# Patient Record
Sex: FEMALE | Race: OTHER | NOT HISPANIC OR LATINO | Employment: PART TIME | URBAN - METROPOLITAN AREA
[De-identification: names, ages, dates, MRNs, and addresses within clinical notes are randomized per-mention and may not be internally consistent; named-entity substitution may affect disease eponyms.]

---

## 2017-01-27 ENCOUNTER — HOSPITAL ENCOUNTER (EMERGENCY)
Facility: HOSPITAL | Age: 22
Discharge: HOME | End: 2017-01-27
Attending: EMERGENCY MEDICINE | Admitting: EMERGENCY MEDICINE

## 2017-01-27 VITALS
RESPIRATION RATE: 16 BRPM | HEART RATE: 88 BPM | SYSTOLIC BLOOD PRESSURE: 116 MMHG | BODY MASS INDEX: 22.68 KG/M2 | DIASTOLIC BLOOD PRESSURE: 56 MMHG | WEIGHT: 124 LBS | OXYGEN SATURATION: 99 % | TEMPERATURE: 97.6 F

## 2017-01-27 DIAGNOSIS — B34.9 VIRAL SYNDROME: Primary | ICD-10-CM

## 2017-01-27 PROCEDURE — 96372 THER/PROPH/DIAG INJ SC/IM: CPT

## 2017-01-27 PROCEDURE — 99283 EMERGENCY DEPT VISIT LOW MDM: CPT

## 2017-01-27 RX ORDER — KETOROLAC TROMETHAMINE 30 MG/ML
15 INJECTION, SOLUTION INTRAMUSCULAR; INTRAVENOUS ONCE
Status: COMPLETED | OUTPATIENT
Start: 2017-01-27 | End: 2017-01-27

## 2017-01-27 RX ORDER — OXYMETAZOLINE HYDROCHLORIDE 0.05 G/100ML
2 SPRAY NASAL EVERY 12 HOURS PRN
Qty: 12 ML | Refills: 0 | Status: SHIPPED | OUTPATIENT
Start: 2017-01-27 | End: 2017-09-06

## 2017-01-27 RX ADMIN — KETOROLAC TROMETHAMINE 15 MG: 30 INJECTION, SOLUTION INTRAMUSCULAR at 16:20

## 2017-09-06 ENCOUNTER — HOSPITAL ENCOUNTER (EMERGENCY)
Facility: HOSPITAL | Age: 22
End: 2017-09-06
Attending: EMERGENCY MEDICINE
Payer: COMMERCIAL

## 2017-09-06 ENCOUNTER — HOSPITAL ENCOUNTER (INPATIENT)
Facility: HOSPITAL | Age: 22
LOS: 8 days | Discharge: HOME | DRG: 885 | End: 2017-09-14
Attending: PSYCHIATRY & NEUROLOGY | Admitting: PSYCHIATRY & NEUROLOGY
Payer: COMMERCIAL

## 2017-09-06 VITALS
BODY MASS INDEX: 21.95 KG/M2 | DIASTOLIC BLOOD PRESSURE: 70 MMHG | TEMPERATURE: 98.6 F | SYSTOLIC BLOOD PRESSURE: 110 MMHG | WEIGHT: 120 LBS | RESPIRATION RATE: 16 BRPM | OXYGEN SATURATION: 100 % | HEART RATE: 62 BPM

## 2017-09-06 DIAGNOSIS — F33.3 SEVERE EPISODE OF RECURRENT MAJOR DEPRESSIVE DISORDER, WITH PSYCHOTIC FEATURES (HCC): Primary | ICD-10-CM

## 2017-09-06 DIAGNOSIS — T50.902A INTENTIONAL DRUG OVERDOSE (HCC): Primary | ICD-10-CM

## 2017-09-06 DIAGNOSIS — G47.00 INSOMNIA: ICD-10-CM

## 2017-09-06 DIAGNOSIS — F41.1 GENERALIZED ANXIETY DISORDER: ICD-10-CM

## 2017-09-06 DIAGNOSIS — F32.A DEPRESSION: ICD-10-CM

## 2017-09-06 LAB
ALBUMIN SERPL BCP-MCNC: 4.2 G/DL (ref 3.5–5)
ALP SERPL-CCNC: 76 U/L (ref 46–116)
ALT SERPL W P-5'-P-CCNC: 24 U/L (ref 12–78)
AMPHETAMINES SERPL QL SCN: NEGATIVE
ANION GAP SERPL CALCULATED.3IONS-SCNC: 9 MMOL/L (ref 4–13)
APAP SERPL-MCNC: 28 UG/ML (ref 10–30)
APAP SERPL-MCNC: 31 UG/ML (ref 10–30)
APAP SERPL-MCNC: 33 UG/ML (ref 10–30)
AST SERPL W P-5'-P-CCNC: 27 U/L (ref 5–45)
ATRIAL RATE: 93 BPM
BACTERIA UR QL AUTO: ABNORMAL /HPF
BARBITURATES UR QL: NEGATIVE
BASOPHILS # BLD AUTO: 0.03 THOUSANDS/ΜL (ref 0–0.1)
BASOPHILS NFR BLD AUTO: 0 % (ref 0–1)
BENZODIAZ UR QL: NEGATIVE
BILIRUB SERPL-MCNC: 0.4 MG/DL (ref 0.2–1)
BILIRUB UR QL STRIP: NEGATIVE
BUN SERPL-MCNC: 7 MG/DL (ref 5–25)
CALCIUM SERPL-MCNC: 8.9 MG/DL (ref 8.3–10.1)
CHLORIDE SERPL-SCNC: 105 MMOL/L (ref 100–108)
CLARITY UR: CLEAR
CO2 SERPL-SCNC: 27 MMOL/L (ref 21–32)
COCAINE UR QL: NEGATIVE
COLOR UR: YELLOW
COLOR, POC: YELLOW
CREAT SERPL-MCNC: 0.9 MG/DL (ref 0.6–1.3)
EOSINOPHIL # BLD AUTO: 0.13 THOUSAND/ΜL (ref 0–0.61)
EOSINOPHIL NFR BLD AUTO: 2 % (ref 0–6)
ERYTHROCYTE [DISTWIDTH] IN BLOOD BY AUTOMATED COUNT: 13.7 % (ref 11.6–15.1)
ETHANOL SERPL-MCNC: 112 MG/DL (ref 0–3)
GFR SERPL CREATININE-BSD FRML MDRD: 91 ML/MIN/1.73SQ M
GLUCOSE SERPL-MCNC: 104 MG/DL (ref 65–140)
GLUCOSE UR STRIP-MCNC: NEGATIVE MG/DL
HCG UR QL: NEGATIVE
HCT VFR BLD AUTO: 43 % (ref 34.8–46.1)
HGB BLD-MCNC: 14.5 G/DL (ref 11.5–15.4)
HGB UR QL STRIP.AUTO: NEGATIVE
HYALINE CASTS #/AREA URNS LPF: ABNORMAL /LPF
KETONES UR STRIP-MCNC: NEGATIVE MG/DL
LEUKOCYTE ESTERASE UR QL STRIP: NEGATIVE
LYMPHOCYTES # BLD AUTO: 2.85 THOUSANDS/ΜL (ref 0.6–4.47)
LYMPHOCYTES NFR BLD AUTO: 34 % (ref 14–44)
MCH RBC QN AUTO: 31.5 PG (ref 26.8–34.3)
MCHC RBC AUTO-ENTMCNC: 33.7 G/DL (ref 31.4–37.4)
MCV RBC AUTO: 94 FL (ref 82–98)
METHADONE UR QL: NEGATIVE
MONOCYTES # BLD AUTO: 1.03 THOUSAND/ΜL (ref 0.17–1.22)
MONOCYTES NFR BLD AUTO: 12 % (ref 4–12)
NEUTROPHILS # BLD AUTO: 4.44 THOUSANDS/ΜL (ref 1.85–7.62)
NEUTS SEG NFR BLD AUTO: 52 % (ref 43–75)
NITRITE UR QL STRIP: NEGATIVE
NON-SQ EPI CELLS URNS QL MICRO: ABNORMAL /HPF
NRBC BLD AUTO-RTO: 0 /100 WBCS
OPIATES UR QL SCN: NEGATIVE
P AXIS: 70 DEGREES
PCP UR QL: NEGATIVE
PH UR STRIP.AUTO: 6.5 [PH] (ref 4.5–8)
PLATELET # BLD AUTO: 283 THOUSANDS/UL (ref 149–390)
PMV BLD AUTO: 10.3 FL (ref 8.9–12.7)
POTASSIUM SERPL-SCNC: 3.7 MMOL/L (ref 3.5–5.3)
PR INTERVAL: 128 MS
PROT SERPL-MCNC: 8.1 G/DL (ref 6.4–8.2)
PROT UR STRIP-MCNC: ABNORMAL MG/DL
QRS AXIS: 86 DEGREES
QRSD INTERVAL: 76 MS
QT INTERVAL: 318 MS
QTC INTERVAL: 395 MS
RBC # BLD AUTO: 4.6 MILLION/UL (ref 3.81–5.12)
RBC #/AREA URNS AUTO: ABNORMAL /HPF
SALICYLATES SERPL-MCNC: 4 MG/DL (ref 3–20)
SODIUM SERPL-SCNC: 141 MMOL/L (ref 136–145)
SP GR UR STRIP.AUTO: 1.02 (ref 1–1.03)
T WAVE AXIS: 61 DEGREES
THC UR QL: POSITIVE
UROBILINOGEN UR QL STRIP.AUTO: 0.2 E.U./DL
VENTRICULAR RATE: 93 BPM
WBC # BLD AUTO: 8.5 THOUSAND/UL (ref 4.31–10.16)
WBC #/AREA URNS AUTO: ABNORMAL /HPF

## 2017-09-06 PROCEDURE — 96374 THER/PROPH/DIAG INJ IV PUSH: CPT

## 2017-09-06 PROCEDURE — 81001 URINALYSIS AUTO W/SCOPE: CPT

## 2017-09-06 PROCEDURE — 81002 URINALYSIS NONAUTO W/O SCOPE: CPT | Performed by: EMERGENCY MEDICINE

## 2017-09-06 PROCEDURE — 80053 COMPREHEN METABOLIC PANEL: CPT | Performed by: EMERGENCY MEDICINE

## 2017-09-06 PROCEDURE — 80307 DRUG TEST PRSMV CHEM ANLYZR: CPT | Performed by: EMERGENCY MEDICINE

## 2017-09-06 PROCEDURE — 36415 COLL VENOUS BLD VENIPUNCTURE: CPT | Performed by: EMERGENCY MEDICINE

## 2017-09-06 PROCEDURE — 96361 HYDRATE IV INFUSION ADD-ON: CPT

## 2017-09-06 PROCEDURE — 81025 URINE PREGNANCY TEST: CPT | Performed by: EMERGENCY MEDICINE

## 2017-09-06 PROCEDURE — 80320 DRUG SCREEN QUANTALCOHOLS: CPT | Performed by: EMERGENCY MEDICINE

## 2017-09-06 PROCEDURE — 93005 ELECTROCARDIOGRAM TRACING: CPT

## 2017-09-06 PROCEDURE — 80329 ANALGESICS NON-OPIOID 1 OR 2: CPT | Performed by: EMERGENCY MEDICINE

## 2017-09-06 PROCEDURE — 85025 COMPLETE CBC W/AUTO DIFF WBC: CPT | Performed by: EMERGENCY MEDICINE

## 2017-09-06 PROCEDURE — 99285 EMERGENCY DEPT VISIT HI MDM: CPT

## 2017-09-06 RX ORDER — BENZTROPINE MESYLATE 1 MG/ML
1 INJECTION INTRAMUSCULAR; INTRAVENOUS EVERY 6 HOURS PRN
Status: CANCELLED | OUTPATIENT
Start: 2017-09-06

## 2017-09-06 RX ORDER — LORAZEPAM 0.5 MG/1
1 TABLET ORAL ONCE
Status: COMPLETED | OUTPATIENT
Start: 2017-09-06 | End: 2017-09-06

## 2017-09-06 RX ORDER — OLANZAPINE 10 MG/1
10 TABLET ORAL
Status: CANCELLED | OUTPATIENT
Start: 2017-09-06

## 2017-09-06 RX ORDER — MAGNESIUM HYDROXIDE/ALUMINUM HYDROXICE/SIMETHICONE 120; 1200; 1200 MG/30ML; MG/30ML; MG/30ML
30 SUSPENSION ORAL EVERY 4 HOURS PRN
Status: DISCONTINUED | OUTPATIENT
Start: 2017-09-06 | End: 2017-09-14 | Stop reason: HOSPADM

## 2017-09-06 RX ORDER — BENZTROPINE MESYLATE 1 MG/1
1 TABLET ORAL EVERY 6 HOURS PRN
Status: CANCELLED | OUTPATIENT
Start: 2017-09-06

## 2017-09-06 RX ORDER — HALOPERIDOL 5 MG
5 TABLET ORAL EVERY 6 HOURS PRN
Status: DISCONTINUED | OUTPATIENT
Start: 2017-09-06 | End: 2017-09-14 | Stop reason: HOSPADM

## 2017-09-06 RX ORDER — TRAZODONE HYDROCHLORIDE 50 MG/1
50 TABLET ORAL
Status: DISCONTINUED | OUTPATIENT
Start: 2017-09-06 | End: 2017-09-14 | Stop reason: HOSPADM

## 2017-09-06 RX ORDER — ACETAMINOPHEN 325 MG/1
650 TABLET ORAL EVERY 6 HOURS PRN
Status: DISCONTINUED | OUTPATIENT
Start: 2017-09-06 | End: 2017-09-14 | Stop reason: HOSPADM

## 2017-09-06 RX ORDER — BENZTROPINE MESYLATE 1 MG/ML
1 INJECTION INTRAMUSCULAR; INTRAVENOUS EVERY 6 HOURS PRN
Status: DISCONTINUED | OUTPATIENT
Start: 2017-09-06 | End: 2017-09-14 | Stop reason: HOSPADM

## 2017-09-06 RX ORDER — RISPERIDONE 1 MG/1
1 TABLET, ORALLY DISINTEGRATING ORAL
Status: DISCONTINUED | OUTPATIENT
Start: 2017-09-06 | End: 2017-09-14 | Stop reason: HOSPADM

## 2017-09-06 RX ORDER — LORAZEPAM 2 MG/ML
2 INJECTION INTRAMUSCULAR EVERY 6 HOURS PRN
Status: DISCONTINUED | OUTPATIENT
Start: 2017-09-06 | End: 2017-09-14 | Stop reason: HOSPADM

## 2017-09-06 RX ORDER — LORAZEPAM 1 MG/1
1 TABLET ORAL EVERY 6 HOURS PRN
Status: DISCONTINUED | OUTPATIENT
Start: 2017-09-06 | End: 2017-09-11

## 2017-09-06 RX ORDER — OLANZAPINE 10 MG/1
10 INJECTION, POWDER, LYOPHILIZED, FOR SOLUTION INTRAMUSCULAR
Status: DISCONTINUED | OUTPATIENT
Start: 2017-09-06 | End: 2017-09-14 | Stop reason: HOSPADM

## 2017-09-06 RX ORDER — HALOPERIDOL 5 MG
5 TABLET ORAL EVERY 6 HOURS PRN
Status: CANCELLED | OUTPATIENT
Start: 2017-09-06

## 2017-09-06 RX ORDER — OLANZAPINE 10 MG/1
10 INJECTION, POWDER, LYOPHILIZED, FOR SOLUTION INTRAMUSCULAR
Status: CANCELLED | OUTPATIENT
Start: 2017-09-06

## 2017-09-06 RX ORDER — OLANZAPINE 10 MG/1
10 TABLET ORAL
Status: DISCONTINUED | OUTPATIENT
Start: 2017-09-06 | End: 2017-09-14 | Stop reason: HOSPADM

## 2017-09-06 RX ORDER — ONDANSETRON 2 MG/ML
4 INJECTION INTRAMUSCULAR; INTRAVENOUS ONCE
Status: COMPLETED | OUTPATIENT
Start: 2017-09-06 | End: 2017-09-06

## 2017-09-06 RX ORDER — TRAZODONE HYDROCHLORIDE 50 MG/1
50 TABLET ORAL
Status: CANCELLED | OUTPATIENT
Start: 2017-09-06

## 2017-09-06 RX ORDER — BENZTROPINE MESYLATE 1 MG/1
1 TABLET ORAL EVERY 6 HOURS PRN
Status: DISCONTINUED | OUTPATIENT
Start: 2017-09-06 | End: 2017-09-14 | Stop reason: HOSPADM

## 2017-09-06 RX ORDER — LORAZEPAM 2 MG/ML
2 INJECTION INTRAMUSCULAR EVERY 6 HOURS PRN
Status: CANCELLED | OUTPATIENT
Start: 2017-09-06

## 2017-09-06 RX ORDER — MAGNESIUM HYDROXIDE/ALUMINUM HYDROXICE/SIMETHICONE 120; 1200; 1200 MG/30ML; MG/30ML; MG/30ML
30 SUSPENSION ORAL EVERY 4 HOURS PRN
Status: CANCELLED | OUTPATIENT
Start: 2017-09-06

## 2017-09-06 RX ORDER — LORAZEPAM 0.5 MG/1
1 TABLET ORAL EVERY 6 HOURS PRN
Status: CANCELLED | OUTPATIENT
Start: 2017-09-06

## 2017-09-06 RX ORDER — HALOPERIDOL 5 MG/ML
5 INJECTION INTRAMUSCULAR EVERY 6 HOURS PRN
Status: CANCELLED | OUTPATIENT
Start: 2017-09-06

## 2017-09-06 RX ORDER — HALOPERIDOL 5 MG/ML
5 INJECTION INTRAMUSCULAR EVERY 6 HOURS PRN
Status: DISCONTINUED | OUTPATIENT
Start: 2017-09-06 | End: 2017-09-14 | Stop reason: HOSPADM

## 2017-09-06 RX ORDER — RISPERIDONE 1 MG/1
1 TABLET, ORALLY DISINTEGRATING ORAL
Status: CANCELLED | OUTPATIENT
Start: 2017-09-06

## 2017-09-06 RX ORDER — ACETAMINOPHEN 325 MG/1
650 TABLET ORAL EVERY 6 HOURS PRN
Status: CANCELLED | OUTPATIENT
Start: 2017-09-06

## 2017-09-06 RX ADMIN — LORAZEPAM 1 MG: 0.5 TABLET ORAL at 12:56

## 2017-09-06 RX ADMIN — SODIUM CHLORIDE 1000 ML: 0.9 INJECTION, SOLUTION INTRAVENOUS at 06:00

## 2017-09-06 RX ADMIN — ONDANSETRON 4 MG: 2 INJECTION INTRAMUSCULAR; INTRAVENOUS at 06:02

## 2017-09-07 PROBLEM — IMO0002 ALCOHOL USE DISORDER: Status: ACTIVE | Noted: 2017-09-07

## 2017-09-07 PROBLEM — F41.1 GENERALIZED ANXIETY DISORDER: Status: ACTIVE | Noted: 2017-09-07

## 2017-09-07 PROBLEM — F33.2 SEVERE EPISODE OF RECURRENT MAJOR DEPRESSIVE DISORDER, WITHOUT PSYCHOTIC FEATURES (HCC): Status: ACTIVE | Noted: 2017-09-07

## 2017-09-07 LAB
APAP SERPL-MCNC: <2 UG/ML (ref 10–30)
BILIRUB UR QL STRIP: NEGATIVE
CHOLEST SERPL-MCNC: 110 MG/DL (ref 50–200)
CLARITY UR: CLEAR
COLOR UR: YELLOW
EST. AVERAGE GLUCOSE BLD GHB EST-MCNC: 108 MG/DL
GLUCOSE UR STRIP-MCNC: NEGATIVE MG/DL
HBA1C MFR BLD: 5.4 % (ref 4.2–6.3)
HCG SERPL QL: NEGATIVE
HDLC SERPL-MCNC: 67 MG/DL (ref 40–60)
HGB UR QL STRIP.AUTO: NEGATIVE
KETONES UR STRIP-MCNC: ABNORMAL MG/DL
LDLC SERPL CALC-MCNC: 32 MG/DL (ref 0–100)
LEUKOCYTE ESTERASE UR QL STRIP: NEGATIVE
NITRITE UR QL STRIP: NEGATIVE
PH UR STRIP.AUTO: 6 [PH] (ref 4.5–8)
PROT UR STRIP-MCNC: NEGATIVE MG/DL
RPR SER QL: NORMAL
SP GR UR STRIP.AUTO: 1.01 (ref 1–1.03)
TRIGL SERPL-MCNC: 53 MG/DL
TSH SERPL DL<=0.05 MIU/L-ACNC: 0.66 UIU/ML (ref 0.36–3.74)
UROBILINOGEN UR QL STRIP.AUTO: 0.2 E.U./DL

## 2017-09-07 PROCEDURE — 84443 ASSAY THYROID STIM HORMONE: CPT | Performed by: PHYSICIAN ASSISTANT

## 2017-09-07 PROCEDURE — 80329 ANALGESICS NON-OPIOID 1 OR 2: CPT | Performed by: PHYSICIAN ASSISTANT

## 2017-09-07 PROCEDURE — 83036 HEMOGLOBIN GLYCOSYLATED A1C: CPT | Performed by: PHYSICIAN ASSISTANT

## 2017-09-07 PROCEDURE — 84703 CHORIONIC GONADOTROPIN ASSAY: CPT | Performed by: PHYSICIAN ASSISTANT

## 2017-09-07 PROCEDURE — 81003 URINALYSIS AUTO W/O SCOPE: CPT | Performed by: PHYSICIAN ASSISTANT

## 2017-09-07 PROCEDURE — 80061 LIPID PANEL: CPT | Performed by: PHYSICIAN ASSISTANT

## 2017-09-07 PROCEDURE — 86592 SYPHILIS TEST NON-TREP QUAL: CPT | Performed by: PHYSICIAN ASSISTANT

## 2017-09-07 RX ORDER — PAROXETINE 10 MG/1
10 TABLET, FILM COATED ORAL DAILY
Status: DISCONTINUED | OUTPATIENT
Start: 2017-09-07 | End: 2017-09-08

## 2017-09-07 RX ORDER — FOLIC ACID 1 MG/1
1 TABLET ORAL DAILY
Status: DISCONTINUED | OUTPATIENT
Start: 2017-09-07 | End: 2017-09-14 | Stop reason: HOSPADM

## 2017-09-07 RX ORDER — THIAMINE MONONITRATE (VIT B1) 100 MG
100 TABLET ORAL DAILY
Status: DISCONTINUED | OUTPATIENT
Start: 2017-09-07 | End: 2017-09-14 | Stop reason: HOSPADM

## 2017-09-07 RX ADMIN — TRAZODONE HYDROCHLORIDE 50 MG: 50 TABLET ORAL at 21:36

## 2017-09-07 RX ADMIN — PAROXETINE HYDROCHLORIDE 10 MG: 10 TABLET, FILM COATED ORAL at 10:27

## 2017-09-07 RX ADMIN — LORAZEPAM 1 MG: 1 TABLET ORAL at 16:57

## 2017-09-07 RX ADMIN — Medication 100 MG: at 13:14

## 2017-09-07 RX ADMIN — FOLIC ACID 1 MG: 1 TABLET ORAL at 13:14

## 2017-09-07 RX ADMIN — TRAZODONE HYDROCHLORIDE 50 MG: 50 TABLET ORAL at 00:33

## 2017-09-08 RX ORDER — PAROXETINE HYDROCHLORIDE 20 MG/1
20 TABLET, FILM COATED ORAL DAILY
Status: DISCONTINUED | OUTPATIENT
Start: 2017-09-09 | End: 2017-09-09

## 2017-09-08 RX ORDER — PAROXETINE 10 MG/1
10 TABLET, FILM COATED ORAL ONCE
Status: COMPLETED | OUTPATIENT
Start: 2017-09-08 | End: 2017-09-08

## 2017-09-08 RX ORDER — NICOTINE 21 MG/24HR
1 PATCH, TRANSDERMAL 24 HOURS TRANSDERMAL DAILY
Status: DISCONTINUED | OUTPATIENT
Start: 2017-09-08 | End: 2017-09-14 | Stop reason: HOSPADM

## 2017-09-08 RX ORDER — HYDROXYZINE HYDROCHLORIDE 25 MG/1
25 TABLET, FILM COATED ORAL EVERY 6 HOURS PRN
Status: DISCONTINUED | OUTPATIENT
Start: 2017-09-08 | End: 2017-09-10

## 2017-09-08 RX ADMIN — NICOTINE POLACRILEX 2 MG: 2 GUM, CHEWING BUCCAL at 16:44

## 2017-09-08 RX ADMIN — LORAZEPAM 1 MG: 1 TABLET ORAL at 16:44

## 2017-09-08 RX ADMIN — PAROXETINE HYDROCHLORIDE 10 MG: 10 TABLET, FILM COATED ORAL at 09:04

## 2017-09-08 RX ADMIN — PAROXETINE HYDROCHLORIDE 10 MG: 10 TABLET, FILM COATED ORAL at 10:39

## 2017-09-08 RX ADMIN — TRAZODONE HYDROCHLORIDE 50 MG: 50 TABLET ORAL at 21:15

## 2017-09-08 RX ADMIN — FOLIC ACID 1 MG: 1 TABLET ORAL at 09:04

## 2017-09-08 RX ADMIN — Medication 100 MG: at 09:04

## 2017-09-08 RX ADMIN — HYDROXYZINE HYDROCHLORIDE 25 MG: 25 TABLET, FILM COATED ORAL at 13:10

## 2017-09-08 RX ADMIN — HYDROXYZINE HYDROCHLORIDE 25 MG: 25 TABLET, FILM COATED ORAL at 21:15

## 2017-09-08 RX ADMIN — NICOTINE 1 PATCH: 14 PATCH TRANSDERMAL at 12:26

## 2017-09-09 RX ORDER — GINSENG 100 MG
1 CAPSULE ORAL 2 TIMES DAILY
Status: DISCONTINUED | OUTPATIENT
Start: 2017-09-09 | End: 2017-09-14 | Stop reason: HOSPADM

## 2017-09-09 RX ORDER — GABAPENTIN 100 MG/1
100 CAPSULE ORAL 3 TIMES DAILY
Status: DISCONTINUED | OUTPATIENT
Start: 2017-09-09 | End: 2017-09-11

## 2017-09-09 RX ORDER — PAROXETINE 10 MG/1
10 TABLET, FILM COATED ORAL DAILY
Status: DISCONTINUED | OUTPATIENT
Start: 2017-09-10 | End: 2017-09-09

## 2017-09-09 RX ORDER — PAROXETINE HYDROCHLORIDE 20 MG/1
20 TABLET, FILM COATED ORAL DAILY
Status: DISCONTINUED | OUTPATIENT
Start: 2017-09-10 | End: 2017-09-11

## 2017-09-09 RX ADMIN — TRAZODONE HYDROCHLORIDE 50 MG: 50 TABLET ORAL at 21:38

## 2017-09-09 RX ADMIN — HYDROXYZINE HYDROCHLORIDE 25 MG: 25 TABLET, FILM COATED ORAL at 21:40

## 2017-09-09 RX ADMIN — HYDROXYZINE HYDROCHLORIDE 25 MG: 25 TABLET, FILM COATED ORAL at 14:10

## 2017-09-09 RX ADMIN — BACITRACIN ZINC 1 SMALL APPLICATION: 500 OINTMENT TOPICAL at 21:37

## 2017-09-09 RX ADMIN — GABAPENTIN 100 MG: 100 CAPSULE ORAL at 21:37

## 2017-09-09 RX ADMIN — LORAZEPAM 1 MG: 1 TABLET ORAL at 18:46

## 2017-09-09 RX ADMIN — FOLIC ACID 1 MG: 1 TABLET ORAL at 08:38

## 2017-09-09 RX ADMIN — PAROXETINE HYDROCHLORIDE 20 MG: 20 TABLET, FILM COATED ORAL at 08:39

## 2017-09-09 RX ADMIN — LORAZEPAM 1 MG: 1 TABLET ORAL at 08:40

## 2017-09-09 RX ADMIN — Medication 100 MG: at 08:38

## 2017-09-09 RX ADMIN — GABAPENTIN 100 MG: 100 CAPSULE ORAL at 14:10

## 2017-09-10 RX ORDER — HYDROXYZINE 50 MG/1
50 TABLET, FILM COATED ORAL EVERY 6 HOURS PRN
Status: DISCONTINUED | OUTPATIENT
Start: 2017-09-10 | End: 2017-09-14 | Stop reason: HOSPADM

## 2017-09-10 RX ADMIN — TRAZODONE HYDROCHLORIDE 50 MG: 50 TABLET ORAL at 21:52

## 2017-09-10 RX ADMIN — Medication 100 MG: at 08:48

## 2017-09-10 RX ADMIN — PAROXETINE HYDROCHLORIDE 20 MG: 20 TABLET, FILM COATED ORAL at 08:48

## 2017-09-10 RX ADMIN — LORAZEPAM 1 MG: 1 TABLET ORAL at 08:51

## 2017-09-10 RX ADMIN — LORAZEPAM 1 MG: 1 TABLET ORAL at 17:02

## 2017-09-10 RX ADMIN — GABAPENTIN 100 MG: 100 CAPSULE ORAL at 15:49

## 2017-09-10 RX ADMIN — GABAPENTIN 100 MG: 100 CAPSULE ORAL at 08:48

## 2017-09-10 RX ADMIN — NICOTINE POLACRILEX 2 MG: 2 GUM, CHEWING BUCCAL at 12:03

## 2017-09-10 RX ADMIN — GABAPENTIN 100 MG: 100 CAPSULE ORAL at 21:50

## 2017-09-10 RX ADMIN — HYDROXYZINE HYDROCHLORIDE 50 MG: 50 TABLET, FILM COATED ORAL at 12:02

## 2017-09-10 RX ADMIN — HYDROXYZINE HYDROCHLORIDE 50 MG: 50 TABLET, FILM COATED ORAL at 21:51

## 2017-09-10 RX ADMIN — BACITRACIN ZINC 1 SMALL APPLICATION: 500 OINTMENT TOPICAL at 17:54

## 2017-09-10 RX ADMIN — FOLIC ACID 1 MG: 1 TABLET ORAL at 08:48

## 2017-09-11 PROBLEM — F33.3 SEVERE EPISODE OF RECURRENT MAJOR DEPRESSIVE DISORDER, WITH PSYCHOTIC FEATURES (HCC): Status: ACTIVE | Noted: 2017-09-07

## 2017-09-11 RX ORDER — PAROXETINE 10 MG/1
10 TABLET, FILM COATED ORAL ONCE
Status: COMPLETED | OUTPATIENT
Start: 2017-09-11 | End: 2017-09-11

## 2017-09-11 RX ORDER — RISPERIDONE 0.5 MG/1
0.5 TABLET, ORALLY DISINTEGRATING ORAL 2 TIMES DAILY
Status: DISCONTINUED | OUTPATIENT
Start: 2017-09-11 | End: 2017-09-12

## 2017-09-11 RX ORDER — GABAPENTIN 300 MG/1
300 CAPSULE ORAL 3 TIMES DAILY
Status: DISCONTINUED | OUTPATIENT
Start: 2017-09-11 | End: 2017-09-14 | Stop reason: HOSPADM

## 2017-09-11 RX ADMIN — GABAPENTIN 300 MG: 300 CAPSULE ORAL at 16:52

## 2017-09-11 RX ADMIN — TRAZODONE HYDROCHLORIDE 50 MG: 50 TABLET ORAL at 21:24

## 2017-09-11 RX ADMIN — HYDROXYZINE HYDROCHLORIDE 50 MG: 50 TABLET, FILM COATED ORAL at 09:03

## 2017-09-11 RX ADMIN — GABAPENTIN 100 MG: 100 CAPSULE ORAL at 09:02

## 2017-09-11 RX ADMIN — Medication 100 MG: at 09:02

## 2017-09-11 RX ADMIN — PAROXETINE HYDROCHLORIDE 10 MG: 10 TABLET, FILM COATED ORAL at 12:18

## 2017-09-11 RX ADMIN — BACITRACIN ZINC 1 SMALL APPLICATION: 500 OINTMENT TOPICAL at 18:03

## 2017-09-11 RX ADMIN — RISPERIDONE 0.5 MG: 0.5 TABLET, ORALLY DISINTEGRATING ORAL at 17:36

## 2017-09-11 RX ADMIN — RISPERIDONE 0.5 MG: 0.5 TABLET, ORALLY DISINTEGRATING ORAL at 12:18

## 2017-09-11 RX ADMIN — HYDROXYZINE HYDROCHLORIDE 50 MG: 50 TABLET, FILM COATED ORAL at 16:54

## 2017-09-11 RX ADMIN — NICOTINE POLACRILEX 2 MG: 2 GUM, CHEWING BUCCAL at 09:05

## 2017-09-11 RX ADMIN — FOLIC ACID 1 MG: 1 TABLET ORAL at 09:02

## 2017-09-11 RX ADMIN — GABAPENTIN 300 MG: 300 CAPSULE ORAL at 21:23

## 2017-09-11 RX ADMIN — PAROXETINE HYDROCHLORIDE 20 MG: 20 TABLET, FILM COATED ORAL at 09:02

## 2017-09-11 RX ADMIN — BACITRACIN ZINC 1 SMALL APPLICATION: 500 OINTMENT TOPICAL at 09:06

## 2017-09-12 ENCOUNTER — GENERIC CONVERSION - ENCOUNTER (OUTPATIENT)
Dept: OTHER | Facility: OTHER | Age: 22
End: 2017-09-12

## 2017-09-12 RX ORDER — RISPERIDONE 0.5 MG/1
0.5 TABLET, ORALLY DISINTEGRATING ORAL ONCE
Status: COMPLETED | OUTPATIENT
Start: 2017-09-12 | End: 2017-09-12

## 2017-09-12 RX ORDER — RISPERIDONE 1 MG/1
1 TABLET, ORALLY DISINTEGRATING ORAL 2 TIMES DAILY
Status: DISCONTINUED | OUTPATIENT
Start: 2017-09-12 | End: 2017-09-14 | Stop reason: HOSPADM

## 2017-09-12 RX ADMIN — BACITRACIN ZINC 1 SMALL APPLICATION: 500 OINTMENT TOPICAL at 17:17

## 2017-09-12 RX ADMIN — RISPERIDONE 0.5 MG: 0.5 TABLET, ORALLY DISINTEGRATING ORAL at 11:07

## 2017-09-12 RX ADMIN — RISPERIDONE 1 MG: 1 TABLET, ORALLY DISINTEGRATING ORAL at 17:17

## 2017-09-12 RX ADMIN — RISPERIDONE 0.5 MG: 0.5 TABLET, ORALLY DISINTEGRATING ORAL at 09:02

## 2017-09-12 RX ADMIN — NICOTINE POLACRILEX 2 MG: 2 GUM, CHEWING BUCCAL at 17:16

## 2017-09-12 RX ADMIN — GABAPENTIN 300 MG: 300 CAPSULE ORAL at 16:07

## 2017-09-12 RX ADMIN — Medication 100 MG: at 09:01

## 2017-09-12 RX ADMIN — TRAZODONE HYDROCHLORIDE 50 MG: 50 TABLET ORAL at 21:31

## 2017-09-12 RX ADMIN — HYDROXYZINE HYDROCHLORIDE 50 MG: 50 TABLET, FILM COATED ORAL at 11:07

## 2017-09-12 RX ADMIN — HYDROXYZINE HYDROCHLORIDE 50 MG: 50 TABLET, FILM COATED ORAL at 17:18

## 2017-09-12 RX ADMIN — PAROXETINE HYDROCHLORIDE 30 MG: 20 TABLET, FILM COATED ORAL at 09:01

## 2017-09-12 RX ADMIN — GABAPENTIN 300 MG: 300 CAPSULE ORAL at 09:01

## 2017-09-12 RX ADMIN — GABAPENTIN 300 MG: 300 CAPSULE ORAL at 21:30

## 2017-09-12 RX ADMIN — FOLIC ACID 1 MG: 1 TABLET ORAL at 09:01

## 2017-09-13 RX ADMIN — BACITRACIN ZINC 1 SMALL APPLICATION: 500 OINTMENT TOPICAL at 17:24

## 2017-09-13 RX ADMIN — TRAZODONE HYDROCHLORIDE 50 MG: 50 TABLET ORAL at 21:17

## 2017-09-13 RX ADMIN — BACITRACIN ZINC 1 SMALL APPLICATION: 500 OINTMENT TOPICAL at 08:13

## 2017-09-13 RX ADMIN — GABAPENTIN 300 MG: 300 CAPSULE ORAL at 16:14

## 2017-09-13 RX ADMIN — Medication 100 MG: at 08:14

## 2017-09-13 RX ADMIN — GABAPENTIN 300 MG: 300 CAPSULE ORAL at 21:16

## 2017-09-13 RX ADMIN — HYDROXYZINE HYDROCHLORIDE 50 MG: 50 TABLET, FILM COATED ORAL at 12:22

## 2017-09-13 RX ADMIN — FOLIC ACID 1 MG: 1 TABLET ORAL at 08:14

## 2017-09-13 RX ADMIN — GABAPENTIN 300 MG: 300 CAPSULE ORAL at 08:14

## 2017-09-13 RX ADMIN — PAROXETINE HYDROCHLORIDE 30 MG: 20 TABLET, FILM COATED ORAL at 08:14

## 2017-09-13 RX ADMIN — NICOTINE POLACRILEX 2 MG: 2 GUM, CHEWING BUCCAL at 08:14

## 2017-09-13 RX ADMIN — RISPERIDONE 1 MG: 1 TABLET, ORALLY DISINTEGRATING ORAL at 17:25

## 2017-09-13 RX ADMIN — NICOTINE POLACRILEX 2 MG: 2 GUM, CHEWING BUCCAL at 16:44

## 2017-09-13 RX ADMIN — RISPERIDONE 1 MG: 1 TABLET, ORALLY DISINTEGRATING ORAL at 08:13

## 2017-09-14 VITALS
SYSTOLIC BLOOD PRESSURE: 100 MMHG | WEIGHT: 114.42 LBS | TEMPERATURE: 98.4 F | OXYGEN SATURATION: 98 % | HEART RATE: 93 BPM | RESPIRATION RATE: 18 BRPM | HEIGHT: 64 IN | DIASTOLIC BLOOD PRESSURE: 73 MMHG | BODY MASS INDEX: 19.53 KG/M2

## 2017-09-14 RX ORDER — TRAZODONE HYDROCHLORIDE 50 MG/1
50 TABLET ORAL
Qty: 30 TABLET | Refills: 1 | Status: SHIPPED | OUTPATIENT
Start: 2017-09-14 | End: 2020-01-27 | Stop reason: HOSPADM

## 2017-09-14 RX ORDER — RISPERIDONE 1 MG/1
1 TABLET, FILM COATED ORAL 2 TIMES DAILY
Qty: 60 TABLET | Refills: 0 | Status: SHIPPED | OUTPATIENT
Start: 2017-09-14 | End: 2017-10-08

## 2017-09-14 RX ORDER — HYDROXYZINE 50 MG/1
50 TABLET, FILM COATED ORAL EVERY 6 HOURS PRN
Qty: 30 TABLET | Refills: 0 | Status: SHIPPED | OUTPATIENT
Start: 2017-09-14 | End: 2017-11-26

## 2017-09-14 RX ORDER — GABAPENTIN 300 MG/1
300 CAPSULE ORAL 3 TIMES DAILY
Qty: 90 CAPSULE | Refills: 0 | Status: SHIPPED | OUTPATIENT
Start: 2017-09-14 | End: 2020-01-27 | Stop reason: HOSPADM

## 2017-09-14 RX ORDER — PAROXETINE 30 MG/1
30 TABLET, FILM COATED ORAL DAILY
Qty: 30 TABLET | Refills: 0 | Status: SHIPPED | OUTPATIENT
Start: 2017-09-14 | End: 2017-11-26

## 2017-09-14 RX ADMIN — NICOTINE POLACRILEX 2 MG: 2 GUM, CHEWING BUCCAL at 08:51

## 2017-09-14 RX ADMIN — RISPERIDONE 1 MG: 1 TABLET, ORALLY DISINTEGRATING ORAL at 08:09

## 2017-09-14 RX ADMIN — FOLIC ACID 1 MG: 1 TABLET ORAL at 08:09

## 2017-09-14 RX ADMIN — BACITRACIN ZINC 1 SMALL APPLICATION: 500 OINTMENT TOPICAL at 08:11

## 2017-09-14 RX ADMIN — PAROXETINE HYDROCHLORIDE 30 MG: 20 TABLET, FILM COATED ORAL at 08:09

## 2017-09-14 RX ADMIN — GABAPENTIN 300 MG: 300 CAPSULE ORAL at 08:09

## 2017-09-14 RX ADMIN — Medication 100 MG: at 08:09

## 2017-09-15 ENCOUNTER — ALLSCRIPTS OFFICE VISIT (OUTPATIENT)
Dept: PSYCHOLOGY | Facility: CLINIC | Age: 22
End: 2017-09-15
Payer: COMMERCIAL

## 2017-09-15 PROCEDURE — G0176 OPPS/PHP;ACTIVITY THERAPY: HCPCS | Performed by: PSYCHIATRY & NEUROLOGY

## 2017-09-15 PROCEDURE — 90791 PSYCH DIAGNOSTIC EVALUATION: CPT | Performed by: PSYCHIATRY & NEUROLOGY

## 2017-09-15 PROCEDURE — S0201 PARTIAL HOSPITALIZATION SERV: HCPCS | Performed by: PSYCHIATRY & NEUROLOGY

## 2017-09-15 PROCEDURE — G0410 GRP PSYCH PARTIAL HOSP 45-50: HCPCS | Performed by: PSYCHIATRY & NEUROLOGY

## 2017-09-15 PROCEDURE — G0177 OPPS/PHP; TRAIN & EDUC SERV: HCPCS | Performed by: PSYCHIATRY & NEUROLOGY

## 2017-09-18 ENCOUNTER — GENERIC CONVERSION - ENCOUNTER (OUTPATIENT)
Dept: OTHER | Facility: OTHER | Age: 22
End: 2017-09-18

## 2017-09-18 ENCOUNTER — APPOINTMENT (OUTPATIENT)
Dept: PSYCHOLOGY | Facility: CLINIC | Age: 22
End: 2017-09-18
Payer: COMMERCIAL

## 2017-09-18 PROCEDURE — G0177 OPPS/PHP; TRAIN & EDUC SERV: HCPCS | Performed by: PSYCHIATRY & NEUROLOGY

## 2017-09-18 PROCEDURE — G0176 OPPS/PHP;ACTIVITY THERAPY: HCPCS | Performed by: PSYCHIATRY & NEUROLOGY

## 2017-09-18 PROCEDURE — S0201 PARTIAL HOSPITALIZATION SERV: HCPCS | Performed by: PSYCHIATRY & NEUROLOGY

## 2017-09-18 PROCEDURE — G0410 GRP PSYCH PARTIAL HOSP 45-50: HCPCS | Performed by: PSYCHIATRY & NEUROLOGY

## 2017-09-19 ENCOUNTER — GENERIC CONVERSION - ENCOUNTER (OUTPATIENT)
Dept: OTHER | Facility: OTHER | Age: 22
End: 2017-09-19

## 2017-09-20 ENCOUNTER — APPOINTMENT (OUTPATIENT)
Dept: PSYCHOLOGY | Facility: CLINIC | Age: 22
End: 2017-09-20
Payer: COMMERCIAL

## 2017-09-20 ENCOUNTER — GENERIC CONVERSION - ENCOUNTER (OUTPATIENT)
Dept: OTHER | Facility: OTHER | Age: 22
End: 2017-09-20

## 2017-09-20 PROCEDURE — S0201 PARTIAL HOSPITALIZATION SERV: HCPCS | Performed by: PSYCHIATRY & NEUROLOGY

## 2017-09-20 PROCEDURE — G0177 OPPS/PHP; TRAIN & EDUC SERV: HCPCS | Performed by: PSYCHIATRY & NEUROLOGY

## 2017-09-20 PROCEDURE — G0410 GRP PSYCH PARTIAL HOSP 45-50: HCPCS | Performed by: PSYCHIATRY & NEUROLOGY

## 2017-09-20 PROCEDURE — G0176 OPPS/PHP;ACTIVITY THERAPY: HCPCS | Performed by: PSYCHIATRY & NEUROLOGY

## 2017-09-21 ENCOUNTER — GENERIC CONVERSION - ENCOUNTER (OUTPATIENT)
Dept: OTHER | Facility: OTHER | Age: 22
End: 2017-09-21

## 2017-09-22 ENCOUNTER — GENERIC CONVERSION - ENCOUNTER (OUTPATIENT)
Dept: OTHER | Facility: OTHER | Age: 22
End: 2017-09-22

## 2017-09-25 ENCOUNTER — GENERIC CONVERSION - ENCOUNTER (OUTPATIENT)
Dept: OTHER | Facility: OTHER | Age: 22
End: 2017-09-25

## 2017-10-08 ENCOUNTER — HOSPITAL ENCOUNTER (EMERGENCY)
Facility: HOSPITAL | Age: 22
End: 2017-10-09
Attending: EMERGENCY MEDICINE | Admitting: EMERGENCY MEDICINE
Payer: COMMERCIAL

## 2017-10-08 DIAGNOSIS — F32.A DEPRESSION: ICD-10-CM

## 2017-10-08 DIAGNOSIS — T50.902A SUICIDE ATTEMPT BY DRUG INGESTION, INITIAL ENCOUNTER (HCC): Primary | ICD-10-CM

## 2017-10-08 LAB
ALBUMIN SERPL BCP-MCNC: 3.8 G/DL (ref 3.5–5)
ALP SERPL-CCNC: 67 U/L (ref 46–116)
ALT SERPL W P-5'-P-CCNC: 47 U/L (ref 12–78)
ANION GAP SERPL CALCULATED.3IONS-SCNC: 6 MMOL/L (ref 4–13)
APAP SERPL-MCNC: <2 UG/ML (ref 10–30)
AST SERPL W P-5'-P-CCNC: 23 U/L (ref 5–45)
BASOPHILS # BLD AUTO: 0.04 THOUSANDS/ΜL (ref 0–0.1)
BASOPHILS NFR BLD AUTO: 1 % (ref 0–1)
BILIRUB SERPL-MCNC: 0.3 MG/DL (ref 0.2–1)
BUN SERPL-MCNC: 11 MG/DL (ref 5–25)
CALCIUM SERPL-MCNC: 8.5 MG/DL (ref 8.3–10.1)
CHLORIDE SERPL-SCNC: 104 MMOL/L (ref 100–108)
CO2 SERPL-SCNC: 28 MMOL/L (ref 21–32)
CREAT SERPL-MCNC: 0.86 MG/DL (ref 0.6–1.3)
EOSINOPHIL # BLD AUTO: 0.28 THOUSAND/ΜL (ref 0–0.61)
EOSINOPHIL NFR BLD AUTO: 4 % (ref 0–6)
ERYTHROCYTE [DISTWIDTH] IN BLOOD BY AUTOMATED COUNT: 13.8 % (ref 11.6–15.1)
ETHANOL EXG-MCNC: 0 MG/DL
ETHANOL SERPL-MCNC: <3 MG/DL (ref 0–3)
GFR SERPL CREATININE-BSD FRML MDRD: 96 ML/MIN/1.73SQ M
GLUCOSE SERPL-MCNC: 133 MG/DL (ref 65–140)
HCT VFR BLD AUTO: 37.4 % (ref 34.8–46.1)
HGB BLD-MCNC: 12.9 G/DL (ref 11.5–15.4)
LYMPHOCYTES # BLD AUTO: 2.62 THOUSANDS/ΜL (ref 0.6–4.47)
LYMPHOCYTES NFR BLD AUTO: 33 % (ref 14–44)
MCH RBC QN AUTO: 31.7 PG (ref 26.8–34.3)
MCHC RBC AUTO-ENTMCNC: 34.5 G/DL (ref 31.4–37.4)
MCV RBC AUTO: 92 FL (ref 82–98)
MONOCYTES # BLD AUTO: 1.17 THOUSAND/ΜL (ref 0.17–1.22)
MONOCYTES NFR BLD AUTO: 15 % (ref 4–12)
NEUTROPHILS # BLD AUTO: 3.93 THOUSANDS/ΜL (ref 1.85–7.62)
NEUTS SEG NFR BLD AUTO: 47 % (ref 43–75)
NRBC BLD AUTO-RTO: 0 /100 WBCS
PLATELET # BLD AUTO: 209 THOUSANDS/UL (ref 149–390)
PMV BLD AUTO: 9.9 FL (ref 8.9–12.7)
POTASSIUM SERPL-SCNC: 3.8 MMOL/L (ref 3.5–5.3)
PROT SERPL-MCNC: 6.7 G/DL (ref 6.4–8.2)
RBC # BLD AUTO: 4.07 MILLION/UL (ref 3.81–5.12)
SALICYLATES SERPL-MCNC: 3 MG/DL (ref 3–20)
SODIUM SERPL-SCNC: 138 MMOL/L (ref 136–145)
WBC # BLD AUTO: 8.06 THOUSAND/UL (ref 4.31–10.16)

## 2017-10-08 PROCEDURE — 36415 COLL VENOUS BLD VENIPUNCTURE: CPT | Performed by: EMERGENCY MEDICINE

## 2017-10-08 PROCEDURE — 85025 COMPLETE CBC W/AUTO DIFF WBC: CPT | Performed by: EMERGENCY MEDICINE

## 2017-10-08 PROCEDURE — 81002 URINALYSIS NONAUTO W/O SCOPE: CPT | Performed by: EMERGENCY MEDICINE

## 2017-10-08 PROCEDURE — 80329 ANALGESICS NON-OPIOID 1 OR 2: CPT | Performed by: EMERGENCY MEDICINE

## 2017-10-08 PROCEDURE — 80320 DRUG SCREEN QUANTALCOHOLS: CPT | Performed by: EMERGENCY MEDICINE

## 2017-10-08 PROCEDURE — 93005 ELECTROCARDIOGRAM TRACING: CPT | Performed by: EMERGENCY MEDICINE

## 2017-10-08 PROCEDURE — 82075 ASSAY OF BREATH ETHANOL: CPT

## 2017-10-08 PROCEDURE — 80053 COMPREHEN METABOLIC PANEL: CPT | Performed by: EMERGENCY MEDICINE

## 2017-10-08 NOTE — LETTER
Section I - General Information    Name of Patient: Merline Crooked                 : 1995    Medicare #:____________________  Transport Date: 10/09/17 (PCS is valid for round trips on this date and for all repetitive trips in the 60-day range as noted below )  Origin: [address]                                                         Destination:________________________________________________  Is the pt's stay covered under Medicare Part A (PPS/DRG)     (_) YES  (_) NO  Closest appropriate facility?  (_) YES  (_) NO  If no, why is transport to more distant facility required?________________________  If hosp-hosp transfer, describe services needed at 2nd facility not available at 1st facility? _________________________________  If hospice pt, is this transport related to pt's terminal illness? (_) YES (_) NO Describe____________________________________    Section II - Medical Necessity Questionnaire  Ambulance transportation is medically necessary only if other means of transport are contraindicated or would be potentially harmful to the patient  To meet this requirement, the patient must either be "bed confined" or suffer from a condition such that transport by means other than ambulance is contraindicated by the patient's condition   The following questions must be answered by the medical professional signing below for this form to be valid:    1)  Describe the MEDICAL CONDITION (physical and/or mental) of this patient AT [address] [place] [address] that requires the patient to be transported in an ambulance and why transport by other means is contraindicated by the patient's condition:__________________________________________________________________________________________________    2) Is the patient "bed confined" as defined below?     (_) YES  (_) NO  To be "be confined" the patient must satisfy all three of the following conditions: (1) unable to get up from bed without Assistance; AND (2) unable to ambulate; AND (3) unable to sit in a chair or wheelchair  3) Can this patient safely be transported by car or wheelchair Romario Gilliam (i e , seated during transport without a medical attendant or monitoring)?   (_) YES  (_) NO    4) In addition to completing questions 1-3 above, please check any of the following conditions that apply*:  *Note: supporting documentation for any boxes checked must be maintained in the patient's medical records  (_)Contractures   (_)Non-Healed Fractures  (_)Patient is confused (_)Patient is comatose (_)Moderate/severe pain on movement (_)Danger to self/others  (_)IV meds/fluids required (_)Patient is combative(_)Need or possible need for restraints (_)DVT requires elevation of lower extremity  (_)Medical attendant required (_)Requires oxygen-unable to self administer (_)Special handling/isolation/infection control precautions required (_)Unable to tolerate seated position for time needed to transport (_)Hemodynamic monitoring required en route (_)Unable to sit in a chair or wheelchair due to decubitus ulcers or other wounds (_)Cardiac monitoring required en route (_)Morbid obesity requires additional personnel/equipment to safely handle patient (_)Orthopedic device (backboard, halo, pins, traction, brace, wedge, etc,) requiring special handling during transport (_)Other(specify)_______________________________________________    Section III - Signature of Physician or Healthcare Professional  I certify that the above information is true and correct based on my evaluation of this patient, and represent that the patient requires transport by ambulance and that other forms of transport are contraindicated   I understand that this information will be used by the Centers for Medicare and Medicaid Services (CMS) to support the determination of medical necessity for ambulance services, and I represent that I have personal knowledge of the patient's condition at time of transport  (_) If this box is checked, I also certify that the patient is physically or mentally incapable of signing the ambulance service's claim and that the institution with which I am affiliated has furnished care, services, or assistance to the patient  My signature below is made on behalf of the patient pursuant to 42 CFR §424 36(b)(4)  In accordance with 42 CFR §424 37, the specific reason(s) that the patient is physically or mentally incapable of signing the claim form is as follows: _________________________________________________________________________________________________________      Signature of Physician* or Healthcare Professional______________________________________________________________  Signature Date 10/09/17 (For scheduled repetitive transports, this form is not valid for transports performed more than 60 days after this date)    Printed Name & Credentials of Physician or Healthcare Professional (MD, DO, RN, etc )________________________________  *Form must be signed by patient's attending physician for scheduled, repetitive transports   For non-repetitive, unscheduled ambulance transports, if unable to obtain the signature of the attending physician, any of the following may sign (choose appropriate option below)  (_) Physician Assistant (_)  Clinical Nurse Specialist (_)  Registered Nurse  (_)  Nurse Practitioner  (_) Discharge Planner

## 2017-10-08 NOTE — LETTER
[address]  [address] [place] [address]  Dept: Adam WINKLER Abner Burnett                                         1995                              MRN [id number]    I have been informed of my rights regarding examination, treatment, and transfer   by Dr Millie Agarwal MD    Benefits: Specialized equipment and/or services available at the receiving facility (Include comment)________________________    Risks: Potential for delay in receiving treatment      Consent for Transfer:  I acknowledge that my medical condition has been evaluated and explained to me by the emergency department physician or other qualified medical person and/or my attending physician, who has recommended that I be transferred to the service of  Accepting Physician: Dr Piter Hallman at [address] [place] [address] Name, [address] : [facility]  The above potential benefits of such transfer, the potential risks associated with such transfer, and the probable risks of not being transferred have been explained to me, and I fully understand them  The doctor has explained that, in my case, the benefits of transfer outweigh the risks  I agree to be transferred  I authorize the performance of emergency medical procedures and treatments upon me in both transit and upon arrival at the receiving facility  Additionally, I authorize the release of any and all medical records to the receiving facility and request they be transported with me, if possible  I understand that the safest mode of transportation during a medical emergency is an ambulance and that the Hospital advocates the use of this mode of transport  Risks of traveling to the receiving facility by car, including absence of medical control, life sustaining equipment, such as oxygen, and medical personnel has been explained to me and I fully understand them      ([address] [place])  [ ]  I consent to the stated transfer and to be transported by ambulance/helicopter  [  ]  I consent to the stated transfer, but refuse transportation by ambulance and accept full responsibility for my transportation by car  I understand the risks of non-ambulance transfers and I exonerate the Hospital and its staff from any deterioration in my condition that results from this refusal     X___________________________________________    DATE  10/09/17  TIME________  Signature of patient or legally responsible individual signing on patient behalf           RELATIONSHIP TO PATIENT_________________________          Provider Certification    NAME Rose Mary Shin                                         1995                              MRN [id number]    A medical screening exam was performed on the above named patient  Based on the examination:    Condition Necessitating Transfer The primary encounter diagnosis was Su[facility] attempt by drug ingestion, [facility]  A diagnosis of Depression was also pertinent to this visit      Patient Condition: The patient has been stabilized such that within reasonable medical probability, no material deterioration of the patient condition or the condition of the unborn child(mo) is likely to result from the transfer    Reason for Transfer: Level of Care needed not available at this facility    Transfer Requirements: [place]   · Space available and qualified personnel available for treatment as acknowledged by MARISOL Carranza  · Agreed to accept transfer and to provide appropriate medical treatment as acknowledged by       Dr Jack Garcia  · Appropriate medical records of the examination and treatment of the patient are provided at the time of transfer   [address] _______  · Transfer will be performed by qualified personnel from [facility]   and appropriate transfer equipment as required, including the use of necessary and appropriate life support measures  Provider Certification: I have examined the patient and explained the following risks and benefits of being transferred/refusing transfer to the patient/family:  General risk, such as traffic hazards, adverse weather conditions, rough terrain or turbulence, possible failure of equipment (including vehicle or aircraft), or consequences of actions of persons outside the control of the transport personnel      Based on these reasonable risks and benefits to the patient and/or the unborn child(mo), and based upon the information available at the time of the patients examination, I certify that the medical benefits reasonably to be expected from the provision of appropriate medical treatments at another medical facility outweigh the increasing risks, if any, to the individuals medical condition, and in the case of labor to the unborn child, from effecting the transfer      X____________________________________________ DATE 10/09/17        TIME_______      ORIGINAL - SEND TO MEDICAL RECORDS   COPY - SEND WITH PATIENT DURING TRANSFER

## 2017-10-09 VITALS
HEART RATE: 79 BPM | OXYGEN SATURATION: 100 % | WEIGHT: 125 LBS | BODY MASS INDEX: 21.46 KG/M2 | DIASTOLIC BLOOD PRESSURE: 57 MMHG | SYSTOLIC BLOOD PRESSURE: 96 MMHG | RESPIRATION RATE: 18 BRPM | TEMPERATURE: 98.3 F

## 2017-10-09 LAB
AMPHETAMINES SERPL QL SCN: NEGATIVE
BARBITURATES UR QL: NEGATIVE
BENZODIAZ UR QL: NEGATIVE
BILIRUB UR QL STRIP: NEGATIVE
CLARITY UR: CLEAR
CLARITY, POC: CLEAR
COCAINE UR QL: NEGATIVE
COLOR UR: NORMAL
COLOR, POC: NORMAL
EXT PREG TEST URINE: NEGATIVE
GLUCOSE UR STRIP-MCNC: NEGATIVE MG/DL
HGB UR QL STRIP.AUTO: NEGATIVE
KETONES UR STRIP-MCNC: NEGATIVE MG/DL
LEUKOCYTE ESTERASE UR QL STRIP: NEGATIVE
METHADONE UR QL: NEGATIVE
NITRITE UR QL STRIP: NEGATIVE
OPIATES UR QL SCN: NEGATIVE
PCP UR QL: NEGATIVE
PH UR STRIP.AUTO: 6.5 [PH] (ref 4.5–8)
PROT UR STRIP-MCNC: NEGATIVE MG/DL
SP GR UR STRIP.AUTO: 1.01 (ref 1–1.03)
THC UR QL: POSITIVE
UROBILINOGEN UR QL STRIP.AUTO: 0.2 E.U./DL

## 2017-10-09 PROCEDURE — 99285 EMERGENCY DEPT VISIT HI MDM: CPT

## 2017-10-09 PROCEDURE — 81025 URINE PREGNANCY TEST: CPT | Performed by: EMERGENCY MEDICINE

## 2017-10-09 PROCEDURE — 81003 URINALYSIS AUTO W/O SCOPE: CPT

## 2017-10-09 PROCEDURE — 80307 DRUG TEST PRSMV CHEM ANLYZR: CPT

## 2017-10-09 RX ORDER — PAROXETINE 30 MG/1
30 TABLET, FILM COATED ORAL DAILY
Status: DISCONTINUED | OUTPATIENT
Start: 2017-10-09 | End: 2017-10-10 | Stop reason: HOSPADM

## 2017-10-09 RX ORDER — GABAPENTIN 300 MG/1
300 CAPSULE ORAL ONCE
Status: COMPLETED | OUTPATIENT
Start: 2017-10-09 | End: 2017-10-09

## 2017-10-09 RX ADMIN — PAROXETINE HYDROCHLORIDE 30 MG: 30 TABLET, FILM COATED ORAL at 14:24

## 2017-10-09 RX ADMIN — GABAPENTIN 300 MG: 300 CAPSULE ORAL at 21:48

## 2017-10-09 RX ADMIN — GABAPENTIN 300 MG: 300 CAPSULE ORAL at 14:24

## 2017-10-09 NOTE — ED NOTES
Please call with updates regarding pts care/placement   Shelton Level (Pts mother) - [address], RN  10/08/17 2389

## 2017-10-09 NOTE — ED NOTES
NICHOLAS will transfer patient at midnight to [facility]  Krishna Gavin at [facility] informed of same

## 2017-10-09 NOTE — ED NOTES
Pt being changed into blue scrubs at this time  Room emptied   Belongings in Zone 5 med room     Russell Rodriguez RN  10/08/17 [facility] Cassie Carter RN  10/08/17 9545

## 2017-10-09 NOTE — ED NOTES
Pt belongings in a orange bag  Contents of the bag a note book, blanket, deodorant, stuff animal, various clothing   [facility]  10/09/17 1023

## 2017-10-09 NOTE — ED NOTES
Mother brought patient more belongings, belongings checked and inventoried and placed on zone 5/6 cabinets        Zaina Hendrickson  10/09/17 1010

## 2017-10-09 NOTE — ED NOTES
Hygiene products, new scrubs and towels provided to patient since she wanted to shower  Patient walked over to zone 2 bathroom to shower       Jody Lerner  10/09/17 1024

## 2017-10-09 NOTE — ED NOTES
Received call from [place] at [facility] officially given the go ahead to set up transfer  Call placed to [place], spoke with Graham Ragland, who stated that she would contact the [facility] if they are available for transfer  [facility] is unavailable to transport pt today

## 2017-10-09 NOTE — ED NOTES
Pt accepted to [facility], by Dr Megan Flores, per Mariana Brandon  Awaiting call back with details on transfer time

## 2017-10-09 NOTE — ED NOTES
Per Kathy Tsai at [facility], they will be accepting the pt, although she will not be able to go until later on today  Kathy Tsai will call back when the bed is available

## 2017-10-09 NOTE — ED NOTES
Calls placed to the following facilities:     [place]: Spoke with Socorro Guan, who stated that they have 1 bed available and will review  Clinical faxed  [place]: Spoke with Veena Piper who stated that they have no F beds today  [facility]: Per Tianna Arredondo, they will know bed availability at [place] [address] [facility]: Spoke with Tyson Yuan who requested clinical be faxed  [facility]: Call goes directly to rena/izabel Burk: Per Cal[place], they do not have any F beds at this time  [facility]: Spoke with Fatou Zambrano who stated that they have a bed and would review  Clinical faxed

## 2017-10-09 NOTE — ED NOTES
Pt remains on 1:1 observation status     Tyson Maldonadoenthal, [facility]  10/09/17 4509

## 2017-10-09 NOTE — ED NOTES
Back to the room from the shower  Patient requesting to watch tv, tv/call bell cord provided since patient has been compliant and and 1:1 is in place       Jorge Mueller  10/09/17 [address]  10/09/17 7730

## 2017-10-09 NOTE — ED PROVIDER NOTES
History  Chief Complaint   Patient presents with    Overdose - Intentional     pt took 60tabs of 50mg hydroxyzine approx 30min ago after smoking marijuanna  SI attempt  mother would like to 36     Patient is a 25year old female with a past medical history of psychiatric disorder, previous suicide attempt who presents after taking 60 tablets of 50 mg hydroxizine (atarax) approximately 1 hour PTA  Patient reports depression and suicidal attempt by taking the pills  Denies EtOH/ drug use  Admits that the voices in her head told her to take the pills and kill herself  Denies visual hallucinations  Also sratched her right forearm today to "feel something"  Denies CP, palpitations, SOB, N/V, F/C  Assessment and Plan: Suicide attempt via overdose with hydroxyzine  Will check EKG to rule out QRS/ QT prolongation, basic labs to evaluate electrolytes, coma panel, monitor for anticholinergic symptoms, though displaying none at this time  Will then need psychiatric evaluation/ admission  Prior to Admission Medications   Prescriptions Last Dose Informant Patient Reported? Taking? PARoxetine (PAXIL) 30 mg tablet   No No   Sig: Take 1 tablet by mouth daily At 9AM   gabapentin (NEURONTIN) 300 mg capsule 10/8/2017 at morning  No Yes   Sig: Take 1 capsule by mouth 3 (three) times a day At 9AM, 4PM, 9PM   Patient taking differently: Take 300 mg by mouth 2 (two) times a day At 9AM, 4PM, 9PM    hydrOXYzine HCL (ATARAX) 50 mg tablet 10/8/2017 at morningtime  No Yes   Sig: Take 1 tablet by mouth every 6 (six) hours as needed for anxiety   traZODone (DESYREL) 50 mg tablet 10/8/2017 at morning  No Yes   Sig: Take 1 tablet by mouth daily at bedtime as needed for sleep      Facility-Administered Medications: None       Past Medical History:   Diagnosis Date    Anxiety     Depression        History reviewed  No pertinent surgical history      Family History   Problem Relation Age of Onset    Bipolar disorder Mother    Nickolasludy Chawladwell Drug abuse Mother     Alcohol abuse Mother      I have reviewed and agree with the history as documented  Social History   Substance Use Topics    Smoking status: Current Every Day Smoker     Packs/day: 0 20    Smokeless tobacco: Never Used    Alcohol use Yes      Comment: occasional        Review of Systems   Constitutional: Negative for chills, diaphoresis and fever  HENT: Negative for congestion  Eyes: Negative for visual disturbance  Respiratory: Negative for shortness of breath  Cardiovascular: Negative for chest pain and palpitations  Gastrointestinal: Negative for abdominal pain, constipation, diarrhea, nausea and vomiting  Genitourinary: Negative for dysuria and hematuria  Musculoskeletal: Negative for back pain and neck pain  Skin: Positive for wound  Neurological: Negative for dizziness, light-headedness and headaches  Psychiatric/Behavioral: Positive for suicidal ideas  All other systems reviewed and are negative  Physical Exam  ED Triage Vitals   Temperature Pulse Respirations Blood Pressure SpO2   10/08/17 2309 10/08/17 2309 10/08/17 2309 10/08/17 2309 10/08/17 2309   98 °F (36 7 °C) 77 18 105/58 99 %      Temp Source Heart Rate Source Patient Position - Orthostatic VS BP Location FiO2 (%)   10/09/17 0940 10/08/17 2309 10/08/17 2309 10/08/17 2309 --   Tympanic Monitor Lying Right arm       Pain Score       10/09/17 0029       No Pain           Physical Exam   Constitutional: She is oriented to person, place, and time  She appears well-developed and well-nourished  No distress  HENT:   Head: Normocephalic and atraumatic  Right Ear: External ear normal    Left Ear: External ear normal    Nose: Nose normal    Mouth/Throat: Oropharynx is clear and moist  No oropharyngeal exudate  Eyes: Conjunctivae and EOM are normal  Pupils are equal, round, and reactive to light  Right eye exhibits no nystagmus  Left eye exhibits no nystagmus  Neck: Normal range of motion   Neck supple  Cardiovascular: Normal rate, regular rhythm, normal heart sounds and intact distal pulses  Exam reveals no gallop and no friction rub  No murmur heard  Pulmonary/Chest: Effort normal and breath sounds normal  No respiratory distress  She has no wheezes  She has no rales  She exhibits no tenderness  Abdominal: Soft  Bowel sounds are normal  She exhibits no distension  There is no tenderness  Musculoskeletal: Normal range of motion  She exhibits no edema  Neurological: She is alert and oriented to person, place, and time  She has normal strength and normal reflexes  No sensory deficit  Coordination normal  GCS eye subscore is 4  GCS verbal subscore is 5  GCS motor subscore is 6  Moves all 4 extremities  No clonus   Skin: Skin is warm and dry  Capillary refill takes less than 2 seconds  No rash noted  She is not diaphoretic  No erythema  No pallor  Psychiatric: She has a normal mood and affect  Her behavior is normal    Nursing note and vitals reviewed  ED Medications  Medications   gabapentin (NEURONTIN) capsule 300 mg (300 mg Oral Given 10/9/17 1424)   gabapentin (NEURONTIN) capsule 300 mg (300 mg Oral Given 10/9/17 2148)       Diagnostic Studies  Labs Reviewed   RAPID DRUG SCREEN, URINE - Abnormal        Result Value Ref Range Status    THC Urine Positive (*) Negative Final    Amph/Meth UR Negative  Negative Final    Barbiturate Ur Negative  Negative Final    Benzodiazepine Urine Negative  Negative Final    Cocaine Urine Negative  Negative Final    Methadone Urine Negative  Negative Final    Opiate Urine Negative  Negative Final    PCP Ur Negative  Negative Final    Narrative:     Presumptive report  If requested, specimen will be sent to reference lab for confirmation  FOR MEDICAL PURPOSES ONLY  IF CONFIRMATION NEEDED PLEASE CONTACT THE LAB WITHIN 5 DAYS      Drug Screen Cutoff Levels:  AMPHETAMINE/METHAMPHETAMINES  1000 ng/mL  BARBITURATES     200 ng/mL  BENZODIAZEPINES     200 ng/mL  COCAINE      300 ng/mL  METHADONE      300 ng/mL  OPIATES      300 ng/mL  PHENCYCLIDINE     25 ng/mL  THC       50 ng/mL   CBC AND DIFFERENTIAL - Abnormal     Monocytes Relative 15 (*) 4 - 12 % Final    WBC 8 06  4 31 - 10 16 Thousand/uL Final    RBC 4 07  3 81 - 5 12 Million/uL Final    Hemoglobin 12 9  11 5 - 15 4 g/dL Final    Hematocrit 37 4  34 8 - 46 1 % Final    MCV 92  82 - 98 fL Final    MCH 31 7  26 8 - 34 3 pg Final    MCHC 34 5  31 4 - 37 4 g/dL Final    RDW 13 8  11 6 - 15 1 % Final    MPV 9 9  8 9 - 12 7 fL Final    Platelets 016  312 - 390 Thousands/uL Final    nRBC 0  /100 WBCs Final    Neutrophils Relative 47  43 - 75 % Final    Lymphocytes Relative 33  14 - 44 % Final    Eosinophils Relative 4  0 - 6 % Final    Basophils Relative 1  0 - 1 % Final    Neutrophils Absolute 3 93  1 85 - 7 62 Thousands/µL Final    Lymphocytes Absolute 2 62  0 60 - 4 47 Thousands/µL Final    Monocytes Absolute 1 17  0 17 - 1 22 Thousand/µL Final    Eosinophils Absolute 0 28  0 00 - 0 61 Thousand/µL Final    Basophils Absolute 0 04  0 00 - 0 10 Thousands/µL Final   ACETAMINOPHEN LEVEL - Abnormal     Acetaminophen Level <2 (*) 10 - 30 ug/mL Final   MEDICAL ALCOHOL - Normal    Ethanol Lvl <3  0 - 3 mg/dL Final   SALICYLATE LEVEL - Normal    Salicylate Lvl 3  3 - 20 mg/dL Final   POCT ALCOHOL BREATH TEST - Normal    EXTBreath Alcohol 0 000   Final   POCT URINALYSIS DIPSTICK - Normal    Color, UA light orange   Final    Clarity, UA clear   Final   POCT PREGNANCY, URINE - Normal    EXT PREG TEST UR (Ref: Negative) negative   Final   ED URINE MACROSCOPIC - Normal    Color, UA Orange   Final    Clarity, UA Clear   Final    pH, UA 6 5  4 5 - 8 0 Final    Leukocytes, UA Negative  Negative Final    Nitrite, UA Negative  Negative Final    Protein, UA Negative  Negative mg/dl Final    Glucose, UA Negative  Negative mg/dl Final    Ketones, UA Negative  Negative mg/dl Final    Urobilinogen, UA 0 2  0 2, 1 0 E U /dl E U /dl Final Bilirubin, UA Negative  Negative Final    Blood, UA Negative  Negative Final    Specific [place], UA 1 010  1 003 - 1 030 Final    Narrative:     CLINITEK RESULT   COMPREHENSIVE METABOLIC PANEL    Sodium 866  136 - 145 mmol/L Final    Potassium 3 8  3 5 - 5 3 mmol/L Final    Chloride 104  100 - 108 mmol/L Final    CO2 28  21 - 32 mmol/L Final    Anion Gap 6  4 - 13 mmol/L Final    BUN 11  5 - 25 mg/dL Final    Creatinine 0 86  0 60 - 1 30 mg/dL Final    Comment: Standardized to IDMS reference method    Glucose 133  65 - 140 mg/dL Final    Comment:   If the patient is fasting, the ADA then defines impaired fasting glucose as > 100 mg/dL and diabetes as > or equal to 123 mg/dL  Specimen collection should occur prior to Sulfasalazine administration due to the potential for falsely depressed results  Specimen collection should occur prior to Sulfapyridine administration due to the potential for falsely elevated results  Calcium 8 5  8 3 - 10 1 mg/dL Final    AST 23  5 - 45 U/L Final    Comment:   Specimen collection should occur prior to Sulfasalazine administration due to the potential for falsely depressed results  ALT 47  12 - 78 U/L Final    Comment:   Specimen collection should occur prior to Sulfasalazine and/or Sulfapyridine administration due to the potential for falsely depressed results  Alkaline Phosphatase 67  46 - 116 U/L Final    Total Protein 6 7  6 4 - 8 2 g/dL Final    Albumin 3 8  3 5 - 5 0 g/dL Final    Total Bilirubin 0 30  0 20 - 1 00 mg/dL Final    eGFR 96  ml/min/1 73sq m Final    Narrative:     National Kidney Disease Education Program recommendations are as follows:  GFR calculation is accurate only with a steady state creatinine  Chronic Kidney disease less than 60 ml/min/1 73 sq  meters  Kidney failure less than 15 ml/min/1 73 sq  meters  COMA PANEL    Narrative: The following orders were created for panel order Coma panel    Procedure Abnormality         Status                     ---------                               -----------         ------                     Ethanol[[id number]]                       Normal              Final result               Salicylate SIHLJ[[id number]]              Normal              Final result               Acetaminophen level[[id number]]           Abnormal            Final result                 Please view results for these tests on the individual orders  No orders to display       Procedures  ECG 12 Lead Documentation  Date/Time: 10/8/2017 11:47 PM  Performed by: Rashaad Ann by: Omayra Galeano     Indications / Diagnosis:  Overdose   ECG reviewed by me, the ED Provider: yes    Patient location:  ED  Previous ECG:     Previous ECG:  Unavailable  Interpretation:     Interpretation: non-specific    Rate:     ECG rate:  75    ECG rate assessment: normal    Rhythm:     Rhythm: sinus rhythm    Ectopy:     Ectopy: none    QRS:     QRS axis:  Normal    QRS intervals:  Normal  Conduction:     Conduction: normal    ST segments:     ST segments:  Normal  T waves:     T waves: flattening      Flattening:  AVL, V3 and V2          Phone Consults  ED Phone Contact    ED Course  ED Course as of Oct 11 1315   Mon Oct 09, 2017   0106 4 am medical observation over- psych eval    [phone] signed                                  MDM  CritCare Time    Disposition  Final diagnoses:   Suicide attempt by drug ingestion, initial encounter [facility])   Depression     ED Disposition     ED Disposition Condition Comment    Transfer to Another Niyah Bicker should be transferred out to [facility]  [address] Most Recent Value   Patient Condition  The patient has been stabilized such that within reasonable medical probability, no material deterioration of the patient condition or the condition of the unborn child(mo) is likely to result from the transfer   Reason for Transfer  Level of Care needed not available at this facility   Benefits of Transfer  Specialized equipment and/or services available at the receiving facility (Include comment)________________________   Risks of Transfer  Potential for delay in receiving treatment   Accepting Physician  Dr Gianni Monet Name, [id number]    (Name & Tel number)  MARISOL Gonzales   Transported by (Company and Unit #)  [employer]    Sending MD  [place]   Provider Certification  General risk, such as traffic hazards, adverse weather conditions, rough terrain or turbulence, possible failure of equipment (including vehicle or aircraft), or consequences of actions of persons outside the control of the transport personnel      RN Documentation    Flowsheet Row [address] [facility] Name, [id number]    (Name & Tel number)  MARISOL Gonzales   Transport Mode  Ambulance   Transported by [employer] and Unit #)  [facility]    Level of Care  Basic life support   Transfer Date  10/10/17   Transfer Time  1430      Follow-up Information    None       Discharge Medication List as of 10/9/2017 11:08 PM      CONTINUE these medications which have NOT CHANGED    Details   gabapentin (NEURONTIN) 300 mg capsule Take 1 capsule by mouth 3 (three) times a day At 9AM, 4PM, 9PM, Starting Thu 9/14/2017, Print      hydrOXYzine HCL (ATARAX) 50 mg tablet Take 1 tablet by mouth every 6 (six) hours as needed for anxiety, Starting Thu 9/14/2017, Print      traZODone (DESYREL) 50 mg tablet Take 1 tablet by mouth daily at bedtime as needed for sleep, Starting Thu 9/14/2017, Print      PARoxetine (PAXIL) 30 mg tablet Take 1 tablet by mouth daily At 9AM, Starting Thu 9/14/2017, Print           No discharge procedures on file  ED Provider  Attending physically available and evaluated Becca Zia TILLMAN managed the patient along with the ED Attending      Electronically Signed by       Ivette Jean DO  Resident  10/11/17 1808

## 2017-10-09 NOTE — ED NOTES
ECG completed at 2307  Viewed and signed by Dr Nomi Lyons  Copy on chart       [facility]  10/08/17 0447

## 2017-10-09 NOTE — ED ATTENDING ATTESTATION
Rahul Lira MD, saw and evaluated the patient  I have discussed the patient with the resident/non-physician practitioner and agree with the resident's/non-physician practitioner's findings, Plan of Care, and MDM as documented in the resident's/non-physician practitioner's note, except where noted  All available labs and Radiology studies were reviewed  At this point I agree with the current assessment done in the Emergency Department    I have conducted an independent evaluation of this patient a history and physical is as follows:    Took hydroxazine  About an hour ago    No symptoms    States wanted to hurt self     No fever no vomiting   No HA no abd pain no other c/o   Exam:  Patient is in no acute distress pupils equal round reactive to light mucous membranes are moist lungs clear heart regular abdomen soft nontender extremities nontender neurologic exam nonfocal  Impression : overdose  antihistamines by history  Critical Care Time  CritCare Time

## 2017-10-31 LAB
ATRIAL RATE: 75 BPM
P AXIS: 60 DEGREES
PR INTERVAL: 160 MS
QRS AXIS: 59 DEGREES
QRSD INTERVAL: 80 MS
QT INTERVAL: 384 MS
QTC INTERVAL: 428 MS
T WAVE AXIS: 43 DEGREES
VENTRICULAR RATE: 75 BPM

## 2017-11-26 ENCOUNTER — HOSPITAL ENCOUNTER (EMERGENCY)
Facility: HOSPITAL | Age: 22
Discharge: HOME | End: 2017-11-26
Admitting: EMERGENCY MEDICINE
Payer: COMMERCIAL

## 2017-11-26 VITALS
HEART RATE: 94 BPM | WEIGHT: 135 LBS | DIASTOLIC BLOOD PRESSURE: 84 MMHG | BODY MASS INDEX: 23.92 KG/M2 | HEIGHT: 63 IN | SYSTOLIC BLOOD PRESSURE: 133 MMHG | OXYGEN SATURATION: 100 % | RESPIRATION RATE: 18 BRPM

## 2017-11-26 DIAGNOSIS — Z20.2 POSSIBLE EXPOSURE TO STD: Primary | ICD-10-CM

## 2017-11-26 DIAGNOSIS — N92.6 MISSED MENSES: ICD-10-CM

## 2017-11-26 LAB — EXT PREG TEST URINE: NEGATIVE

## 2017-11-26 PROCEDURE — 99283 EMERGENCY DEPT VISIT LOW MDM: CPT

## 2017-11-26 PROCEDURE — 96372 THER/PROPH/DIAG INJ SC/IM: CPT

## 2017-11-26 PROCEDURE — 81025 URINE PREGNANCY TEST: CPT | Performed by: PHYSICIAN ASSISTANT

## 2017-11-26 PROCEDURE — 87591 N.GONORRHOEAE DNA AMP PROB: CPT | Performed by: PHYSICIAN ASSISTANT

## 2017-11-26 PROCEDURE — 87491 CHLMYD TRACH DNA AMP PROBE: CPT | Performed by: PHYSICIAN ASSISTANT

## 2017-11-26 RX ORDER — BUSPIRONE HYDROCHLORIDE 30 MG/1
30 TABLET ORAL 2 TIMES DAILY
COMMUNITY
End: 2020-01-27 | Stop reason: HOSPADM

## 2017-11-26 RX ORDER — ARIPIPRAZOLE 10 MG/1
10 TABLET ORAL DAILY
COMMUNITY
End: 2020-01-27 | Stop reason: HOSPADM

## 2017-11-26 RX ORDER — AZITHROMYCIN 250 MG/1
1000 TABLET, FILM COATED ORAL ONCE
Status: COMPLETED | OUTPATIENT
Start: 2017-11-26 | End: 2017-11-26

## 2017-11-26 RX ORDER — MIRTAZAPINE 30 MG/1
15 TABLET, FILM COATED ORAL
COMMUNITY
End: 2020-01-27 | Stop reason: HOSPADM

## 2017-11-26 RX ADMIN — LIDOCAINE HYDROCHLORIDE 250 MG: 10 INJECTION, SOLUTION EPIDURAL; INFILTRATION; INTRACAUDAL; PERINEURAL at 10:57

## 2017-11-26 RX ADMIN — AZITHROMYCIN 1000 MG: 250 TABLET, FILM COATED ORAL at 10:56

## 2017-11-26 NOTE — DISCHARGE INSTRUCTIONS
emergency  Follow up with your healthcare provider as directed: If you did not receive any treatment after the exposure, you will need to follow up with your healthcare provider within 1 week  If you were given antiretrovirals, you will need a follow-up visit in about 2 weeks  Further HIV testing will be needed 6 weeks, 3 months, and 6 months after the exposure  You may have HIV testing up to 12 months after the exposure  Precautions: In case you are infected, you will need to take steps to keep others from getting sick  These steps can help you avoid being exposed again:  · Avoid sex, or have safe sex  Safe sex means having sex only with one person who is not infected and who is only having sex with you  It also means using condoms each time you have sex  · Avoid injection drug use  If you cannot do this, always use needles that are sterile (germ-free)  · Follow all safety rules at your workplace if you are at risk of exposure  Be sure to use safety equipment as needed  · Get the hepatitis B vaccine if you have not already  · Do not breastfeed unless you know you are not infected  In case of future exposure: If you think you have been exposed, get first aid right away  If you are at work, follow work policy to report the exposure  The type of first aid you need depends on what part of your body was exposed:  · Open skin:  Wash the area right away with soap and water  Use a gel hand  if you do not have soap or running water  Do not use anything harsh, such as bleach  Do not squeeze or rub the skin  · Eyes:  Rinse your eyes with water or saline (a salt solution) right away  Be sure to clean well by moving your eyelids with your fingers as you rinse  Keep contact lenses in while rinsing your eyes, then remove and clean them as usual  Avoid soap or other   · Mouth:  Spit out the blood or body fluid right away  Rinse your mouth with water or saline several times   Avoid putting soap or other  in your mouth  For support and more information: It can be hard to cope if you think you have been exposed to a disease  You may feel scared and concerned about your health  This is normal  It can be helpful to find out all you can about the risk of exposure  Counseling or joining a support group also can help  Talk to your healthcare provider, or contact the following:   · [facility] for HIV/AIDS, Viral Hepatitis, STD, and TB Prevention, [facility]  Web Address: www [facility]/  · The [employer]' Post-Exposure Prophylaxis Hotline  Web Address: www [facility]/about_[facility]/pepline/  Contact your healthcare provider if:   · You have nausea or diarrhea  · You are more tired than usual      · You have new headaches, or you feel dizzy  · You have trouble sleeping  · Your eyes or skin turn yellow  · You are not eating because of appetite loss  · You are or may be pregnant  Return to the emergency department if:   · You have a fever  · You have a rash  · You have new muscle pain or pain in your back or abdomen  · You urinate more often than usual, have blood in your urine, or have pain while urinating  · You are more thirsty than usual      · You have trouble swallowing or breathing  © 2017 [address]  Information is for End User's use only and may not be sold, redistributed or otherwise used for commercial purposes  All illustrations and images included in CareNotes® are the copyrighted property of A D A M , Inc  or Jony Dorsey  The above information is an  only  It is not intended as medical advice for individual conditions or treatments  Talk to your doctor, nurse or pharmacist before following any medical regimen to see if it is safe and effective for you

## 2017-11-26 NOTE — ED PROVIDER NOTES
History  Chief Complaint   Patient presents with    Exposure to STD     Patient found out that current partner has been with "someone who is disgusting"  Would like to be checked for STD's     Patient reports that 2 days ago she found out that her ex-boyfriend with whom she was having unprotected sex was also having sex with someone who has "had a lot of STDs " Patient requesting to be treated and tested  Also c/o being 11 days late for her menses, LMP 10/13  Denies abdominal pain, vaginal bleeding, pain or discharge  No fevers, no dysuria  Denies hx of STI  Prior to Admission Medications   Prescriptions Last Dose Informant Patient Reported? Taking? ARIPiprazole (ABILIFY) 10 mg tablet   Yes Yes   Sig: Take 10 mg by mouth daily   busPIRone (BUSPAR) 30 MG tablet   Yes Yes   Sig: Take 30 mg by mouth 2 (two) times a day   gabapentin (NEURONTIN) 300 mg capsule   No Yes   Sig: Take 1 capsule by mouth 3 (three) times a day At 9AM, 4PM, 9PM   Patient taking differently: Take 1,600 mg by mouth daily At 9AM, 4PM, 9PM    mirtazapine (REMERON) 30 mg tablet   Yes Yes   Sig: Take 15 mg by mouth daily at bedtime   traZODone (DESYREL) 50 mg tablet   No Yes   Sig: Take 1 tablet by mouth daily at bedtime as needed for sleep      Facility-Administered Medications: None       Past Medical History:   Diagnosis Date    Anxiety     Depression        History reviewed  No pertinent surgical history  Family History   Problem Relation Age of Onset    Bipolar disorder Mother     Drug abuse Mother     Alcohol abuse Mother      I have reviewed and agree with the history as documented  Social History   Substance Use Topics    Smoking status: Current Every Day Smoker     Packs/day: 0 20     Types: Cigarettes    Smokeless tobacco: Never Used    Alcohol use Yes      Comment: occasional        Review of Systems   Constitutional: Negative for activity change  Eyes: Negative for visual disturbance     Respiratory: Negative for shortness of breath  Cardiovascular: Negative for chest pain  Genitourinary: Positive for menstrual problem  Negative for dysuria, pelvic pain, vaginal discharge and vaginal pain  Musculoskeletal: Negative for back pain  Skin: Negative for color change  Neurological: Negative for dizziness and headaches  Psychiatric/Behavioral: Negative for behavioral problems  Physical Exam  ED Triage Vitals [11/26/17 1015]   Temp Pulse Respirations Blood Pressure SpO2   -- 94 18 133/84 100 %      Temp src Heart Rate Source Patient Position - Orthostatic VS BP Location FiO2 (%)   -- -- -- Left arm --      Pain Score       1           Orthostatic Vital Signs  Vitals:    11/26/17 1015   BP: 133/84   Pulse: 94       Physical Exam   Constitutional: She is oriented to person, place, and time  She appears well-developed and well-nourished  No distress  HENT:   Head: Normocephalic and atraumatic  Eyes: Conjunctivae and EOM are normal    Cardiovascular: Normal rate and regular rhythm  No murmur heard  Pulmonary/Chest: Effort normal  No respiratory distress  Abdominal: Soft  She exhibits no distension and no mass  There is no tenderness  There is no guarding  Musculoskeletal: Normal range of motion  Neurological: She is alert and oriented to person, place, and time  Skin: Skin is warm and dry  No rash noted  Psychiatric: She has a normal mood and affect  Her behavior is normal        ED Medications  Medications   azithromycin (ZITHROMAX) tablet 1,000 mg (1,000 mg Oral Given 11/26/17 1056)   cefTRIAXone (ROCEPHIN) 250 mg in lidocaine (PF) (XYLOCAINE-MPF) 1 % IM only syringe (250 mg Intramuscular Given 11/26/17 1057)       Diagnostic Studies  Results Reviewed     Procedure Component Value Units Date/Time    Chlamydia/GC amplified DNA by PCR [[id number]] Collected:  11/26/17 1101    Lab Status:   In process Specimen:  Urine from Urine, Other Updated:  11/26/17 1109    POCT pregnancy, urine [[id number]]  (Normal) Resulted:  11/26/17 1106    Lab Status:  Final result Updated:  11/26/17 1106     EXT PREG TEST UR (Ref: Negative) NEGATIVE                 No orders to display              Procedures  Procedures       Phone Contacts  ED Phone Contact    ED Course  ED Course                                MDM  CritCare Time    Disposition  Final diagnoses:   Possible exposure to STD   Missed menses     Time reflects when diagnosis was documented in both MDM as applicable and the Disposition within this note     Time User Action Codes Description Comment    11/26/2017 11:10 AM Darlin Palma Add [Z20 2] Possible exposure to STD     11/26/2017 11:10 AM Darlin Palma Add [N92 6] Missed menses       ED Disposition     ED Disposition Condition Comment    Discharge  [address] discharge to home/self care  Condition at discharge: Stable        Follow-up Information     Follow up With Specialties Details Why Contact Info Additional Information    Emilia Martinez MD Internal Medicine Schedule an appointment as soon as possible for a visit further STD testing if desired [address] [facility] Emergency Department Emergency Medicine  As needed, If symptoms worsen [facility]  [phone]  ED, [address] [place] [address], [phone]        Patient's Medications   Discharge Prescriptions    No medications on file     No discharge procedures on file      ED Provider  Electronically Signed by           Catie Hinojosa PA-C  11/26/17 1111

## 2017-11-27 LAB
CHLAMYDIA DNA CVX QL NAA+PROBE: NORMAL
N GONORRHOEA DNA GENITAL QL NAA+PROBE: NORMAL

## 2018-01-09 NOTE — PSYCH
History of Present Illness  [facility] Clinical Progress Note [facility]:   Specialized Services Documentation - Therapist must complete separate progress note for each specific clinical activity in which the client participated during the day  Case Management Note:   0800 Lucia Aquino is NC/NS today and discharged today AMA  Letter to be sent  Medications not changed/added/denied  RADHA Wheat Follow-up Physician's Orders:   9/25/2017  8:38 AM Discharge AMA   MD Melony Armenta RN      Active Problems    1  ANA (generalized anxiety disorder) (300 02) (F41 1)   2  Insomnia due to mental disorder (300 9,327 02) (F51 05)   3  Recurrent major depressive disorder, in partial remission (296 35) (F33 41)    Past Medical History    1  History of Fracture, ankle (824 8) (S82 639A)   2  Denied: History of concussion   3  Denied: History of seizure   4  History of Missed menses (626 4) (N92 6)   5  History of Mononucleosis (075) (B27 90)    Allergies    1  Codeine Derivatives   2  Reglan TABS    Current Meds   1  B-12 1000 MCG Oral Capsule; Therapy: 81Lvu0524 to Recorded   2  Folic Acid 1 MG Oral Tablet; Therapy: 96Fcw9151 to Recorded   3  Gabapentin 300 MG Oral Capsule; tid; Therapy: 28Wfb9766 to Recorded   4  HydrOXYzine HCl - 50 MG Oral Tablet; 1 tab q6 hr PRN anxiety; Therapy: 28Jjd1254 to Recorded   5  PARoxetine HCl - 30 MG Oral Tablet; TAKE 1 TABLET DAILY AS DIRECTED; Therapy: 45Anm7356 to (Evaluate:44Poe5036) Recorded   6  RisperDAL 1 MG Oral Tablet; Therapy: (Recorded:01Usg9764) to Recorded   7  TraZODone HCl - 50 MG Oral Tablet; 1/2 - 1 tab nightly as needed for insomnia; Therapy: 87Igt2621 to Recorded    Family Psych History  Mother    1  Family history of borderline personality disorder (V17 0) (Z81 8)   2  Family history of substance abuse (V17 0) (Z81 4)  Father    3  Family history of EtOH dependence   4   Family history of psychosis (V17 0) (Z81 8)  Family History    5  Family history of Anemia   6  Family history of Anxiety   7  Family history of Arthritis   8  Family history of Depression   9  Family history of Diabetes   10  Family history of Heart disease   11  Family history of Hypertension, benign   12  Family history of Neurologic disorder   15  Family history of Osteoporosis   14   Family history of Pulmonary disease    Social History    · Caffeine use (V49 89) (F15 90)   · Exercise frequency (times/week)   · History of marijuana use (305 23) (Z87 898)   · Sexually active   · Single   · Student   · Tobacco use (305 1) (Z72 0)    Signatures   Electronically signed by : BRE Garcia ; Sep 25 2017  8:38AM EST                       (Author)    Electronically signed by : Kyree Mccarty RN; Sep 25 2017 10:58AM EST                       (Author)    Electronically signed by : Kyree Mccarty RN; Sep 25 2017 10:59AM EST                       (Author)

## 2018-01-10 NOTE — PSYCH
[facility] Treatment Plan    [facility] Treatment Plan   AREAS OF NEEDS: Depression and anxiety as manifested by recent suicide attempt and hospitalization, recent history of binge drinking, purging on a daily basis and scraping a knife against herself, use of marijuana and Xanax (not order by her PCP), stress from work including anger and stress from recent unhealthy relationship with male friend that has ended  Date Initiated: 9/15/17     LONG TERM GOAL: 1 0 I will identify three signs that my depression and anxiety have improved and that I feel more hopeful to cope, in healthy ways, with daily life  2 0 I will continue to support my sobriety throughout treatment  Date Initiated: 9/15/17   Target Date: 10/13/17      Date Initiated: 9/15/17    1 1 I will name three healthy things I need to do, on a daily basis, to take care of myself  Target Date: 17    Date Initiated: 9/15/17    1 2 I will identify two healthy coping strategies I can use when I am feeling anxious or depressed and practice at least one of these strategies daily  Target Date: 17    Date Initiated: 9/15/17    1 3 I will take medication as directed and I will raise any questions or concerns I have about my medications as they arise  Target Date: 17    Date Initiated: 9/15/17    1 4 I will identify three supports and describe how each of my supports will assist in my recovery  Target Date: 17    Date Initiated: 9/15/17    2 1 I will identify three benefits and three consequences of using alcohol or marijuana/other illegal drugs as a coping mechanism  Target Date: 17                        PSYCHIATRY: Medication management and education   Date Initiated: 2017    The person(s) responsible for carrying out the plan is Robert Hay DO    NURSIN 1,1 2,1 3,1 4 This RN will provide daily wellness group five days weekly to educate Yeimy on S/S of her diagnoses and medications used in treatment     Date Initiated: 9/15/17        The person(s) responsible for carrying out the plan is Glorine Gaucher, RN    PSYCHOLOGY: 1 1, 1 2, 1 4 Provide psychotherapy group 5 times per week to allow opportunity for Yeimy to explore stressors and ways of coping  Date Initiated: 9/15/2017    The person(s) responsible for carrying out the plan is MARISOL Browning LSW  ALLIED THERAPY: 1 1, 1 2 Provide Yeimy AT group 4-5 times weekly to aid in understanding and use of wellness tools through engagement and discussion of meaningful tasks  GENESIS Vidal     Date Initiated: 9/15/17    The person(s) responsible for carrying out the plan is Ernesta Hamman, MT-BC  SUBSTANCE ABUSE: 2 1 Offer co-occurring group twice weekly to encourage Yeimy to explore consequences of using alcohol or marijuana as a coping strategy  Date Initiated: 9/15/17      The person(s) responsible for carrying out the plan is Glorine Gaucher, RN  CASE MANAGEMENT: 1 0 This CM will meet with Yeimy to assess progress in Program, D/C planning, UR and building supports  Date Initiated: 9/15/17    The person(s) responsible for carrying out the plan is Glorine Gaucher, RN  TREATMENT REVIEW/COMMENTS: Carlos Eduardo Gregg did not complete treatment goals as she left [facility] after third day  DISCHARGE CRITERIA: I will identify three signs of improvement and I will complete my relapse prevention plan  DISCHARGE PLAN: Outpatient providers and support groups for depression and for sobriety  Estimated Length of Stay: 10 days   Strengths: Motivated to work in Program to learn and practice strategies   Limitations: Early sobriety   Diagnosis and Treatment Plan explained to patient, patient relates understanding diagnosis and is agreeable to Treatment Plan           CLIENT COMMENTS / Please share your thoughts, feelings, need and/or experiences regarding your treatment plan: _____________________________________________________________________________________________________________________________________________________________________________________________________________________________________________________________________________________________________________________ Date/Time: ______________           Patient Signature: _________________________________ Date/Time: ______________            Signatures   Electronically signed by : Ruth Fulton MSWLSW; Sep 15 2017  9:31AM EST                       (Author)    Electronically signed by : GENESIS Mendoza; Sep 15 2017 11:06AM EST                       (Author)    Electronically signed by : MORTEZA Duncan; Sep 15 2017  3:14PM EST                       (Author)    Electronically signed by : Cassi Jackson RN; Sep 18 2017  1:33PM EST                       (Author)    Electronically signed by : Jonathon Rodriguez DO; Sep 18 2017  4:16PM EST                       (Author)    Electronically signed by : Cassi Jackson RN; Oct  3 2017 10:21AM EST                       (Author)

## 2018-01-12 NOTE — PSYCH
History of Present Illness  [facility] Clinical Progress Note [facility]:   Specialized Services Documentation - Therapist must complete separate progress note for each specific clinical activity in which the client participated during the day  Case Management Note:   0800 [employer] called to say she will not attend Program today as she is feeling ill (upper respiratory)  Medications not changed/added/denied  Krystal Urrutia RN      Active Problems    1  ANA (generalized anxiety disorder) (300 02) (F41 1)   2  Insomnia due to mental disorder (300 9,327 02) (F51 05)   3  Recurrent major depressive disorder, in partial remission (296 35) (F33 41)    Past Medical History    1  History of Fracture, ankle (824 8) (S82 138X)   2  Denied: History of concussion   3  Denied: History of seizure   4  History of Missed menses (626 4) (N92 6)   5  History of Mononucleosis (075) (B27 90)    Allergies    1  Codeine Derivatives   2  Reglan TABS    Current Meds   1  B-12 1000 MCG Oral Capsule; Therapy: 78Tbu2456 to Recorded   2  Folic Acid 1 MG Oral Tablet; Therapy: 25Hav3929 to Recorded   3  Gabapentin 300 MG Oral Capsule; tid; Therapy: 26Mff8609 to Recorded   4  HydrOXYzine HCl - 50 MG Oral Tablet; 1 tab q6 hr PRN anxiety; Therapy: 65Lsx5323 to Recorded   5  PARoxetine HCl - 30 MG Oral Tablet; TAKE 1 TABLET DAILY AS DIRECTED; Therapy: 14Prc3494 to (Evaluate:86Mki0665) Recorded   6  RisperDAL 1 MG Oral Tablet; Therapy: (Recorded:65Uym1515) to Recorded   7  TraZODone HCl - 50 MG Oral Tablet; 1/2 - 1 tab nightly as needed for insomnia; Therapy: 49Axz7133 to Recorded    Family Psych History  Mother    1  Family history of borderline personality disorder (V17 0) (Z81 8)   2  Family history of substance abuse (V17 0) (Z81 4)  Father    3  Family history of EtOH dependence   4  Family history of psychosis (V17 0) (Z81 8)  Family History    5  Family history of Anemia   6  Family history of Anxiety   7   Family history of Arthritis   8  Family history of Depression   9  Family history of Diabetes   10  Family history of Heart disease   11  Family history of Hypertension, benign   12  Family history of Neurologic disorder   15  Family history of Osteoporosis   14  Family history of Pulmonary disease    Social History    · Caffeine use (V49 89) (F15 90)   · Exercise frequency (times/week)   · History of marijuana use (305 23) (Q92 071)   · Sexually active   · Single   · Student   · Tobacco use (305 1) (Z72 0)    Future Appointments    Date/Time Provider Specialty Site   09/20/2017 12:15 PM BRE Guerra  Psychiatry [facility]   09/21/2017 10:00 AM BRE Guerra  Psychiatry [facility] PARTIAL HOSPITALIZATION   09/22/2017 10:00 AM BRE Guerra   Psychiatry [facility] PARTIAL HOSPITALIZATION     Signatures   Electronically signed by : Greg Camp RN; Sep 19 2017  8:36AM EST                       (Author)

## 2018-01-12 NOTE — PSYCH
History of Present Illness  [facility] Clinical Progress Note [facility]:   Specialized Services Documentation - Therapist must complete separate progress note for each specific clinical activity in which the client participated during the day  Case Management Note:   56 Yeimy no called and no showed again today for Program  This CM reached Yeimy at this time on her cell phone  She stated that she would not be returning to Program as it was not helpful for her and she was looking at the clock all day waiting for Program to be over  This CM asked if Derrick Mclean was willing to attend Program on Monday to complete a formal discharge and she declined  She understands that Program Psychiatrist cannot prescribe medications if she is not attending Program and she stated her PCP would prescribe medications for her  Derrick Mclean declined case management assistance to set up appointments with local [facility] to see OP psychiatrist and therapy there  Per her request, she was given contact information to set up appointments at [facility] in [address] [facility]  Derrick Mclean will be discharged per her request      Medications not changed/added/denied  Jessica Glass RN      Active Problems    1  ANA (generalized anxiety disorder) (300 02) (F41 1)   2  Insomnia due to mental disorder (300 9,327 02) (F51 05)   3  Recurrent major depressive disorder, in partial remission (296 35) (F33 41)    Past Medical History    1  History of Fracture, ankle (824 8) (S82 591J)   2  Denied: History of concussion   3  Denied: History of seizure   4  History of Missed menses (626 4) (N92 6)   5  History of Mononucleosis (075) (B27 90)    Allergies    1  Codeine Derivatives   2  Reglan TABS    Current Meds   1  B-12 1000 MCG Oral Capsule; Therapy: 19Lll6325 to Recorded   2  Folic Acid 1 MG Oral Tablet; Therapy: 97Wmb9762 to Recorded   3  Gabapentin 300 MG Oral Capsule; tid; Therapy: 21Bhu4518 to Recorded   4   HydrOXYzine HCl - 50 MG Oral Tablet; 1 tab q6 hr PRN anxiety; Therapy: 26Ymm3722 to Recorded   5  PARoxetine HCl - 30 MG Oral Tablet; TAKE 1 TABLET DAILY AS DIRECTED; Therapy: 15Sep2017 to (Evaluate:14Nov2017) Recorded   6  RisperDAL 1 MG Oral Tablet; Therapy: (Recorded:15Sep2017) to Recorded   7  TraZODone HCl - 50 MG Oral Tablet; 1/2 - 1 tab nightly as needed for insomnia; Therapy: 15Sep2017 to Recorded    Family Psych History  Mother    1  Family history of borderline personality disorder (V17 0) (Z81 8)   2  Family history of substance abuse (V17 0) (Z81 4)  Father    3  Family history of EtOH dependence   4  Family history of psychosis (V17 0) (Z81 8)  Family History    5  Family history of Anemia   6  Family history of Anxiety   7  Family history of Arthritis   8  Family history of Depression   9  Family history of Diabetes   10  Family history of Heart disease   11  Family history of Hypertension, benign   12  Family history of Neurologic disorder   15  Family history of Osteoporosis   14   Family history of Pulmonary disease    Social History    · Caffeine use (V49 89) (F15 90)   · Exercise frequency (times/week)   · History of marijuana use (305 23) (M68 319)   · Sexually active   · Single   · Student   · Tobacco use (305 1) (Z72 0)    Signatures   Electronically signed by : Edna Carrasquillo RN; Sep 22 2017  1:13PM EST                       (Author)

## 2018-01-12 NOTE — DISCHARGE SUMMARY
Discharge Summary  [facility] Discharge Summary [facility]:   Admission Date: 9/15/17  Patient was referred by Dr Ana Cristina Aldana  Discharge Date: 9/25/17  the patient chose to be discharged AMA  Diagnosis: Axis I: F41 1, F33 41  Treating Physician: Dr Chel Ennis MD    Treatment Complications: Ms Edin Ragland was discharged AMA after she began no call/no show after third treatment day  She minimized alcohol abuse in co-occurring group and with this CM  Presenting Problem: Ms Edin Ragland was referred to [facility] after inpatient hospitalization at [facility] for suicide attempt by overdose  She was admitted 9/6/17 and discharged 9/14/17  Per Dr Nancy Contreras' Psychiatric Evaluation  Patient does not have any suicidal or homicidal ideation and tolerating medications well  Patient seems motivated and engaged  Has good support system  Does have a history of drinking and marijuana although both of them have not been done since discharge from the hospital  Has not needed her hydroxyzine and only takes trazodone as needed  Stressors include losing her uncle recently, anger history, drinking history which is now not a problem presently, self-harm tendencies such as vomiting and scraping a knife against herself but again these have not been since hospitalization, losing her grandmother 3 years ago, and interpersonal relationships  Course of treatment includes group counseling, pharmacotherapy, individual case management, allied therapy, psychoeducation, psychiatric evaluation, family counseling and dual diagnosis counseling  Treatment Progress: Ms Edin Ragland attended only three treatment days at [facility] then she began to no call and no show  This CM/RN reached out to her attempting to contact her but she did not answer her cell phone   This CM was able to contact her mother (emergency contact MAGDALENA signed ) She stated that Yulissa Alexander was working with her at a [facility] and that mother felt she was on "too many medications " Mother stated that she herself had been put on many medications and they were not helpful for her  This CM/RN then spoke with Ms Eber Godfrey who stated that she was safe and denied SI, HI as well as psychotic and manic symptoms  She stated that she preferred to work with the dogs at [employer] and had left her job as a  in an attempt to remain sober from alcohol  Ms Eber Godfrey reported compliance with medication and medication was not changed while she was at [facility] as she stated to Dr Savannah Cardona that she felt she was "on the right meds for me " She stated that she had several close friends she could speak with and that her mother and stepfather were both very supportive of her  Aftercare recommendations include  Ms Eber Godfrey stated that she will follow up with her PCP Dr Channing Turner  She was given several referrals for OP therapy including [facility]  and twelve step AA attendance as well as depression/DBSA support groups encouraged  She stated she would arrange and attend same if she needed  Discharge Medications include: See medication list       Active Problems    1  ANA (generalized anxiety disorder) (300 02) (F41 1)   2  Insomnia due to mental disorder (300 9,327 02) (F51 05)   3  Recurrent major depressive disorder, in partial remission (296 35) (F33 41)    Past Medical History    1  History of Fracture, ankle (824 8) (S82 899A)   2  Denied: History of concussion   3  Denied: History of seizure   4  History of Missed menses (626 4) (N92 6)   5  History of Mononucleosis (075) (B27 90)    Social History    · Caffeine use (V49 89) (F15 90)   · Exercise frequency (times/week)   · History of marijuana use (305 23) (U43 168)   · Sexually active   · Single   · Student   · Tobacco use (305 1) (Z72 0)    Current Meds   1  B-12 1000 MCG Oral Capsule; Therapy: 37Pkq2627 to Recorded   2  Folic Acid 1 MG Oral Tablet; Therapy: 45Odz4668 to Recorded   3   Gabapentin 300 MG Oral Capsule; tid; Therapy: 15Sep2017 to Recorded   4  HydrOXYzine HCl - 50 MG Oral Tablet; 1 tab q6 hr PRN anxiety; Therapy: 15Sep2017 to Recorded   5  PARoxetine HCl - 30 MG Oral Tablet; TAKE 1 TABLET DAILY AS DIRECTED; Therapy: 15Sep2017 to (Evaluate:14Nov2017) Recorded   6  RisperDAL 1 MG Oral Tablet; Therapy: (Recorded:15Sep2017) to Recorded   7  TraZODone HCl - 50 MG Oral Tablet; 1/2 - 1 tab nightly as needed for insomnia; Therapy: 15Sep2017 to Recorded    Allergies    1  Codeine Derivatives   2   Reglan TABS    Signatures   Electronically signed by : Neel Gillespie RN; Oct  3 2017 10:19AM EST                       (Author)    Electronically signed by : Neel Gillespie RN; Oct  3 2017 10:34AM EST                       (Author)    Electronically signed by : BRE Johnson ; Oct  3 2017 12:46PM EST                       (Author)

## 2018-01-13 NOTE — PSYCH
History of Present Illness  [facility] Clinical Progress Note [facility]:   Specialized Services Documentation - Therapist must complete separate progress note for each specific clinical activity in which the client participated during the day  (095) Group Psychotherapy: (9:30-10:30) Carlos Eduardo Gregg was excused from psychotherapy group due to evaluation with psychiatrist  LONI Casillas     (188) Group Psychotherapy: [id number] Carlos Eduardo Gregg participated in weekly wellness group to discuss what particular area of wellness that has been worked on up to today in Program and choice of area to continue working on for recovery as targeted in treatment plan, by choice or in WRAP  Carlos Eduardo Gregg shared that the time she spent recently inpatient was helpful for her  She realized that she received a lot of "good help" in being inpatient and having time to think about her life and her goals  She stated that she wants to maintain sobriety and work toward eventually "doing some type of work that helps others" as this is rewarding for her  She offered that she realizes she needs to concentrate on her own wellness first before she can help others  She will work on emotions  Carlos Eduardo Gregg made good beginning progress toward goal  Continue to offer weekly wellness as an strategy to offer opportunity to focus on goals and identify areas of goal achievement and need  Treatment Plan Problem(s): 1 1,1 2,1 4  Glorine Gaucher, RN       (251) Education Therapy Goals set - Stay positive    Treatment Plan Problem(s): 1 1, 1 2  Education Therapy Time - 0900 - 0930 (First treatment day)   Readiness to Learning:  She is receptive to learning  There are  no barriers to learning  Learning Assessment Time - 1330 - 1400   Education completed on  illness, medication and wellness tools  The teaching method was  verbal  Shared area of learning: Yes  Sameer Contreras MTI   Ernesta Hamman, MT-BC     (632) Allied Therapy [id number] Carlos Eduardo Gregg actively shared in [facility] group focused on DBT module of Emotion Regulation  She engaged in jacques analysis and song-writing task  Group discussed difference between feelings, emotions, and moods, as well as the prompting events and expressions/actions of them  Group explored the emotions of sadness and happiness  She was given hand-outs on topic  Margueritedom Mendoza took a leadership role of writing for her group  Marguerite Mendoza shared that rejection makes her sad and volunteering makes her happy  Some small initial progress towards goal observed and shared  Continue AT to further encourage the understanding of wellness tool to promote recovery  GENESIS Lewis  Treatment Plan Problem(s): 1 1, 1 2  MORTEZA Giron     ( ) Other Pre-certified with insurance, [employer] while IP by John Nelson with [employer] and Auth #[id number] with 10 days authorized from 9/15-9/28/17  Marguerite Halinagael is aware of same  Lavell Patel RN     Case Management Note:   [id number] Marguerite Mendoza met with this CM to complete initial evaluation and sign ROIs  She was given a crisis card that was explained to her  She denied SI and HI  She stated that she has been taking medication as ordered and denied any side effects  Yeimy denied drinking or smoking marijuana as well as taking any illegal drugs since discharge from [address]  TREATMENT SESSION NUMBER: 1   Current suicide risk is low  Medications not changed/added/denied  Lavell Patel RN      Past Medical History    1  History of Fracture, ankle (824 8) (S82 620T)   2  History of Mononucleosis (075) (B27 90)    Current Meds   1  B-12 1000 MCG Oral Capsule; Therapy: 15Sep2017 to Recorded   2  Folic Acid 1 MG Oral Tablet; Therapy: 15Sep2017 to Recorded   3  Gabapentin 300 MG Oral Capsule; tid; Therapy: 15Sep2017 to Recorded   4  HydrOXYzine HCl - 50 MG Oral Tablet; 1 tab q6 hr PRN anxiety; Therapy: 15Sep2017 to Recorded   5  PARoxetine HCl - 30 MG Oral Tablet; TAKE 1 TABLET DAILY AS DIRECTED;    Therapy: 15Sep2017 to (Evaluate:[id number]) Recorded   6  RisperDAL 1 MG Oral Tablet; Therapy: (Recorded:87Nbq1136) to Recorded   7  TraZODone HCl - 50 MG Oral Tablet; 1/2 - 1 tab nightly as needed for insomnia; Therapy: 28Nas0236 to Recorded    Family Psych History  Family History    1  Family history of Anemia   2  Family history of Anxiety   3  Family history of Arthritis   4  Family history of Depression   5  Family history of Diabetes   6  Family history of Heart disease   7  Family history of Hypertension, benign   8  Family history of Neurologic disorder   9  Family history of Osteoporosis   10  Family history of Pulmonary disease    Social History    · Exercise frequency (times/week)   · Sexually active   · Single   · Student    Assessment    1  History of Oral Surgery Tooth Extraction [place] Tooth   2  Family history of borderline personality disorder (V17 0) (Z81 8) : Mother   3  Family history of psychosis (V17 0) (Z81 8) : Father   4  Family history of EtOH dependence : Father   5  Family history of substance abuse (V17 0) (Z81 4) : Mother   6  Tobacco use (305 1) (Z72 0)   7  Caffeine use (V49 89) (F15 90)   8  History of marijuana use (305 23) (Z87 898)   9  ANA (generalized anxiety disorder) (300 02) (F41 1)   10  Recurrent major depressive disorder, in partial remission (296 35) (F33 41)   11  Insomnia due to mental disorder (300 9,327 02) (F51 05)    Future Appointments    Date/Time Provider Specialty Site   09/20/2017 12:15 PM BRE Blankenship  Psychiatry [facility]   09/21/2017 10:00 AM BRE Blankenship  Psychiatry [facility]   09/22/2017 10:00 AM BRE Blankenship   Psychiatry [facility]   09/18/2017 10:00 AM Myrtle Quintana DO Psychiatry [facility]   09/19/2017 10:00 AM Myrtle Quintana DO Psychiatry [facility]     Signatures   Electronically signed by : ISAIAS Begum; Sep 15 2017 10:41AM EST                       (Author)    Electronically signed by : GENESIS Beckman; Sep 15 2017  2:54PM EST                       (Author)    Electronically signed by : Sulma Blunt RN; Sep 15 2017  3:01PM EST                       (Author)    Electronically signed by : MORTEZA Nowak; Sep 15 2017  3:15PM EST                       (Author)    Electronically signed by : Sulma Blunt RN; Sep 18 2017  2:49PM EST                       (Author)

## 2018-01-13 NOTE — PSYCH
History of Present Illness  [facility] Clinical Progress Note  [facility]:   Specialized Services Documentation - Therapist must complete separate progress note for each specific clinical activity in which the client participated during the day  (915) Group Psychotherapy: (9:30-10:30) Yeimy participated in psychotherapy group focused on physical and mental effects of anxiety, and ways to cope with those effects  Gina Caraballo identified trying to control her anxiety as a stressor  She actively engaged in group discussion, talking about how her anxiety has caused some concentration and focus difficulties  She stated she sometimes has trouble remembering things that she wants to get done, so she writes out a to-do list  She does not always need to refer back to the list as just writing it out helps her  Group discussed the difficulties that mental illness can cause and multiple ways to adapt to those difficulties in order to be able to function  Moderate progress toward goals today  Continue psychotherapy group to encourage Gina Caraballo to explore life stressors and healthy ways of coping  Treatment Plan Problem(s): 1 1, 1 2  Tonia Madrid MSW, LSW     (087) Group Psychotherapy: (12:30-1:30 pm) Co-occurring disorder group  Yeimy participated in group  Topics were sharing past histories of their addiction and where it led to and what AA meetings offer  She was new to the group  She shared her history of mental health and drugs and alcohol  Last reported use was 16 days ago  Appears to have some insight but appears to be in denial and overconfident  Denial and overconfidence appear to be a symptoms of lack of knowledge of addiction  No progress on goal  Continue with group  Treatment Plan Problem(s): 2 1  ADILIA ManciaW       (919) Education Therapy Goals set - Work on coping skills    Treatment Plan Problem(s): 1 1, 1 2  Education Therapy Time - 0900 - 0930 Previous goal was not met  Readiness to Learning:   She is receptive to learning  There are  no barriers to learning  Learning Assessment Time - 1330 - 1400   Education completed on  illness and wellness tools  The teaching method was  verbal and demonstration  Shared area of learning: Yes  Annetta Cowden, MTI Thedore Reeks, MT-BC     (433) Allied Therapy [id number] Yeimy moderately shared in [facility] group focused on relaxation techniques and DBT skill of self-soothing for distress tolerance  She engaged in therapist led relaxation exercises and exploration of self-soothing kit  Group explored diaphragmatic breathing, progressive muscle relaxation, breath counting, and visualization  Group discussed using five senses to self-soothe, and items that can do that being put into a self-soothe kit  She was given hand-outs on topic  Yeimy appeared restless at times while looking at the clock repeatedly  Minimal progress towards goal observed  Continue AT to further encourage the use of wellness tool to promote recovery  Annetta Cowden, MTI  Treatment Plan Problem(s): 1 1, 1 2  MORTEZA William       Case Management Note: Individual Case Management visit not provided today  TREATMENT SESSION NUMBER: 3   Current suicide risk is low  Medications not changed/added/denied  Kassandra Matamoros, RADHA      Active Problems    1  ANA (generalized anxiety disorder) (300 02) (F41 1)   2  Insomnia due to mental disorder (300 9,327 02) (F51 05)   3  Recurrent major depressive disorder, in partial remission (296 35) (F33 41)    Past Medical History    1  History of Fracture, ankle (824 8) (S82 829A)   2  Denied: History of concussion   3  Denied: History of seizure   4  History of Missed menses (626 4) (N92 6)   5  History of Mononucleosis (075) (B27 90)    Allergies    1  Codeine Derivatives   2  Reglan TABS    Current Meds   1  B-12 1000 MCG Oral Capsule; Therapy: [id number] to Recorded   2  Folic Acid 1 MG Oral Tablet; Therapy: 74Opi1861 to Recorded   3   Gabapentin 300 MG Oral Capsule; tid; Therapy: 93Ehv1962 to Recorded   4  HydrOXYzine HCl - 50 MG Oral Tablet; 1 tab q6 hr PRN anxiety; Therapy: 67Toa4898 to Recorded   5  PARoxetine HCl - 30 MG Oral Tablet; TAKE 1 TABLET DAILY AS DIRECTED; Therapy: 79Oby1725 to (Evaluate:79Cdz7767) Recorded   6  RisperDAL 1 MG Oral Tablet; Therapy: (Recorded:19Jja4267) to Recorded   7  TraZODone HCl - 50 MG Oral Tablet; 1/2 - 1 tab nightly as needed for insomnia; Therapy: 65Ukm6316 to Recorded    Family Psych History  Mother    1  Family history of borderline personality disorder (V17 0) (Z81 8)   2  Family history of substance abuse (V17 0) (Z81 4)  Father    3  Family history of EtOH dependence   4  Family history of psychosis (V17 0) (Z81 8)  Family History    5  Family history of Anemia   6  Family history of Anxiety   7  Family history of Arthritis   8  Family history of Depression   9  Family history of Diabetes   10  Family history of Heart disease   11  Family history of Hypertension, benign   12  Family history of Neurologic disorder   15  Family history of Osteoporosis   14  Family history of Pulmonary disease    Social History    · Caffeine use (V49 89) (F15 90)   · Exercise frequency (times/week)   · History of marijuana use (305 23) (F74 149)   · Sexually active   · Single   · Student   · Tobacco use (305 1) (Z72 0)    Future Appointments    Date/Time Provider Specialty Site   09/21/2017 10:00 AM BRE Crawford  Psychiatry [facility]   09/22/2017 10:00 AM BRE Crawford   Psychiatry [facility]     Signatures   Electronically signed by : ISAIAS Mckay; Sep 20 2017 12:53PM EST                       (Author)    Electronically signed by : Tres Zeng LCSW; Sep 20 2017  2:08PM EST                       (Author)    Electronically signed by : Annetta Cowden, MTI; Sep 20 2017  2:44PM EST                       (Author)    Electronically signed by : Laureano Austin MT-BC; Sep 20 2017  2:53PM EST                       (Author)    Electronically signed by : Valorie Urbano RN; Oct  3 2017 10:00AM EST                       (Author)

## 2018-01-14 VITALS
HEART RATE: 70 BPM | TEMPERATURE: 97.6 F | RESPIRATION RATE: 18 BRPM | WEIGHT: 121 LBS | SYSTOLIC BLOOD PRESSURE: 99 MMHG | BODY MASS INDEX: 21.44 KG/M2 | HEIGHT: 63 IN | DIASTOLIC BLOOD PRESSURE: 66 MMHG
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
## 2018-01-16 NOTE — PSYCH
History of Present Illness  [facility] Clinical Progress Note  ke:   Specialized Services Documentation - Therapist must complete separate progress note for each specific clinical activity in which the client participated during the day  Case Management Note:   0930 Lui Sebastian is no call and no show today  This CM could not reach her on her contact cell phone so emergency contact was called  This CM spoke with Yeimy's mother (MAGDALENA signed)  She stated that Lui Sebastian will not be returning to Program as she was hoping to "just get an OP therapist and OP psychiatrist" at [address] [place] [address]  This CM had discussed with Yeimy yesterday arranging OP providers outside of [facility] since no appointments are available without a long wait  Lui Sebastian was in agreement with this plan  Discussing appointment at [facility] was in agreement  This CM requested to mother that Lui Sebastian call this CM directly to discuss her concerns and to inform whether she does want her case closed AMA  1600 No call received from Lui Sebastian  Medications not changed/added/denied  Garett Krishna RN      Active Problems    1  ANA (generalized anxiety disorder) (300 02) (F41 1)   2  Insomnia due to mental disorder (300 9,327 02) (F51 05)   3  Recurrent major depressive disorder, in partial remission (296 35) (F33 41)    Past Medical History    1  History of Fracture, ankle (824 8) (S82 069A)   2  Denied: History of concussion   3  Denied: History of seizure   4  History of Missed menses (626 4) (N92 6)   5  History of Mononucleosis (075) (B27 90)    Allergies    1  Codeine Derivatives   2  Reglan TABS    Current Meds   1  B-12 1000 MCG Oral Capsule; Therapy: [id number] to Recorded   2  Folic Acid 1 MG Oral Tablet; Therapy: [id number] to Recorded   3  Gabapentin 300 MG Oral Capsule; tid; Therapy: [id number] to Recorded   4  HydrOXYzine HCl - 50 MG Oral Tablet; 1 tab q6 hr PRN anxiety;    Therapy: [id number] to Recorded 5  PARoxetine HCl - 30 MG Oral Tablet; TAKE 1 TABLET DAILY AS DIRECTED; Therapy: [id number] to (Evaluate:[id number]) Recorded   6  RisperDAL 1 MG Oral Tablet; Therapy: (Recorded:27Ypv2647) to Recorded   7  TraZODone HCl - 50 MG Oral Tablet; 1/2 - 1 tab nightly as needed for insomnia; Therapy: [id number] to Recorded    Family Psych History  Mother    1  Family history of borderline personality disorder (V17 0) (Z81 8)   2  Family history of substance abuse (V17 0) (Z81 4)  Father    3  Family history of EtOH dependence   4  Family history of psychosis (V17 0) (Z81 8)  Family History    5  Family history of Anemia   6  Family history of Anxiety   7  Family history of Arthritis   8  Family history of Depression   9  Family history of Diabetes   10  Family history of Heart disease   11  Family history of Hypertension, benign   12  Family history of Neurologic disorder   15  Family history of Osteoporosis   14  Family history of Pulmonary disease    Social History    · Caffeine use (V49 89) (F15 90)   · Exercise frequency (times/week)   · History of marijuana use (305 23) (Y34 112)   · Sexually active   · Single   · Student   · Tobacco use (305 1) (Z72 0)    Future Appointments    Date/Time Provider Specialty Site   09/22/2017 10:00 AM BRE Koch   [facility]     Signatures   Electronically signed by : Benjamin Dudley RN; Sep 21 2017  3:41PM EST                       (Author)    Electronically signed by : Benjamin Dudley RN; Sep 21 2017  3:44PM EST                       (Author)
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
## 2018-01-24 ENCOUNTER — TRANSCRIBE ORDERS (OUTPATIENT)
Dept: LAB | Facility: HOSPITAL | Age: 23
End: 2018-01-24

## 2018-01-24 ENCOUNTER — APPOINTMENT (OUTPATIENT)
Dept: LAB | Facility: HOSPITAL | Age: 23
End: 2018-01-24
Attending: OBSTETRICS & GYNECOLOGY
Payer: COMMERCIAL

## 2018-01-24 DIAGNOSIS — N91.4 SECONDARY OLIGOMENORRHEA: Primary | ICD-10-CM

## 2018-01-24 LAB
B-HCG SERPL-ACNC: <2 MIU/ML
FSH SERPL-ACNC: 5.9 MIU/ML
LH SERPL-ACNC: 32.6 MIU/ML
PROGEST SERPL-MCNC: 1.6 NG/ML
PROLACTIN SERPL-MCNC: 9.5 NG/ML
T4 FREE SERPL-MCNC: 0.98 NG/DL (ref 0.76–1.46)
TSH SERPL DL<=0.05 MIU/L-ACNC: 1.18 UIU/ML (ref 0.36–3.74)

## 2018-01-24 PROCEDURE — 84702 CHORIONIC GONADOTROPIN TEST: CPT

## 2018-01-24 PROCEDURE — 84439 ASSAY OF FREE THYROXINE: CPT

## 2018-01-24 PROCEDURE — 84146 ASSAY OF PROLACTIN: CPT

## 2018-01-24 PROCEDURE — 36415 COLL VENOUS BLD VENIPUNCTURE: CPT

## 2018-01-24 PROCEDURE — 84402 ASSAY OF FREE TESTOSTERONE: CPT

## 2018-01-24 PROCEDURE — 84144 ASSAY OF PROGESTERONE: CPT

## 2018-01-24 PROCEDURE — 84403 ASSAY OF TOTAL TESTOSTERONE: CPT

## 2018-01-24 PROCEDURE — 83001 ASSAY OF GONADOTROPIN (FSH): CPT

## 2018-01-24 PROCEDURE — 84443 ASSAY THYROID STIM HORMONE: CPT

## 2018-01-24 PROCEDURE — 83002 ASSAY OF GONADOTROPIN (LH): CPT

## 2018-01-25 LAB
TESTOST FREE SERPL-MCNC: 3.2 PG/ML (ref 0–4.2)
TESTOST SERPL-MCNC: 51 NG/DL (ref 8–48)

## 2018-03-07 NOTE — PSYCH
History of Present Illness    Presenting Problems: Stressors: PATIENT ATTEMPTED SUICIDE BY OD DUE TO FAMILY AND BOYFRIEND STRESSORS POOR SLEEP AND INCREASE ALCOHOL USE  Referral Source: [address] [place] [address]  [facility]  She is employed  at [employer]????  She is a smoker  Symptoms: suicidal ideation, recent attempt: OD ON MEDS, no self abusive behaviors, no homicidal thoughts, no history of violence toward others, depressed mood, anxiety, no psychosis, no medication noncompliance, sleep disturbances, change in appetite, no agitation and no hypomania  Provisional Diagnosis: Axis I: SEVERE MDD RECURRENT W/OYCHOTIC FEATURES  Substance Abuse: alcohol  ACCEPTED  Appointment Date: 09/15/2017        Signatures   Electronically signed by : Dhara Narayanan, ; Sep 12 2017 11:50AM EST                       (Author)

## 2018-12-02 ENCOUNTER — HOSPITAL ENCOUNTER (EMERGENCY)
Facility: HOSPITAL | Age: 23
Discharge: HOME | End: 2018-12-02
Attending: EMERGENCY MEDICINE

## 2018-12-02 VITALS
DIASTOLIC BLOOD PRESSURE: 56 MMHG | SYSTOLIC BLOOD PRESSURE: 114 MMHG | TEMPERATURE: 98.2 F | RESPIRATION RATE: 16 BRPM | BODY MASS INDEX: 20.38 KG/M2 | HEIGHT: 63 IN | HEART RATE: 97 BPM | WEIGHT: 115 LBS | OXYGEN SATURATION: 99 %

## 2018-12-02 DIAGNOSIS — R09.81 NASAL CONGESTION: ICD-10-CM

## 2018-12-02 DIAGNOSIS — J03.90 TONSILLITIS WITH EXUDATE: Primary | ICD-10-CM

## 2018-12-02 PROCEDURE — 99282 EMERGENCY DEPT VISIT SF MDM: CPT

## 2018-12-02 RX ORDER — AZITHROMYCIN 250 MG/1
TABLET, FILM COATED ORAL
Qty: 6 TABLET | Refills: 0 | Status: SHIPPED | OUTPATIENT
Start: 2018-12-02 | End: 2018-12-06

## 2018-12-02 NOTE — ED PROVIDER NOTES
History  Chief Complaint   Patient presents with    Sore Throat     pt c/o sore throat for 4 days     21year old female [facility] anxiety and depression presents today complaining of 3 days of nasal congestion, dry cough, fever and sore throat  Has tried dayquil without relief  Has not had difficulty swallowing but admits to pain on swallowing  Denies drooling or difficulty breathing  No chest pain, shortness of breath, abdominal pain, nausea, vomiting, diarrhea, rash  Denies sick contacts  Reports PCN allergy and has taken azithromycin in the past for strep  Prior to Admission Medications   Prescriptions Last Dose Informant Patient Reported? Taking? ARIPiprazole (ABILIFY) 10 mg tablet   Yes No   Sig: Take 10 mg by mouth daily   busPIRone (BUSPAR) 30 MG tablet   Yes No   Sig: Take 30 mg by mouth 2 (two) times a day   gabapentin (NEURONTIN) 300 mg capsule   No No   Sig: Take 1 capsule by mouth 3 (three) times a day At 9AM, 4PM, 9PM   Patient taking differently: Take 1,600 mg by mouth daily At 9AM, 4PM, 9PM    mirtazapine (REMERON) 30 mg tablet   Yes No   Sig: Take 15 mg by mouth daily at bedtime   traZODone (DESYREL) 50 mg tablet   No No   Sig: Take 1 tablet by mouth daily at bedtime as needed for sleep      Facility-Administered Medications: None       Past Medical History:   Diagnosis Date    Anxiety     Depression        History reviewed  No pertinent surgical history  Family History   Problem Relation Age of Onset    Bipolar disorder Mother     Drug abuse Mother     Alcohol abuse Mother      I have reviewed and agree with the history as documented  Social History   Substance Use Topics    Smoking status: Current Every Day Smoker     Packs/day: 0 20     Types: Cigarettes    Smokeless tobacco: Never Used    Alcohol use Yes      Comment: occasional        Review of Systems   Constitutional: Positive for fever  HENT: Positive for congestion and sore throat      Respiratory: Positive for cough  All other systems reviewed and are negative  Physical Exam  Physical Exam   Constitutional: She appears well-developed and well-nourished  No distress  HENT:   Head: Normocephalic and atraumatic  Mouth/Throat: Uvula is midline, oropharynx is clear and moist and mucous membranes are normal  No posterior oropharyngeal erythema  Tonsils are 1+ on the right  Tonsils are 1+ on the left  Tonsillar exudate  Eyes: Conjunctivae are normal    Cardiovascular: Normal rate and regular rhythm  Pulmonary/Chest: Effort normal and breath sounds normal  No respiratory distress  She has no wheezes  Abdominal: Soft  She exhibits no distension  There is no tenderness  There is no guarding  Neurological: She is alert  Skin: Skin is warm and dry  Capillary refill takes less than 2 seconds  She is not diaphoretic  Psychiatric: She has a normal mood and affect  Vital Signs  ED Triage Vitals [12/02/18 1501]   Temperature Pulse Respirations Blood Pressure SpO2   98 2 °F (36 8 °C) 97 16 114/56 99 %      Temp Source Heart Rate Source Patient Position - Orthostatic VS BP Location FiO2 (%)   Oral Monitor Sitting Right arm --      Pain Score       7           Vitals:    12/02/18 1501   BP: 114/56   Pulse: 97   Patient Position - Orthostatic VS: Sitting       Visual Acuity      ED Medications  Medications - No data to display    Diagnostic Studies  Results Reviewed     None                 No orders to display              Procedures  Procedures       Phone Contacts  ED Phone Contact    ED Course                               MDM  CritCare Time    Disposition  Final diagnoses:    Tonsillitis with exudate   Nasal congestion     Time reflects when diagnosis was documented in both MDM as applicable and the Disposition within this note     Time User Action Codes Description Comment    12/2/2018  3:36 PM Mechelle Pittman Add [J03 57,  B96 89] Tonsillitis due to gram-negative bacteria     12/2/2018  3:36 PM Peter Monique Branch [J03 80,  B96 89] Tonsillitis due to gram-negative bacteria     12/2/2018  3:36 PM Anthhemalatha Roldan Add [J03 90] Tonsillitis with exudate     12/2/2018  3:36 PM Kenn Roldan Add [R09 81] Nasal congestion       ED Disposition     ED Disposition Condition Comment    Discharge  [address] discharge to home/self care  Condition at discharge: Good        Follow-up Information     Follow up With Specialties Details Why Contact Info    Wanda Schrader MD Internal Medicine Schedule an appointment as soon as possible for a visit  [address] [address]  [phone]            Patient's Medications   Discharge Prescriptions    AZITHROMYCIN (ZITHROMAX) 250 MG TABLET    Take 2 tablets today then 1 tablet daily x 4 days       Start Date: 12/2/2018 End Date: 12/6/2018       Order Dose: --       Quantity: 6 tablet    Refills: 0     No discharge procedures on file      ED Provider  Electronically Signed by           Mesha Charles PA-C  12/02/18 4320

## 2018-12-02 NOTE — DISCHARGE INSTRUCTIONS
Tonsillitis   WHAT YOU NEED TO KNOW:   Tonsillitis is inflammation of your tonsils  Tonsils are the lumps of tissue on both sides of the back of your throat  Tonsils are part of your immune system  They help you fight infections  Recurrent tonsillitis is when you have tonsillitis many times in 1 year  Chronic tonsillitis is when you have a sore throat that lasts 3 months or longer  DISCHARGE INSTRUCTIONS:   Medicines: You may need any of the following:  · Acetaminophen  decreases pain and fever  It is available without a doctor's order  Ask how much to take and how often to take it  Follow directions  Acetaminophen can cause liver damage if not taken correctly  · NSAIDs , such as ibuprofen, help decrease swelling, pain, and fever  This medicine is available with or without a doctor's order  NSAIDs can cause stomach bleeding or kidney problems in certain people  If you take blood thinner medicine, always ask your healthcare provider if NSAIDs are safe for you  Always read the medicine label and follow directions  · Antibiotics  help treat a bacterial infection  · Take your medicine as directed  Contact your healthcare provider if you think your medicine is not helping or if you have side effects  Tell him or her if you are allergic to any medicine  Keep a list of the medicines, vitamins, and herbs you take  Include the amounts, and when and why you take them  Bring the list or the pill bottles to follow-up visits  Carry your medicine list with you in case of an emergency  Call 911 for the following:   · You have trouble breathing because your tonsils are swollen  Contact your healthcare provider if:   · You have a fever  · Your pain gets worse or does not get better after you take pain medicine  · Your sore throat is not better after you have finished antibiotic treatment  · You have trouble sleeping and wake up trying to catch your breath      · You have questions or concerns about your condition or care  Rest  when you feel it is needed  Slowly start to do more each day  Return to your daily activities as directed  Drink liquids as directed: You may need to drink more liquid than usual to help prevent dehydration  Ask how much liquid to drink each day and which liquids are best for you  Gargle with warm salt water: This may help decrease throat pain  Mix 1 teaspoon of salt in 8 ounces of warm water  Ask how often you should do this  Prevent the spread of germs:  Wash your hands often  Do not share food or drinks with anyone  You may be able to return to work when you feel better and your fever is gone for at least 24 hours  Follow up with your healthcare provider as directed:  Write down your questions so you remember to ask them during your visits  © 2017 [employer] Information is for End User's use only and may not be sold, redistributed or otherwise used for commercial purposes  All illustrations and images included in CareNotes® are the copyrighted property of A D [employer] M [employer] Inc  or Jony Dorsey  The above information is an  only  It is not intended as medical advice for individual conditions or treatments  Talk to your doctor, nurse or pharmacist before following any medical regimen to see if it is safe and effective for you

## 2019-02-20 ENCOUNTER — APPOINTMENT (EMERGENCY)
Dept: RADIOLOGY | Facility: HOSPITAL | Age: 24
End: 2019-02-20

## 2019-02-20 ENCOUNTER — HOSPITAL ENCOUNTER (EMERGENCY)
Facility: HOSPITAL | Age: 24
Discharge: HOME | End: 2019-02-21
Attending: EMERGENCY MEDICINE | Admitting: EMERGENCY MEDICINE

## 2019-02-20 VITALS
WEIGHT: 117 LBS | OXYGEN SATURATION: 98 % | DIASTOLIC BLOOD PRESSURE: 67 MMHG | RESPIRATION RATE: 16 BRPM | SYSTOLIC BLOOD PRESSURE: 135 MMHG | HEART RATE: 93 BPM | TEMPERATURE: 97.1 F | BODY MASS INDEX: 20.73 KG/M2

## 2019-02-20 DIAGNOSIS — S60.00XA FINGER CONTUSION: Primary | ICD-10-CM

## 2019-02-20 DIAGNOSIS — S69.91XA INJURY OF FINGER OF RIGHT HAND, INITIAL ENCOUNTER: ICD-10-CM

## 2019-02-20 PROCEDURE — 99283 EMERGENCY DEPT VISIT LOW MDM: CPT

## 2019-02-20 PROCEDURE — 73130 X-RAY EXAM OF HAND: CPT

## 2019-02-21 NOTE — DISCHARGE INSTRUCTIONS
Elevate the area  Apply ice  Take Motrin or Tylenol as directed on the bottle    Continue to use finger to increase movement follow-up with Orthopedics listed above if symptoms persist beyond 48 hours

## 2019-02-21 NOTE — ED PROVIDER NOTES
History  Chief Complaint   Patient presents with    Finger Injury     per patient, "i slammed my right index finger between 2 kegs at work tonight " swelling noted  Was at work as a   States was lifting keg and Lifting with her left hand; her right hand was underneath and the weight of the keg went over her right hand - 2nd digit  She then flipped the keg backwards and she felt a pinching of that same finger  Finger is swollen most of the pain is around the PIP joint  There are no skin wounds or open injuries  There is no bleeding  There is no skin damage  Sensation is intact  No pain at the wrist forearm or any other digits  No snuffbox tenderness  Patient can fully extend the digit; can fully flex the PIP joint and has flexion of the PIP joint although very slightly limited  Prior to Admission Medications   Prescriptions Last Dose Informant Patient Reported? Taking? ARIPiprazole (ABILIFY) 10 mg tablet   Yes No   Sig: Take 10 mg by mouth daily   busPIRone (BUSPAR) 30 MG tablet   Yes No   Sig: Take 30 mg by mouth 2 (two) times a day   gabapentin (NEURONTIN) 300 mg capsule   No No   Sig: Take 1 capsule by mouth 3 (three) times a day At 9AM, 4PM, 9PM   Patient taking differently: Take 1,600 mg by mouth daily At 9AM, 4PM, 9PM    mirtazapine (REMERON) 30 mg tablet   Yes No   Sig: Take 15 mg by mouth daily at bedtime   traZODone (DESYREL) 50 mg tablet   No No   Sig: Take 1 tablet by mouth daily at bedtime as needed for sleep      Facility-Administered Medications: None       Past Medical History:   Diagnosis Date    Anxiety     Depression     Polycystic ovarian disease        History reviewed  No pertinent surgical history  Family History   Problem Relation Age of Onset    Bipolar disorder Mother     Drug abuse Mother     Alcohol abuse Mother      I have reviewed and agree with the history as documented      Social History     Tobacco Use    Smoking status: Current Every Day Smoker     Packs/day: 0 25     Types: Cigarettes    Smokeless tobacco: Never Used   Substance Use Topics    Alcohol use: Yes     Comment: occasional    Drug use: Not Currently     Types: Marijuana     Comment: daily        Review of Systems   Respiratory: Negative  Cardiovascular: Negative  Gastrointestinal: Negative  Musculoskeletal: Positive for arthralgias and myalgias  Right hand 2nd digit MCP distally   Skin: Negative for color change, pallor, rash and wound  Physical Exam  ED Triage Vitals [02/20/19 2317]   Temperature Pulse Respirations Blood Pressure SpO2   (!) 97 1 °F (36 2 °C) 93 16 135/67 98 %      Temp Source Heart Rate Source Patient Position - Orthostatic VS BP Location FiO2 (%)   Tympanic Monitor Sitting Left arm --      Pain Score       9           Orthostatic Vital Signs  Vitals:    02/20/19 2317   BP: 135/67   Pulse: 93   Patient Position - Orthostatic VS: Sitting       Physical Exam   Constitutional: She is oriented to person, place, and time  She appears well-developed and well-nourished  No distress  Musculoskeletal: She exhibits edema and tenderness  She exhibits no deformity  Right hand 2nd digit  Swelling of the digit  No nail bed injury that is apparent  No open wounds  Fully flex the PIP  She can flex the DIP but slightly decreased  Full extension  No wrist no forearm no palm tenderness   Neurological: She is alert and oriented to person, place, and time  Skin: Skin is warm  Capillary refill takes less than 2 seconds  She is not diaphoretic  ED Medications  Medications - No data to display    Diagnostic Studies  Results Reviewed     None                 XR hand 3+ views RIGHT   ED Interpretation by Merlinda Mackie, DO (02/20 5055)   Wet read no acute findings      Final Result by Len Boo DO (02/21 5456)   Mild soft tissue swelling of the 2nd finger    No acute fracture or radiopaque      Workstation performed: [id number] Procedures  Procedures      Phone Consults  ED Phone Contact    ED Course                               MDM  Number of Diagnoses or Management Options  Finger contusion:   Injury of finger of right hand, initial encounter:   Diagnosis management comments: Normal neurovascular exam   Mild swelling  Likely represents contusion  No acute findings on chest x-ray  Ice elevation symptomatic care  Use as tolerated  Disposition  Final diagnoses:   Finger contusion   Injury of finger of right hand, initial encounter     Time reflects when diagnosis was documented in both MDM as applicable and the Disposition within this note     Time User Action Codes Description Comment    2/20/2019 11:38 PM Thomas Foleyy Add [S60 00XA] Finger contusion     2/20/2019 11:38 PM Thomas Yady Add [S69 91XA] Injury of finger of right hand, initial encounter       ED Disposition     ED Disposition Condition Date/Time Comment    Discharge Good Wed Feb 20, 2019 11:38  [address] discharge to home/self care              Follow-up Information     Follow up With Specialties Details Why Contact Info Additional [address] Emergency Department Emergency Medicine Go to  If symptoms worsen or if loose sensation in the finger or if suddenly becomes more painful [address] [facility] ED, [address], 43 [facility] Orthopedic Surgery Call in 2 days If symptoms persist Ю[address] 10 [address] [facility] 30 [facility], [address], [phone]          Discharge Medication List as of 2/20/2019 11:39 PM      CONTINUE these medications which have NOT CHANGED    Details   ARIPiprazole (ABILIFY) 10 mg tablet Take 10 mg by mouth daily, Historical Med      busPIRone (BUSPAR) 30 MG tablet Take 30 mg by mouth 2 (two) times a day, Historical Med      gabapentin (NEURONTIN) 300 mg capsule Take 1 capsule by mouth 3 (three) times a day At 9AM, 4PM, 9PM, Starting u 9/14/2017, Print      mirtazapine (REMERON) 30 mg tablet Take 15 mg by mouth daily at bedtime, Historical Med      traZODone (DESYREL) 50 mg tablet Take 1 tablet by mouth daily at bedtime as needed for sleep, Starting u 9/14/2017, Print           No discharge procedures on file  ED Provider  Attending physically available and evaluated Pilar Major I managed the patient along with the ED Attending      Electronically Signed by         Chacorta Hurley DO  02/22/19 9836

## 2019-02-21 NOTE — ED ATTENDING ATTESTATION
I, [address] [place] [address], , saw and evaluated the patient  I have discussed the patient with the resident/non-physician practitioner and agree with the resident's/non-physician practitioner's findings, Plan of Care, and MDM as documented in the resident's/non-physician practitioner's note, except where noted  All available labs and Radiology studies were reviewed  At this point I agree with the current assessment done in the Emergency Department  I have conducted an independent evaluation of this patient a history and physical is as follows:    49-year-old female presents with right index finger pain  Patient is a  and was lifting a keg and the keg fell onto her finger  Denies other injury or complaint  On exam-no acute distress, no respiratory distress, swelling and tenderness around the PIP joint of the right index finger, no open injuries, sensation is intact, can flex at the PIP joint and slightly decreased at the DIP joint    Plan-x-ray    Critical Care Time  Procedures
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
## 2019-06-24 ENCOUNTER — HOSPITAL ENCOUNTER (EMERGENCY)
Facility: HOSPITAL | Age: 24
Discharge: HOME | End: 2019-06-24
Attending: EMERGENCY MEDICINE | Admitting: EMERGENCY MEDICINE

## 2019-06-24 VITALS
SYSTOLIC BLOOD PRESSURE: 110 MMHG | HEART RATE: 66 BPM | BODY MASS INDEX: 20.73 KG/M2 | WEIGHT: 117 LBS | OXYGEN SATURATION: 100 % | DIASTOLIC BLOOD PRESSURE: 66 MMHG | TEMPERATURE: 98.2 F | RESPIRATION RATE: 18 BRPM

## 2019-06-24 DIAGNOSIS — R10.2 PELVIC PAIN: ICD-10-CM

## 2019-06-24 DIAGNOSIS — N39.0 UTI (URINARY TRACT INFECTION): Primary | ICD-10-CM

## 2019-06-24 LAB
ANION GAP BLD CALC-SCNC: 17 MMOL/L (ref 4–13)
BACTERIA UR QL AUTO: ABNORMAL /HPF
BILIRUB UR QL STRIP: ABNORMAL
BUN BLD-MCNC: 7 MG/DL (ref 5–25)
CA-I BLD-SCNC: 1.22 MMOL/L (ref 1.12–1.32)
CAOX CRY URNS QL MICRO: ABNORMAL /HPF
CHLORIDE BLD-SCNC: 101 MMOL/L (ref 100–108)
CLARITY UR: ABNORMAL
COLOR UR: ABNORMAL
CREAT BLD-MCNC: 0.8 MG/DL (ref 0.6–1.3)
EXT PREG TEST URINE: NEGATIVE
EXT. CONTROL ED NAV: NORMAL
GFR SERPL CREATININE-BSD FRML MDRD: 104 ML/MIN/1.73SQ M
GLUCOSE SERPL-MCNC: 91 MG/DL (ref 65–140)
GLUCOSE UR STRIP-MCNC: ABNORMAL MG/DL
HCT VFR BLD CALC: 40 % (ref 34.8–46.1)
HGB BLDA-MCNC: 13.6 G/DL (ref 11.5–15.4)
HGB UR QL STRIP.AUTO: ABNORMAL
KETONES UR STRIP-MCNC: ABNORMAL MG/DL
LEUKOCYTE ESTERASE UR QL STRIP: ABNORMAL
NITRITE UR QL STRIP: POSITIVE
NON-SQ EPI CELLS URNS QL MICRO: ABNORMAL /HPF
PCO2 BLD: 26 MMOL/L (ref 21–32)
PH UR STRIP.AUTO: 5 [PH]
POTASSIUM BLD-SCNC: 3.7 MMOL/L (ref 3.5–5.3)
PROT UR STRIP-MCNC: ABNORMAL MG/DL
RBC #/AREA URNS AUTO: ABNORMAL /HPF
SODIUM BLD-SCNC: 139 MMOL/L (ref 136–145)
SP GR UR STRIP.AUTO: 1.03 (ref 1–1.03)
SPECIMEN SOURCE: ABNORMAL
UROBILINOGEN UR QL STRIP.AUTO: 1 E.U./DL
WBC #/AREA URNS AUTO: ABNORMAL /HPF

## 2019-06-24 PROCEDURE — 85014 HEMATOCRIT: CPT

## 2019-06-24 PROCEDURE — 81001 URINALYSIS AUTO W/SCOPE: CPT | Performed by: EMERGENCY MEDICINE

## 2019-06-24 PROCEDURE — 80047 BASIC METABLC PNL IONIZED CA: CPT

## 2019-06-24 PROCEDURE — 96372 THER/PROPH/DIAG INJ SC/IM: CPT

## 2019-06-24 PROCEDURE — 99283 EMERGENCY DEPT VISIT LOW MDM: CPT | Performed by: EMERGENCY MEDICINE

## 2019-06-24 PROCEDURE — 81025 URINE PREGNANCY TEST: CPT | Performed by: EMERGENCY MEDICINE

## 2019-06-24 PROCEDURE — 99284 EMERGENCY DEPT VISIT MOD MDM: CPT

## 2019-06-24 PROCEDURE — 87086 URINE CULTURE/COLONY COUNT: CPT | Performed by: EMERGENCY MEDICINE

## 2019-06-24 RX ORDER — FLUCONAZOLE 150 MG/1
150 TABLET ORAL ONCE
Qty: 1 TABLET | Refills: 0 | Status: SHIPPED | OUTPATIENT
Start: 2019-06-24 | End: 2019-06-24

## 2019-06-24 RX ORDER — KETOROLAC TROMETHAMINE 30 MG/ML
15 INJECTION, SOLUTION INTRAMUSCULAR; INTRAVENOUS ONCE
Status: COMPLETED | OUTPATIENT
Start: 2019-06-24 | End: 2019-06-24

## 2019-06-24 RX ORDER — NITROFURANTOIN 25; 75 MG/1; MG/1
100 CAPSULE ORAL 2 TIMES DAILY
Qty: 10 CAPSULE | Refills: 0 | Status: SHIPPED | OUTPATIENT
Start: 2019-06-24 | End: 2019-06-29

## 2019-06-24 RX ADMIN — KETOROLAC TROMETHAMINE 15 MG: 30 INJECTION, SOLUTION INTRAMUSCULAR at 17:16

## 2019-06-26 LAB — BACTERIA UR CULT: NORMAL

## 2019-10-15 ENCOUNTER — HOSPITAL ENCOUNTER (EMERGENCY)
Facility: HOSPITAL | Age: 24
Discharge: HOME | End: 2019-10-15
Attending: EMERGENCY MEDICINE | Admitting: EMERGENCY MEDICINE

## 2019-10-15 VITALS
TEMPERATURE: 97.5 F | WEIGHT: 112 LBS | DIASTOLIC BLOOD PRESSURE: 76 MMHG | SYSTOLIC BLOOD PRESSURE: 118 MMHG | OXYGEN SATURATION: 96 % | RESPIRATION RATE: 18 BRPM | BODY MASS INDEX: 19.84 KG/M2 | HEART RATE: 99 BPM

## 2019-10-15 DIAGNOSIS — J02.9 ACUTE PHARYNGITIS: Primary | ICD-10-CM

## 2019-10-15 DIAGNOSIS — Z20.2 STD EXPOSURE: ICD-10-CM

## 2019-10-15 LAB — S PYO AG THROAT QL: NEGATIVE

## 2019-10-15 PROCEDURE — 96372 THER/PROPH/DIAG INJ SC/IM: CPT

## 2019-10-15 PROCEDURE — 87491 CHLMYD TRACH DNA AMP PROBE: CPT | Performed by: PHYSICIAN ASSISTANT

## 2019-10-15 PROCEDURE — 87430 STREP A AG IA: CPT | Performed by: PHYSICIAN ASSISTANT

## 2019-10-15 PROCEDURE — 99283 EMERGENCY DEPT VISIT LOW MDM: CPT

## 2019-10-15 PROCEDURE — 99283 EMERGENCY DEPT VISIT LOW MDM: CPT | Performed by: PHYSICIAN ASSISTANT

## 2019-10-15 PROCEDURE — 87591 N.GONORRHOEAE DNA AMP PROB: CPT | Performed by: PHYSICIAN ASSISTANT

## 2019-10-15 RX ORDER — AZITHROMYCIN 250 MG/1
1000 TABLET, FILM COATED ORAL ONCE
Status: COMPLETED | OUTPATIENT
Start: 2019-10-15 | End: 2019-10-15

## 2019-10-15 RX ADMIN — AZITHROMYCIN 1000 MG: 250 TABLET, FILM COATED ORAL at 13:54

## 2019-10-15 RX ADMIN — CEFTRIAXONE SODIUM 250 MG: 250 INJECTION, POWDER, FOR SOLUTION INTRAMUSCULAR; INTRAVENOUS at 13:55

## 2019-10-15 NOTE — ED PROVIDER NOTES
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 Taking? ARIPiprazole (ABILIFY) 10 mg tablet   Yes No   Sig: Take 10 mg by mouth daily   busPIRone (BUSPAR) 30 MG tablet   Yes No   Sig: Take 30 mg by mouth 2 (two) times a day   gabapentin (NEURONTIN) 300 mg capsule   No No   Sig: Take 1 capsule by mouth 3 (three) times a day At 9AM, 4PM, 9PM   Patient taking differently: Take 1,600 mg by mouth daily At 9AM, 4PM, 9PM    mirtazapine (REMERON) 30 mg tablet   Yes No   Sig: Take 15 mg by mouth daily at bedtime   traZODone (DESYREL) 50 mg tablet   No No   Sig: Take 1 tablet by mouth daily at bedtime as needed for sleep      Facility-Administered Medications: None       Past Medical History:   Diagnosis Date    Anxiety     Depression     Polycystic ovarian disease        History reviewed  No pertinent surgical history  Family History   Problem Relation Age of Onset    Bipolar disorder Mother     Drug abuse Mother     Alcohol abuse Mother      I have reviewed and agree with the history as documented      Social History     Tobacco Use    Smoking status: Current Every Day Smoker     Packs/day: 0 25     Types: Cigarettes    Smokeless tobacco: Never Used   Substance Use Topics    Alcohol use: Yes     Comment: occasional    Drug use: Not Currently     Types: Marijuana     Comment: daily        Review of Systems   Constitutional: Negative for chills and fever  HENT: Positive for sore throat  Negative for congestion  Gastrointestinal: Negative for nausea and vomiting  Genitourinary: Negative for decreased urine volume, hematuria, menstrual problem, urgency, vaginal bleeding, vaginal discharge and vaginal pain  Neurological: Negative for weakness and numbness  Physical Exam  Physical Exam   Constitutional: Vital signs are normal  She appears well-developed and well-nourished  No distress  HENT:   Head: Normocephalic and atraumatic  Right Ear: Tympanic membrane, external ear and ear canal normal    Left Ear: Tympanic membrane, external ear and ear canal normal    Mouth/Throat: Uvula is midline and mucous membranes are normal  Posterior oropharyngeal erythema present  Tonsils are 1+ on the right  Tonsils are 1+ on the left  No tonsillar exudate  Neck: Normal range of motion  Neck supple  Cardiovascular: Normal rate and normal heart sounds  Pulmonary/Chest: Effort normal and breath sounds normal    Skin: She is not diaphoretic  Vitals reviewed        Vital Signs  ED Triage Vitals [10/15/19 1302]   Temperature Pulse Respirations Blood Pressure SpO2   97 5 °F (36 4 °C) 99 18 118/76 96 %      Temp Source Heart Rate Source Patient Position - Orthostatic VS BP Location FiO2 (%)   Oral Monitor Sitting Left arm --      Pain Score       3           Vitals:    10/15/19 1302   BP: 118/76   Pulse: 99   Patient Position - Orthostatic VS: Sitting         Visual Acuity      ED Medications  Medications   cefTRIAXone (ROCEPHIN) 250 mg in sterile water IM only syringe (has no administration in time range)   azithromycin [facility]) tablet 1,000 mg (has no administration in time range)       Diagnostic Studies  Results Reviewed     Procedure Component Value Units Date/Time    Chlamydia/GC amplified DNA by PCR [[id number]] Collected:  10/15/19 1336    Lab Status: In process Specimen:  Urine, Other Updated:  10/15/19 1343    Rapid Strep A Screen Only, Adults [[id number]]  (Normal) Collected:  10/15/19 1317    Lab Status:  Final result Specimen:  Throat Updated:  10/15/19 1337     Rapid Strep A Screen Negative                 No orders to display              Procedures  Procedures       ED Course                               MDM  Number of Diagnoses or Management Options  Acute pharyngitis:   STD exposure:   Diagnosis management comments: 26 yo female presents for evaluation of sore throat  Well appearing, vss  Rapid Strep negative  Patient reports that she would like to be treated for gonorrhea and chlamydia despite knowing the results  Advised that she can also follow up with the STD Clinic for further evaluation if she has continued concern  Disposition  Final diagnoses:   Acute pharyngitis   STD exposure     Time reflects when diagnosis was documented in both MDM as applicable and the Disposition within this note     Time User Action Codes Description Comment    10/15/2019  1:47 PM Rupert Fay Add [J02 9] Acute pharyngitis     10/15/2019  1:47 PM Rupert Fay Add [Z20 2] STD exposure       ED Disposition     ED Disposition Condition Date/Time Comment    Discharge Stable Tue Oct 15, 2019  1:47  [address] discharge to home/self care              Follow-up Information     Follow up With Specialties Details Why Contact Info    Radha Hollis MD Internal Medicine Schedule an appointment as soon as possible for a visit in 1 week Follow up for re-check of symptoms [address] [place] [address]  [phone]            Patient's Medications   Discharge Prescriptions    No medications on file     No discharge procedures on file      ED Provider  Electronically Signed by           Nuha Goode PA-C  10/15/19 5584

## 2019-10-17 LAB
C TRACH DNA SPEC QL NAA+PROBE: NEGATIVE
N GONORRHOEA DNA SPEC QL NAA+PROBE: NEGATIVE

## 2020-01-18 ENCOUNTER — HOSPITAL ENCOUNTER (EMERGENCY)
Facility: HOSPITAL | Age: 25
End: 2020-01-20
Attending: EMERGENCY MEDICINE

## 2020-01-18 DIAGNOSIS — F15.10 METHAMPHETAMINE ABUSE (HCC): ICD-10-CM

## 2020-01-18 DIAGNOSIS — F29 PSYCHOSIS (HCC): Primary | ICD-10-CM

## 2020-01-18 PROCEDURE — 96372 THER/PROPH/DIAG INJ SC/IM: CPT

## 2020-01-18 PROCEDURE — 93005 ELECTROCARDIOGRAM TRACING: CPT

## 2020-01-18 PROCEDURE — 82075 ASSAY OF BREATH ETHANOL: CPT | Performed by: EMERGENCY MEDICINE

## 2020-01-18 PROCEDURE — 99285 EMERGENCY DEPT VISIT HI MDM: CPT

## 2020-01-18 PROCEDURE — 80053 COMPREHEN METABOLIC PANEL: CPT | Performed by: EMERGENCY MEDICINE

## 2020-01-18 PROCEDURE — 84443 ASSAY THYROID STIM HORMONE: CPT | Performed by: EMERGENCY MEDICINE

## 2020-01-18 PROCEDURE — 99283 EMERGENCY DEPT VISIT LOW MDM: CPT | Performed by: EMERGENCY MEDICINE

## 2020-01-18 PROCEDURE — 81025 URINE PREGNANCY TEST: CPT | Performed by: EMERGENCY MEDICINE

## 2020-01-18 PROCEDURE — 84703 CHORIONIC GONADOTROPIN ASSAY: CPT | Performed by: EMERGENCY MEDICINE

## 2020-01-18 PROCEDURE — 36415 COLL VENOUS BLD VENIPUNCTURE: CPT | Performed by: EMERGENCY MEDICINE

## 2020-01-18 RX ORDER — MIDAZOLAM HYDROCHLORIDE 1 MG/ML
1 INJECTION INTRAMUSCULAR; INTRAVENOUS ONCE
Status: COMPLETED | OUTPATIENT
Start: 2020-01-18 | End: 2020-01-18

## 2020-01-18 RX ORDER — OLANZAPINE 10 MG/1
6 INJECTION, POWDER, LYOPHILIZED, FOR SOLUTION INTRAMUSCULAR ONCE
Status: COMPLETED | OUTPATIENT
Start: 2020-01-18 | End: 2020-01-18

## 2020-01-18 RX ADMIN — OLANZAPINE 6 MG: 10 INJECTION, POWDER, FOR SOLUTION INTRAMUSCULAR at 21:02

## 2020-01-19 LAB
ALBUMIN SERPL BCP-MCNC: 4.6 G/DL (ref 3.5–5)
ALP SERPL-CCNC: 64 U/L (ref 46–116)
ALT SERPL W P-5'-P-CCNC: 24 U/L (ref 12–78)
AMPHETAMINES SERPL QL SCN: POSITIVE
ANION GAP SERPL CALCULATED.3IONS-SCNC: 10 MMOL/L (ref 4–13)
APAP SERPL-MCNC: <2 UG/ML (ref 10–20)
AST SERPL W P-5'-P-CCNC: 19 U/L (ref 5–45)
ATRIAL RATE: 89 BPM
BARBITURATES UR QL: NEGATIVE
BASOPHILS # BLD AUTO: 0.03 THOUSANDS/ΜL (ref 0–0.1)
BASOPHILS NFR BLD AUTO: 1 % (ref 0–1)
BENZODIAZ UR QL: POSITIVE
BILIRUB SERPL-MCNC: 0.98 MG/DL (ref 0.2–1)
BUN SERPL-MCNC: 13 MG/DL (ref 5–25)
CALCIUM SERPL-MCNC: 9.8 MG/DL (ref 8.3–10.1)
CHLORIDE SERPL-SCNC: 101 MMOL/L (ref 100–108)
CO2 SERPL-SCNC: 26 MMOL/L (ref 21–32)
COCAINE UR QL: NEGATIVE
CREAT SERPL-MCNC: 0.98 MG/DL (ref 0.6–1.3)
EOSINOPHIL # BLD AUTO: 0.12 THOUSAND/ΜL (ref 0–0.61)
EOSINOPHIL NFR BLD AUTO: 2 % (ref 0–6)
ERYTHROCYTE [DISTWIDTH] IN BLOOD BY AUTOMATED COUNT: 12 % (ref 11.6–15.1)
ETHANOL EXG-MCNC: 0 MG/DL
ETHANOL SERPL-MCNC: <3 MG/DL (ref 0–3)
EXT PREG TEST URINE: NEGATIVE
EXT. CONTROL ED NAV: NORMAL
GFR SERPL CREATININE-BSD FRML MDRD: 81 ML/MIN/1.73SQ M
GLUCOSE SERPL-MCNC: 92 MG/DL (ref 65–140)
HCG SERPL QL: NEGATIVE
HCT VFR BLD AUTO: 44.1 % (ref 34.8–46.1)
HGB BLD-MCNC: 15.2 G/DL (ref 11.5–15.4)
IMM GRANULOCYTES # BLD AUTO: 0.01 THOUSAND/UL (ref 0–0.2)
IMM GRANULOCYTES NFR BLD AUTO: 0 % (ref 0–2)
LYMPHOCYTES # BLD AUTO: 1.99 THOUSANDS/ΜL (ref 0.6–4.47)
LYMPHOCYTES NFR BLD AUTO: 31 % (ref 14–44)
MCH RBC QN AUTO: 33.6 PG (ref 26.8–34.3)
MCHC RBC AUTO-ENTMCNC: 34.5 G/DL (ref 31.4–37.4)
MCV RBC AUTO: 98 FL (ref 82–98)
METHADONE UR QL: NEGATIVE
MONOCYTES # BLD AUTO: 0.89 THOUSAND/ΜL (ref 0.17–1.22)
MONOCYTES NFR BLD AUTO: 14 % (ref 4–12)
NEUTROPHILS # BLD AUTO: 3.38 THOUSANDS/ΜL (ref 1.85–7.62)
NEUTS SEG NFR BLD AUTO: 52 % (ref 43–75)
NRBC BLD AUTO-RTO: 0 /100 WBCS
OPIATES UR QL SCN: NEGATIVE
P AXIS: 68 DEGREES
PCP UR QL: NEGATIVE
PLATELET # BLD AUTO: 213 THOUSANDS/UL (ref 149–390)
PMV BLD AUTO: 9.6 FL (ref 8.9–12.7)
POTASSIUM SERPL-SCNC: 4.4 MMOL/L (ref 3.5–5.3)
PR INTERVAL: 120 MS
PROT SERPL-MCNC: 7.9 G/DL (ref 6.4–8.2)
QRS AXIS: 70 DEGREES
QRSD INTERVAL: 74 MS
QT INTERVAL: 348 MS
QTC INTERVAL: 423 MS
RBC # BLD AUTO: 4.52 MILLION/UL (ref 3.81–5.12)
SALICYLATES SERPL-MCNC: <3 MG/DL (ref 3–20)
SODIUM SERPL-SCNC: 137 MMOL/L (ref 136–145)
T WAVE AXIS: 61 DEGREES
THC UR QL: POSITIVE
TSH SERPL DL<=0.05 MIU/L-ACNC: 0.81 UIU/ML (ref 0.36–3.74)
VENTRICULAR RATE: 89 BPM
WBC # BLD AUTO: 6.42 THOUSAND/UL (ref 4.31–10.16)

## 2020-01-19 PROCEDURE — 36415 COLL VENOUS BLD VENIPUNCTURE: CPT | Performed by: EMERGENCY MEDICINE

## 2020-01-19 PROCEDURE — 85025 COMPLETE CBC W/AUTO DIFF WBC: CPT | Performed by: EMERGENCY MEDICINE

## 2020-01-19 PROCEDURE — 80307 DRUG TEST PRSMV CHEM ANLYZR: CPT | Performed by: EMERGENCY MEDICINE

## 2020-01-19 PROCEDURE — 93010 ELECTROCARDIOGRAM REPORT: CPT | Performed by: INTERNAL MEDICINE

## 2020-01-19 PROCEDURE — 80320 DRUG SCREEN QUANTALCOHOLS: CPT | Performed by: EMERGENCY MEDICINE

## 2020-01-19 PROCEDURE — 96372 THER/PROPH/DIAG INJ SC/IM: CPT

## 2020-01-19 PROCEDURE — 80329 ANALGESICS NON-OPIOID 1 OR 2: CPT | Performed by: EMERGENCY MEDICINE

## 2020-01-19 PROCEDURE — 99283 EMERGENCY DEPT VISIT LOW MDM: CPT | Performed by: EMERGENCY MEDICINE

## 2020-01-19 RX ORDER — OLANZAPINE 10 MG/1
4 INJECTION, POWDER, LYOPHILIZED, FOR SOLUTION INTRAMUSCULAR ONCE
Status: COMPLETED | OUTPATIENT
Start: 2020-01-19 | End: 2020-01-19

## 2020-01-19 RX ORDER — ZIPRASIDONE MESYLATE 20 MG/ML
10 INJECTION, POWDER, LYOPHILIZED, FOR SOLUTION INTRAMUSCULAR ONCE
Status: COMPLETED | OUTPATIENT
Start: 2020-01-19 | End: 2020-01-19

## 2020-01-19 RX ORDER — ALPRAZOLAM 0.25 MG/1
0.5 TABLET ORAL ONCE
Status: COMPLETED | OUTPATIENT
Start: 2020-01-19 | End: 2020-01-19

## 2020-01-19 RX ORDER — DIPHENHYDRAMINE HCL 25 MG
50 TABLET ORAL ONCE
Status: COMPLETED | OUTPATIENT
Start: 2020-01-19 | End: 2020-01-19

## 2020-01-19 RX ORDER — LORAZEPAM 1 MG/1
1 TABLET ORAL ONCE
Status: COMPLETED | OUTPATIENT
Start: 2020-01-19 | End: 2020-01-19

## 2020-01-19 RX ORDER — TRAZODONE HYDROCHLORIDE 50 MG/1
50 TABLET ORAL
Status: DISCONTINUED | OUTPATIENT
Start: 2020-01-19 | End: 2020-01-20 | Stop reason: HOSPADM

## 2020-01-19 RX ADMIN — ZIPRASIDONE MESYLATE 10 MG: 20 INJECTION, POWDER, LYOPHILIZED, FOR SOLUTION INTRAMUSCULAR at 07:06

## 2020-01-19 RX ADMIN — WATER 1.2 ML: 1 INJECTION INTRAMUSCULAR; INTRAVENOUS; SUBCUTANEOUS at 07:33

## 2020-01-19 RX ADMIN — WATER 2 ML: 1 INJECTION INTRAMUSCULAR; INTRAVENOUS; SUBCUTANEOUS at 00:43

## 2020-01-19 RX ADMIN — OLANZAPINE 4 MG: 10 INJECTION, POWDER, FOR SOLUTION INTRAMUSCULAR at 00:43

## 2020-01-19 RX ADMIN — LORAZEPAM 1 MG: 1 TABLET ORAL at 17:46

## 2020-01-19 RX ADMIN — TRAZODONE HYDROCHLORIDE 50 MG: 50 TABLET ORAL at 22:40

## 2020-01-19 RX ADMIN — ALPRAZOLAM 0.5 MG: 0.25 TABLET ORAL at 07:11

## 2020-01-19 RX ADMIN — DIPHENHYDRAMINE HCL 50 MG: 25 TABLET ORAL at 22:03
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
## 2020-01-19 NOTE — ED NOTES
Unable to change patient into paper clothing at this time due to highly agitated state     Pt states  Trina Kenyon You are now talking to Renetta Stewart and shes not nice    I created an alterego due to my past trauma     Marie Ghosh RN  01/18/20 2110

## 2020-01-19 NOTE — ED NOTES
Escorted patient to room 9 for shower with security as backup        Prudence Pedraza  01/19/20 0864

## 2020-01-19 NOTE — ED NOTES
Ordered patient dinner tray  Patient is calm and cooperative at this time  1;1 sitter is present  Will continue to monitor       Caroline Marie  01/19/20 0898
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
## 2020-01-19 NOTE — ED NOTES
Attempted to obtain POCT BAT, patient refused to provide breath sample        Kaley Smiley  01/18/20 6191

## 2020-01-19 NOTE — ED NOTES
Insurance Authorization for admission:   [facility] states patient is inactive as of 1/19/2020  Patient has no insurance        I

## 2020-01-19 NOTE — ED NOTES
Patient changed out of her own clothing and into paper scrubs at this time by three female staff members  Patient slightly resistant to changing but mostly cooperative at this time        Miller Duron RN  01/19/20 6651

## 2020-01-19 NOTE — ED NOTES
Patient stating "Can I see my family? I hear them out there " Pt informed her family members are at home and not present in hospital at this time  Patient also asking for something to eat at this time  Will provide snacks       Jacy Velasquez RN  01/19/20 6687
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
## 2020-01-19 NOTE — ED NOTES
Spoke with Justin, patient was denied by their clinical team due to not being appropriate for their program      Patient has no insurance and will need an in network U bed when one becomes available

## 2020-01-19 NOTE — ED NOTES
Patients 2 cousins in to visit  Cousin also brought in for patient her requested medical paperwork for Dr Shanique Marshall  Patient calm and cooperative at this time  1;1 sitter is present  Will continue to monitor       Vanessa Efe  01/19/20 1151

## 2020-01-19 NOTE — ED NOTES
Delivered lunch tray to patient  Patient eating lunch with lights off and tv on in room  No other wants or complaints at this time  1;1 sitter is present  Will continue to monitor       Hoang Forte  01/19/20 9287

## 2020-01-19 NOTE — ED NOTES
1;1 sitter comes to window and stated that patient would like something to sleep  It is 1o'clock in the afternoon and patient was napping a few minutes prior  Asked patient if she needed something for anxiety or sleep due to it being in the afternoon? Patient then stated that when she woke up from her nap she could not breath? ?   Nurse notified of this         Merlinda Rome  01/19/20 1080

## 2020-01-19 NOTE — ED NOTES
No in network U beds, per RACHID Varela  Intake and Referral      Spoke with [facility] [phone], they have a bed and will review  201 and clinical faxed to [facility] [phone]

## 2020-01-19 NOTE — ED NOTES
Patient sleeping and is in no distress at this time  1;1 sitter is present  Will continue to monitor       Mandy Ek  01/19/20 5855

## 2020-01-19 NOTE — ED NOTES
Patient sleeping with lights off and tv on in room  1;1 sitter is present  Will continue to monitor       Mandy Ek  01/19/20 3677
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
## 2020-01-20 ENCOUNTER — HOSPITAL ENCOUNTER (INPATIENT)
Facility: HOSPITAL | Age: 25
LOS: 7 days | Discharge: HOME | DRG: 885 | End: 2020-01-27
Attending: PSYCHIATRY & NEUROLOGY | Admitting: PSYCHIATRY & NEUROLOGY

## 2020-01-20 VITALS
TEMPERATURE: 98.3 F | RESPIRATION RATE: 16 BRPM | OXYGEN SATURATION: 98 % | HEART RATE: 93 BPM | WEIGHT: 112.43 LBS | HEIGHT: 63 IN | DIASTOLIC BLOOD PRESSURE: 68 MMHG | BODY MASS INDEX: 19.92 KG/M2 | SYSTOLIC BLOOD PRESSURE: 121 MMHG

## 2020-01-20 DIAGNOSIS — R29.818 EXTRAPYRAMIDAL SYMPTOM: Primary | ICD-10-CM

## 2020-01-20 DIAGNOSIS — F43.10 PTSD (POST-TRAUMATIC STRESS DISORDER): ICD-10-CM

## 2020-01-20 DIAGNOSIS — F29 PSYCHOSIS (HCC): ICD-10-CM

## 2020-01-20 DIAGNOSIS — F33.3 SEVERE EPISODE OF RECURRENT MAJOR DEPRESSIVE DISORDER, WITH PSYCHOTIC FEATURES (HCC): ICD-10-CM

## 2020-01-20 DIAGNOSIS — F41.1 GENERALIZED ANXIETY DISORDER: ICD-10-CM

## 2020-01-20 PROCEDURE — 96372 THER/PROPH/DIAG INJ SC/IM: CPT

## 2020-01-20 PROCEDURE — 99283 EMERGENCY DEPT VISIT LOW MDM: CPT | Performed by: PSYCHIATRY & NEUROLOGY

## 2020-01-20 RX ORDER — LORAZEPAM 2 MG/ML
1 INJECTION INTRAMUSCULAR ONCE
Status: COMPLETED | OUTPATIENT
Start: 2020-01-20 | End: 2020-01-20

## 2020-01-20 RX ORDER — LORAZEPAM 2 MG/ML
2 INJECTION INTRAMUSCULAR EVERY 6 HOURS PRN
Status: CANCELLED | OUTPATIENT
Start: 2020-01-20

## 2020-01-20 RX ORDER — RISPERIDONE 1 MG/1
1 TABLET, ORALLY DISINTEGRATING ORAL
Status: DISCONTINUED | OUTPATIENT
Start: 2020-01-20 | End: 2020-01-27 | Stop reason: HOSPADM

## 2020-01-20 RX ORDER — HYDROXYZINE HYDROCHLORIDE 25 MG/1
50 TABLET, FILM COATED ORAL EVERY 6 HOURS PRN
Status: CANCELLED | OUTPATIENT
Start: 2020-01-20

## 2020-01-20 RX ORDER — RISPERIDONE 1 MG/1
1 TABLET, ORALLY DISINTEGRATING ORAL
Status: CANCELLED | OUTPATIENT
Start: 2020-01-20

## 2020-01-20 RX ORDER — ACETAMINOPHEN 325 MG/1
350 TABLET ORAL EVERY 6 HOURS PRN
Status: DISCONTINUED | OUTPATIENT
Start: 2020-01-20 | End: 2020-01-27 | Stop reason: HOSPADM

## 2020-01-20 RX ORDER — ACETAMINOPHEN 325 MG/1
975 TABLET ORAL EVERY 6 HOURS PRN
Status: DISCONTINUED | OUTPATIENT
Start: 2020-01-20 | End: 2020-01-27 | Stop reason: HOSPADM

## 2020-01-20 RX ORDER — HYDROXYZINE HYDROCHLORIDE 25 MG/1
25 TABLET, FILM COATED ORAL EVERY 6 HOURS PRN
Status: CANCELLED | OUTPATIENT
Start: 2020-01-20

## 2020-01-20 RX ORDER — LORAZEPAM 1 MG/1
1 TABLET ORAL EVERY 6 HOURS PRN
Status: CANCELLED | OUTPATIENT
Start: 2020-01-20

## 2020-01-20 RX ORDER — ACETAMINOPHEN 325 MG/1
975 TABLET ORAL EVERY 6 HOURS PRN
Status: CANCELLED | OUTPATIENT
Start: 2020-01-20

## 2020-01-20 RX ORDER — ACETAMINOPHEN 325 MG/1
350 TABLET ORAL EVERY 6 HOURS PRN
Status: CANCELLED | OUTPATIENT
Start: 2020-01-20

## 2020-01-20 RX ORDER — OLANZAPINE 10 MG/1
10 INJECTION, POWDER, LYOPHILIZED, FOR SOLUTION INTRAMUSCULAR EVERY 8 HOURS PRN
Status: CANCELLED | OUTPATIENT
Start: 2020-01-20

## 2020-01-20 RX ORDER — BENZTROPINE MESYLATE 1 MG/ML
1 INJECTION INTRAMUSCULAR; INTRAVENOUS EVERY 6 HOURS PRN
Status: CANCELLED | OUTPATIENT
Start: 2020-01-20

## 2020-01-20 RX ORDER — HALOPERIDOL 5 MG/ML
5 INJECTION INTRAMUSCULAR EVERY 6 HOURS PRN
Status: DISCONTINUED | OUTPATIENT
Start: 2020-01-20 | End: 2020-01-27 | Stop reason: HOSPADM

## 2020-01-20 RX ORDER — LORAZEPAM 1 MG/1
1 TABLET ORAL EVERY 6 HOURS PRN
Status: DISCONTINUED | OUTPATIENT
Start: 2020-01-20 | End: 2020-01-27 | Stop reason: HOSPADM

## 2020-01-20 RX ORDER — TRAZODONE HYDROCHLORIDE 50 MG/1
50 TABLET ORAL
Status: CANCELLED | OUTPATIENT
Start: 2020-01-20

## 2020-01-20 RX ORDER — LORAZEPAM 1 MG/1
1 TABLET ORAL ONCE
Status: COMPLETED | OUTPATIENT
Start: 2020-01-20 | End: 2020-01-20

## 2020-01-20 RX ORDER — OLANZAPINE 10 MG/1
10 TABLET ORAL EVERY 8 HOURS PRN
Status: DISCONTINUED | OUTPATIENT
Start: 2020-01-20 | End: 2020-01-27 | Stop reason: HOSPADM

## 2020-01-20 RX ORDER — HALOPERIDOL 5 MG
5 TABLET ORAL EVERY 6 HOURS PRN
Status: CANCELLED | OUTPATIENT
Start: 2020-01-20

## 2020-01-20 RX ORDER — HALOPERIDOL 5 MG
5 TABLET ORAL EVERY 6 HOURS PRN
Status: DISCONTINUED | OUTPATIENT
Start: 2020-01-20 | End: 2020-01-27 | Stop reason: HOSPADM

## 2020-01-20 RX ORDER — OLANZAPINE 10 MG/1
10 INJECTION, POWDER, LYOPHILIZED, FOR SOLUTION INTRAMUSCULAR EVERY 8 HOURS PRN
Status: DISCONTINUED | OUTPATIENT
Start: 2020-01-20 | End: 2020-01-27 | Stop reason: HOSPADM

## 2020-01-20 RX ORDER — MAGNESIUM HYDROXIDE/ALUMINUM HYDROXICE/SIMETHICONE 120; 1200; 1200 MG/30ML; MG/30ML; MG/30ML
30 SUSPENSION ORAL EVERY 4 HOURS PRN
Status: DISCONTINUED | OUTPATIENT
Start: 2020-01-20 | End: 2020-01-27 | Stop reason: HOSPADM

## 2020-01-20 RX ORDER — LORAZEPAM 2 MG/ML
2 INJECTION INTRAMUSCULAR EVERY 6 HOURS PRN
Status: DISCONTINUED | OUTPATIENT
Start: 2020-01-20 | End: 2020-01-27 | Stop reason: HOSPADM

## 2020-01-20 RX ORDER — ZIPRASIDONE MESYLATE 20 MG/ML
20 INJECTION, POWDER, LYOPHILIZED, FOR SOLUTION INTRAMUSCULAR ONCE
Status: COMPLETED | OUTPATIENT
Start: 2020-01-20 | End: 2020-01-20

## 2020-01-20 RX ORDER — MAGNESIUM HYDROXIDE/ALUMINUM HYDROXICE/SIMETHICONE 120; 1200; 1200 MG/30ML; MG/30ML; MG/30ML
30 SUSPENSION ORAL EVERY 4 HOURS PRN
Status: CANCELLED | OUTPATIENT
Start: 2020-01-20

## 2020-01-20 RX ORDER — HYDROXYZINE 50 MG/1
50 TABLET, FILM COATED ORAL EVERY 6 HOURS PRN
Status: DISCONTINUED | OUTPATIENT
Start: 2020-01-20 | End: 2020-01-21

## 2020-01-20 RX ORDER — ACETAMINOPHEN 325 MG/1
650 TABLET ORAL EVERY 4 HOURS PRN
Status: CANCELLED | OUTPATIENT
Start: 2020-01-20

## 2020-01-20 RX ORDER — TRAZODONE HYDROCHLORIDE 50 MG/1
50 TABLET ORAL
Status: DISCONTINUED | OUTPATIENT
Start: 2020-01-20 | End: 2020-01-21

## 2020-01-20 RX ORDER — BENZTROPINE MESYLATE 0.5 MG/1
1 TABLET ORAL EVERY 6 HOURS PRN
Status: CANCELLED | OUTPATIENT
Start: 2020-01-20

## 2020-01-20 RX ORDER — BENZTROPINE MESYLATE 1 MG/1
1 TABLET ORAL EVERY 6 HOURS PRN
Status: DISCONTINUED | OUTPATIENT
Start: 2020-01-20 | End: 2020-01-27 | Stop reason: HOSPADM

## 2020-01-20 RX ORDER — HALOPERIDOL 5 MG/ML
5 INJECTION INTRAMUSCULAR EVERY 6 HOURS PRN
Status: CANCELLED | OUTPATIENT
Start: 2020-01-20

## 2020-01-20 RX ORDER — HYDROXYZINE HYDROCHLORIDE 25 MG/1
25 TABLET, FILM COATED ORAL EVERY 6 HOURS PRN
Status: DISCONTINUED | OUTPATIENT
Start: 2020-01-20 | End: 2020-01-27 | Stop reason: HOSPADM

## 2020-01-20 RX ORDER — OLANZAPINE 10 MG/1
10 TABLET ORAL EVERY 8 HOURS PRN
Status: CANCELLED | OUTPATIENT
Start: 2020-01-20

## 2020-01-20 RX ORDER — BENZTROPINE MESYLATE 1 MG/ML
1 INJECTION INTRAMUSCULAR; INTRAVENOUS EVERY 6 HOURS PRN
Status: DISCONTINUED | OUTPATIENT
Start: 2020-01-20 | End: 2020-01-27 | Stop reason: HOSPADM

## 2020-01-20 RX ORDER — ACETAMINOPHEN 325 MG/1
650 TABLET ORAL EVERY 4 HOURS PRN
Status: DISCONTINUED | OUTPATIENT
Start: 2020-01-20 | End: 2020-01-27 | Stop reason: HOSPADM

## 2020-01-20 RX ADMIN — ZIPRASIDONE MESYLATE 20 MG: 20 INJECTION, POWDER, LYOPHILIZED, FOR SOLUTION INTRAMUSCULAR at 04:17

## 2020-01-20 RX ADMIN — LORAZEPAM 1 MG: 2 INJECTION INTRAMUSCULAR; INTRAVENOUS at 04:17

## 2020-01-20 RX ADMIN — TRAZODONE HYDROCHLORIDE 50 MG: 50 TABLET ORAL at 21:30

## 2020-01-20 RX ADMIN — HYDROXYZINE HYDROCHLORIDE 50 MG: 50 TABLET, FILM COATED ORAL at 21:30

## 2020-01-20 RX ADMIN — LORAZEPAM 1 MG: 1 TABLET ORAL at 14:13

## 2020-01-20 NOTE — ED NOTES
Patient requesting phone to call boyfriend  Portable phone was provided  Will continue to monitor         Emmett Cheema  01/19/20 2058

## 2020-01-20 NOTE — TRANSPORTATION MEDICAL NECESSITY
Section I - General Information    Name of Patient: Esthela Hurt                 : 1995    Medicare #:   Transport Date: 20 (PCS is valid for round trips on this date and for all repetitive trips in the 60-day range as noted below )  Origin: [address] [place] [address] Drive: [address] [place] [address] [place]be L[place], [address]  Is the pt's stay covered under Medicare Part A (PPS/DRG)   []     Closest appropriate facility? If no, why is transport to more distant facility required? Yes  If hospice pt, is this transport related to pt's terminal illness? NA       Section II - Medical Necessity Questionnaire  Ambulance transportation is medically necessary only if other means of transport are contraindicated or would be potentially harmful to the patient  To meet this requirement, the patient must either be "bed confined" or suffer from a condition such that transport by means other than ambulance is contraindicated by the patient's condition  The following questions must be answered by the medical professional signing below for this form to be valid:    1)  Describe the MEDICAL CONDITION (physical and/or mental) of this patient AT [address] [place] [address] that requires the patient to be transported in an ambulance and why transport by other means is contraindicated by the patient's condition:Patient signed a 201, patient is a danger to self/others  Unable to be safely transported unattended via wheelchair van and medical attendant is required to maintain patient safety during transport  2) Is the patient "bed confined" as defined below? No  To be "be confined" the patient must satisfy all three of the following conditions: (1) unable to get up from bed without Assistance; AND (2) unable to ambulate; AND (3) unable to sit in a chair or wheelchair      3) Can this patient safely be transported by car or wheelchair van (i e , seated during transport without a medical attendant or monitoring)? No    4) In addition to completing questions 1-3 above, please check any of the following conditions that apply*:   *Note: supporting documentation for any boxes checked must be maintained in the patient's medical records  If hosp-hosp transfer, describe services needed at 2nd facility not available at 1st facility? Danger to self/others  Medical attendant required   Unable to tolerate seated position for time needed to transport   Other(specify) 201      Section III - Signature of Physician or Healthcare Professional  I certify that the above information is true and correct based on my evaluation of this patient, and represent that the patient requires transport by ambulance and that other forms of transport are contraindicated  I understand that this information will be used by the Centers for Medicare and Medicaid Services (CMS) to support the determination of medical necessity for ambulance services, and I represent that I have personal knowledge of the patient's condition at time of transport  []  If this box is checked, I also certify that the patient is physically or mentally incapable of signing the ambulance service's claim and that the institution with which I am affiliated has furnished care, services, or assistance to the patient  My signature below is made on behalf of the patient pursuant to 42 CFR §424 36(b)(4)  In accordance with 42 CFR §424 37, the specific reason(s) that the patient is physically or mentally incapable of signing the claim form is as follows: N/A        Signature of Physician* or Healthcare Professional______________________________________________________________  Signature Date 01/20/20 (For scheduled repetitive transports, this form is not valid for transports performed more than 60 days after this date)    Printed Name & Credentials of Physician or Healthcare Professional (DO SONIYA, RN, etc )________________________________  *Form must be signed by patient's attending physician for scheduled, repetitive transports   For non-repetitive, unscheduled ambulance transports, if unable to obtain the signature of the attending physician, any of the following may sign (choose appropriate option below)  [] Physician Assistant []  Clinical Nurse Specialist []  Registered Nurse  []  Nurse Practitioner  [x] Discharge Planner

## 2020-01-20 NOTE — ED NOTES
Patient calm and cooperative at this time  Patients visitors have left the [address] [place] [address] their belongings given back to them from the visitor lockers         Savannah Gabriel  01/19/20 1938
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
## 2020-01-20 NOTE — ED NOTES
Patient came up and asked about getting her insurance in order, I advised I would check with crisis and patient became angry and started cursing  I spoke with crisis and she has no insurance and when she is placed they will help her with paper work       Melly Al [facility]  01/20/20 7292

## 2020-01-20 NOTE — EMTALA/ACUTE CARE TRANSFER
[address] [place] [address]  Dept: [phone]      EMTALA TRANSFER CONSENT    NAME Juwan Abrams                                         1995                              MRN [id number]    I have been informed of my rights regarding examination, treatment, and transfer   by Dr Bharathi Renteria MD    Benefits: Other benefits (Include comment)_______________________(201)    Risks: Potential for delay in receiving treatment, Possible worsening of condition or death during transfer, Other: (Include comment)__________________________(201)      Transfer Request   I acknowledge that my medical condition has been evaluated and explained to me by the emergency department physician or other qualified medical person and/or my attending physician who has recommended and offered to me further medical examination and treatment  I understand the Hospital's obligation with respect to the treatment and stabilization of my emergency medical condition  I nevertheless request to be transferred  I release the Hospital, the doctor, and any other persons caring for me from all responsibility or liability for any injury or ill effects that may result from my transfer and agree to accept all responsibility for the consequences of my choice to transfer, rather than receive stabilizing treatment at the Hospital  I understand that because the transfer is my request, my insurance may not provide reimbursement for the services  The Hospital will assist and direct me and my family in how to make arrangements for transfer, but the hospital is not liable for any fees charged by the transport service  In spite of this understanding, I refuse to consent to further medical examination and treatment which has been offered to me, and request transfer to  Neha Pearce Name, [address] : [id number]   I authorize the performance of emergency medical procedures and treatments upon me in both transit and upon arrival at the receiving facility  Additionally, I authorize the release of any and all medical records to the receiving facility and request they be transported with me, if possible  I authorize the performance of emergency medical procedures and treatments upon me in both transit and upon arrival at the receiving facility  Additionally, I authorize the release of any and all medical records to the receiving facility and request they be transported with me, if possible  I understand that the safest mode of transportation during a medical emergency is an ambulance and that the Hospital advocates the use of this mode of transport  Risks of traveling to the receiving facility by car, including absence of medical control, life sustaining equipment, such as oxygen, and medical personnel has been explained to me and I fully understand them  (BRISSA CORRECT BOX BELOW)  [  ]  I consent to the stated transfer and to be transported by ambulance/helicopter  [  ]  I consent to the stated transfer, but refuse transportation by ambulance and accept full responsibility for my transportation by car  I understand the risks of non-ambulance transfers and I exonerate the Hospital and its staff from any deterioration in my condition that results from this refusal     X___________________________________________    DATE  20  TIME________  Signature of patient or legally responsible individual signing on patient behalf           RELATIONSHIP TO PATIENT_________________________          Provider Certification    NAME Duyen Singh                                        [facility] 1995                              MRN [id number]    A medical screening exam was performed on the above named patient  Based on the examination:    Condition Necessitating Transfer The primary encounter diagnosis was Psychosis ([address] )   A diagnosis of Methamphetamine abuse ([address] ) was also pertinent to this visit  Patient Condition: Other (Include comment)_________________________________________(201)    Reason for Transfer: Level of Care needed not available at this facility, Other (Include comment)____________________(201)    Transfer Requirements: Facility [facility]   · Space available and qualified personnel available for treatment as acknowledged by Brian Moses in intake  · Agreed to accept transfer and to provide appropriate medical treatment as acknowledged by       Dr Elizabeth Jose  · Appropriate medical records of the examination and treatment of the patient are provided at the time of transfer   [facility], [address] _______  · Transfer will be performed by qualified personnel from [facility] at 5:30 pm  and appropriate transfer equipment as required, including the use of necessary and appropriate life support measures  Provider Certification: I have examined the patient and explained the following risks and benefits of being transferred/refusing transfer to the patient/family:         Based on these reasonable risks and benefits to the patient and/or the unborn child(mo), and based upon the information available at the time of the patients examination, I certify that the medical benefits reasonably to be expected from the provision of appropriate medical treatments at another medical facility outweigh the increasing risks, if any, to the individuals medical condition, and in the case of labor to the unborn child, from effecting the transfer      X____________________________________________ DATE 01/20/20        TIME_______      ORIGINAL - SEND TO MEDICAL RECORDS   COPY - SEND WITH PATIENT DURING TRANSFER

## 2020-01-20 NOTE — ED NOTES
CM called Tila at [facility]; there are not currently any beds but Patrick Pederson does have patient's 201 and will call CM back once discharge meetings take place this morning  CM will wait to hear if in network bed is available and will continue to follow patient  ED provider and Charge RN aware

## 2020-01-20 NOTE — ED NOTES
Patient resting comfortably in room with lights off and tv on  Patient given portable phone to call boyfriend  Will continue to monitor       Laci Dalila  01/19/20 5724

## 2020-01-20 NOTE — ED NOTES
Patient calm and cooperative watching tv with lights off and tv on in room  Will continue to monitor       Jenna Martinez  01/19/20 0543

## 2020-01-20 NOTE — ED NOTES
CM received message from [place] with [facility] Intake; patient is being reviewed for admission to [facility]  CM will continue to follow patient
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
## 2020-01-20 NOTE — ED NOTES
Back from showering  Fresh linens and scrubs provided  Will continue to monitor       Jensyayarafat Montse  01/19/20 1936

## 2020-01-21 PROBLEM — Z00.8 MEDICAL CLEARANCE FOR PSYCHIATRIC ADMISSION: Status: ACTIVE | Noted: 2020-01-21

## 2020-01-21 PROBLEM — F43.10 PTSD (POST-TRAUMATIC STRESS DISORDER): Status: ACTIVE | Noted: 2020-01-21

## 2020-01-21 PROBLEM — F29 PSYCHOSIS (HCC): Status: ACTIVE | Noted: 2020-01-21

## 2020-01-21 PROBLEM — F19.10 POLYSUBSTANCE ABUSE (HCC): Status: ACTIVE | Noted: 2020-01-21

## 2020-01-21 PROBLEM — E28.2 PCOS (POLYCYSTIC OVARIAN SYNDROME): Status: ACTIVE | Noted: 2020-01-21

## 2020-01-21 LAB
BACTERIA UR QL AUTO: NORMAL /HPF
BILIRUB UR QL STRIP: NEGATIVE
CHOLEST SERPL-MCNC: 141 MG/DL (ref 50–200)
CLARITY UR: CLEAR
COLOR UR: YELLOW
EST. AVERAGE GLUCOSE BLD GHB EST-MCNC: 103 MG/DL
GLUCOSE UR STRIP-MCNC: NEGATIVE MG/DL
HBA1C MFR BLD: 5.2 % (ref 4.2–6.3)
HDLC SERPL-MCNC: 45 MG/DL
HGB UR QL STRIP.AUTO: ABNORMAL
KETONES UR STRIP-MCNC: NEGATIVE MG/DL
LDLC SERPL CALC-MCNC: 86 MG/DL (ref 0–100)
LEUKOCYTE ESTERASE UR QL STRIP: NEGATIVE
NITRITE UR QL STRIP: NEGATIVE
NON-SQ EPI CELLS URNS QL MICRO: NORMAL /HPF
NONHDLC SERPL-MCNC: 96 MG/DL
PH UR STRIP.AUTO: 6 [PH]
PROT UR STRIP-MCNC: NEGATIVE MG/DL
RBC #/AREA URNS AUTO: NORMAL /HPF
RPR SER QL: NORMAL
SP GR UR STRIP.AUTO: 1 (ref 1–1.03)
TRIGL SERPL-MCNC: 49 MG/DL
UROBILINOGEN UR QL STRIP.AUTO: 0.2 E.U./DL
WBC #/AREA URNS AUTO: NORMAL /HPF

## 2020-01-21 PROCEDURE — 81001 URINALYSIS AUTO W/SCOPE: CPT | Performed by: PHYSICIAN ASSISTANT

## 2020-01-21 PROCEDURE — 86592 SYPHILIS TEST NON-TREP QUAL: CPT | Performed by: PHYSICIAN ASSISTANT

## 2020-01-21 PROCEDURE — 83036 HEMOGLOBIN GLYCOSYLATED A1C: CPT | Performed by: PHYSICIAN ASSISTANT

## 2020-01-21 PROCEDURE — 99222 1ST HOSP IP/OBS MODERATE 55: CPT | Performed by: PSYCHIATRY & NEUROLOGY

## 2020-01-21 PROCEDURE — 99252 IP/OBS CONSLTJ NEW/EST SF 35: CPT | Performed by: PHYSICIAN ASSISTANT

## 2020-01-21 PROCEDURE — 80061 LIPID PANEL: CPT | Performed by: PHYSICIAN ASSISTANT

## 2020-01-21 RX ORDER — BENZTROPINE MESYLATE 0.5 MG/1
0.5 TABLET ORAL 2 TIMES DAILY
Status: DISCONTINUED | OUTPATIENT
Start: 2020-01-21 | End: 2020-01-22

## 2020-01-21 RX ORDER — HYDROXYZINE 50 MG/1
50 TABLET, FILM COATED ORAL 3 TIMES DAILY PRN
Status: DISCONTINUED | OUTPATIENT
Start: 2020-01-21 | End: 2020-01-27 | Stop reason: HOSPADM

## 2020-01-21 RX ORDER — QUETIAPINE FUMARATE 100 MG/1
100 TABLET, FILM COATED ORAL
Status: DISCONTINUED | OUTPATIENT
Start: 2020-01-21 | End: 2020-01-25

## 2020-01-21 RX ORDER — HALOPERIDOL 2 MG/1
2 TABLET ORAL 2 TIMES DAILY
Status: DISCONTINUED | OUTPATIENT
Start: 2020-01-21 | End: 2020-01-21

## 2020-01-21 RX ORDER — TRAZODONE HYDROCHLORIDE 150 MG/1
150 TABLET ORAL
Status: DISCONTINUED | OUTPATIENT
Start: 2020-01-21 | End: 2020-01-27 | Stop reason: HOSPADM

## 2020-01-21 RX ORDER — LORAZEPAM 1 MG/1
1 TABLET ORAL 3 TIMES DAILY
Status: DISCONTINUED | OUTPATIENT
Start: 2020-01-21 | End: 2020-01-22

## 2020-01-21 RX ORDER — PRAZOSIN HYDROCHLORIDE 1 MG/1
1 CAPSULE ORAL
Status: DISCONTINUED | OUTPATIENT
Start: 2020-01-21 | End: 2020-01-27 | Stop reason: HOSPADM

## 2020-01-21 RX ADMIN — BENZTROPINE MESYLATE 0.5 MG: 0.5 TABLET ORAL at 10:25

## 2020-01-21 RX ADMIN — LORAZEPAM 1 MG: 1 TABLET ORAL at 10:26

## 2020-01-21 RX ADMIN — HALOPERIDOL 3 MG: 1 TABLET ORAL at 19:02

## 2020-01-21 RX ADMIN — NICOTINE POLACRILEX 2 MG: 2 GUM, CHEWING BUCCAL at 11:57

## 2020-01-21 RX ADMIN — LORAZEPAM 1 MG: 1 TABLET ORAL at 19:04

## 2020-01-21 RX ADMIN — BENZTROPINE MESYLATE 0.5 MG: 0.5 TABLET ORAL at 19:01

## 2020-01-21 RX ADMIN — HALOPERIDOL 3 MG: 1 TABLET ORAL at 10:22

## 2020-01-21 RX ADMIN — QUETIAPINE FUMARATE 100 MG: 100 TABLET ORAL at 22:46
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
## 2020-01-21 NOTE — PROGRESS NOTES
Belongings Brought in to Pt   During visit    At bedside:  [address] [place] [address] Road   2 Pants  Sweater    Locker:  Benson

## 2020-01-21 NOTE — ASSESSMENT & PLAN NOTE
· Presented to the emergency department with paranoid delusions, hallucinations  · Further plan per psychiatry

## 2020-01-21 NOTE — TREATMENT PLAN
TREATMENT PLAN REVIEW - [address] 24 y o  1995 female MRN: [id number]    [address] [place] [address] Room / Bed: [place] [address] Encounter: [id number]          Admit Date/Time:  1/20/2020  6:42 PM    Treatment Team: Attending Provider: Brit Vazquez MD; Patient Care Technician: Mer Arndt; Consulting Physician: Cindy Payne MD; Patient Care Technician: Luis F Becerra; Registered Nurse: Susan Luciano RN; Medications RN: Yang Long RN; Security: Cindy Garcia; Patient Care Assistant: Latonya Hernandez;  : Teo Mitchell; Occupational Therapy Assistant: LEANNA Lema; Care Manager: Donaldo Enriquez RN; Medications RN: Evonne Landa RN; Patient Care Technician: Nicky Mcelroy; Registered Nurse: Jennifer Burr RN    Diagnosis: Principal Problem:    Psychosis [facility])  Active Problems:    Severe episode of recurrent major depressive disorder, with psychotic features [facility])    Medical clearance for psychiatric admission    Polysubstance abuse ([facility] [address] )    PCOS (polycystic ovarian syndrome)    PTSD (post-traumatic stress disorder)      Patient Strengths/Assets: motivated    Patient Barriers/Limitations: difficulty adapting    Short Term Goals: decrease in depressive symptoms, decrease in anxiety symptoms, decrease in paranoid thoughts, decrease in psychotic symptoms, decrease in suicidal thoughts    Long Term Goals: improvement in depression, improvement in anxiety, stabilization of mood, free of suicidal thoughts    Progress Towards Goals: starting psychiatric medications as prescribed    Recommended Treatment: medication management, patient medication education, group therapy, milieu therapy, continued Behavioral Health psychiatric evaluation/assessment process    Treatment Frequency: daily medication monitoring, group and milieu therapy daily, monitoring through interdisciplinary rounds, monitoring through weekly patient care conferences    Expected Discharge Date:  7-10 days    Discharge Plan: discharge to home    Treatment Plan Created/Updated By: Renaldo Chambers MD

## 2020-01-21 NOTE — MALNUTRITION/BMI
This medical record reflects one or more clinical indicators suggestive of malnutrition and/or morbid obesity  BMI Findings:  BMI Classifications: Underweight < 18 5     Body mass index is 18 4 kg/m²  Treat with diet and consider po supplement if po intake consistently below 50% for greater than 2 meals daily  See Nutrition note dated 1/21/2019 for additional details  Completed nutrition assessment is viewable in the nutrition documentation

## 2020-01-21 NOTE — DISCHARGE INSTR - OTHER ORDERS
[facility] [place] [facility], [address] Crisis Telephone Number: [phone] [address] [address]  [address] [place] [address] [facility]  [phone]  Financial Help Provided: SNAP, [employer], Food [place], Welfare Office, [facility]  Please visit this office to complete your application for Medical Assistance    The [employer] Program is a free 12-week (2 1/2 hours/week) course for families of individuals with severe brain disorders (mental illnesses)  The classes are taught by trained family members  All course materials are furnished at no cost to you  Below are some details  To register, e-mail [email or web address] or call [phone]  The curriculum focuses on schizophrenia, bipolar disorder (manic depression), clinical depression, panic disorders and obsessive-compulsive disorder (OCD)  The course discusses the clinical treatment of these illnesses and teaches the knowledge and skills that family members need to cope more effectively  Topics Include:  Learning about feelings, learning about facts   Schizophrenia, major depression and stephanie: diagnosis and dealing with critical periods   Subtypes of depression and bipolar disorder, panic disorder and OCD; diagnosis and causes; sharing our stories   The biology of the brain/new research   Problem solving workshops   Medication review   Empathy workshop  what its like to have a brain disorder   Communication skills workshop   Self-care and relative groups   Rehabilitation, services available   Advocacy: fighting stigma   Review and certification ceremony    [employer] Education Course  The [employer] Education class is a ten week  two hours per week  experiential education course on the topic of recovery for any person with serious mental illness who is interested in establishing and maintaining wellness   The course uses a combination of lecture, interactive exercises, and structural group processes  The diversity of experience among participants affords for a lively dynamic that moves the course along  [employer]s Dhy[employer] Education class is offered free of charge to people who experience mental illness  You do not need to be a member of [employer] to take the course  Courses are taught by teams of trained mentors/peer-teachers who are themselves experienced at living well with mental illness  Below are some details  To register, call [phone] or e-mail [email or web address]  Sign up today! [facility] group is for family members, caregivers, and loved ones of individuals living with the everyday challenges of mental illness  The leaders are family members in the same situation  Sessions take place in an intimate, confidential setting to allow families to share openly with each other  These support groups allow participants to learn from the experiences of other group members, share coping strategies, and offer each other encouragement and understanding  Janeth Rivera know that you are not alone  Drop inno registration is necessary  Here are the times and locations  MARSHA[place]  Monthly: 3rd Monday, 7:00-8:30 pm  [facility] [place]  Monthly: 4th Tuesday, 7:00-8:30 pm  [facility]  Monthly: 1st Monday, 7:00-8:30 pm  [address]         Monthly Support for Persons with Mental Illness  The Peer Support Group is a monthly meeting for individuals facing the challenges of recovering from severe and persistent mental illness  Depression, manic depression, schizophrenia, and general anxiety disorder are only a few of the diagnoses of individuals who have found a supportive place at our meetings    Our [place]  We are a fellowship of individuals who share a common goal of recovery and the ability to maintain mental and emotional stability  We help others and ourselves through sharing our experiences, strength and hope with each other  No matter how traumatic our past or how despairing our present may be, there is hope for a new day  Sessions take place in an intimate, confidential setting to allow individuals to share openly with each other  Sundra Crimes know that you are not alone  Drop inno registration is necessary  Here are the times and locations    [place]  Monthly: 1st Monday, 7:00-8:30 pm  [facility]  [id number] [place] [address]   [id number]  Monthly: 3rd Monday, 7:00-8:30 pm  [address]  [address] [facility]
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
## 2020-01-21 NOTE — TREATMENT PLAN
RN will assess the patient at least twice daily for symptoms of admission and educate patient on diagnoses, medications and healthy coping skills

## 2020-01-21 NOTE — PROGRESS NOTES
Pt requested and received PRN Trazodone 50mg po for sleep and Prn Atarax 25mg po for anxiety at 2133  Pt appears calm and cooperative, social with new roommate just moments after receiving PRN medications

## 2020-01-21 NOTE — PLAN OF CARE
Problem: DEPRESSION  Goal: Will be euthymic at discharge  Description  INTERVENTIONS:  - Administer medication as ordered  - Provide emotional support via 1:1 interaction with staff  - Encourage involvement in milieu/groups/activities  - Monitor for social isolation  Outcome: Progressing     Problem: PSYCHOSIS  Goal: Will report no hallucinations or delusions  Description  Interventions:  - Administer medication as  ordered  - Every waking shifts and PRN assess for the presence of hallucinations and or delusions  - Assist with reality testing to support increasing orientation  - Assess if patient's hallucinations or delusions are encouraging self-harm or harm to others and intervene as appropriate  Outcome: Progressing     Problem: ANXIETY  Goal: Will report anxiety at manageable levels  Description  INTERVENTIONS:  - Administer medication as ordered  - Teach and encourage coping skills  - Provide emotional support  - Assess patient/family for anxiety and ability to cope  Outcome: Progressing  Goal: By discharge: Patient will verbalize 2 strategies to deal with anxiety  Description  Interventions:  - Identify any obvious source/trigger to anxiety  - Staff will assist patient in applying identified coping technique/skills  - Encourage attendance of scheduled groups and activities  Outcome: Progressing     Problem: SLEEP DISTURBANCE  Goal: Will exhibit normal sleeping pattern  Description  Interventions:  -  Assess the patients sleep pattern, noting recent changes  - Administer medication as ordered  - Decrease environmental stimuli, including noise, as appropriate during the night  - Encourage the patient to actively participate in unit groups and or exercise during the day to enhance ability to achieve adequate sleep at night  - Assess the patient, in the morning, encouraging a description of sleep experience  Outcome: Progressing

## 2020-01-21 NOTE — PROGRESS NOTES
Patient pleasant during interaction  Denies SI/HI/AVH currently  Reports having issues with her "alter this morning" but denies now, saying "whatever those medications he prescribed me this morning were, are really helping, I haven't noticed much of my other personality since "  Pt also reports "I am an empathetic psychotic and my alter tries to take away all my empathy, but I have to fight it " Attended groups today, compliant with medications, social with peers  Visible on unit  Cooperative

## 2020-01-21 NOTE — CASE MANAGEMENT
CM completed referral to [employer] [facility] mental health funding  CM added this referral to pt's dc instructions

## 2020-01-21 NOTE — ASSESSMENT & PLAN NOTE
· UDS + methamphetamines, benzodiazepines, THC - patient did receive multiple doses of benzo's prior to arrival to [facility] and collection of UDS  · Counseled cessation in regards to methamphetamines, cannabis abuse

## 2020-01-21 NOTE — ASSESSMENT & PLAN NOTE
· Vital signs stable at time of assessment  · CBC, CMP, TSH WNL  · Lipid panel and hgba1c pending  · Patient appears medically stable at this time for inpatient psychiatric treatment
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
## 2020-01-22 PROCEDURE — 99232 SBSQ HOSP IP/OBS MODERATE 35: CPT | Performed by: PHYSICIAN ASSISTANT

## 2020-01-22 RX ORDER — BENZTROPINE MESYLATE 1 MG/1
1 TABLET ORAL 2 TIMES DAILY
Status: DISCONTINUED | OUTPATIENT
Start: 2020-01-22 | End: 2020-01-27 | Stop reason: HOSPADM

## 2020-01-22 RX ORDER — LORAZEPAM 1 MG/1
1 TABLET ORAL 3 TIMES DAILY
Status: DISCONTINUED | OUTPATIENT
Start: 2020-01-22 | End: 2020-01-26

## 2020-01-22 RX ORDER — LORAZEPAM 1 MG/1
1 TABLET ORAL 2 TIMES DAILY
Status: DISCONTINUED | OUTPATIENT
Start: 2020-01-22 | End: 2020-01-22

## 2020-01-22 RX ORDER — HALOPERIDOL 5 MG
5 TABLET ORAL 2 TIMES DAILY
Status: DISCONTINUED | OUTPATIENT
Start: 2020-01-22 | End: 2020-01-27 | Stop reason: HOSPADM

## 2020-01-22 RX ADMIN — HALOPERIDOL 3 MG: 1 TABLET ORAL at 09:20

## 2020-01-22 RX ADMIN — LORAZEPAM 1 MG: 1 TABLET ORAL at 15:33

## 2020-01-22 RX ADMIN — LORAZEPAM 1 MG: 1 TABLET ORAL at 09:19

## 2020-01-22 RX ADMIN — HALOPERIDOL 5 MG: 5 TABLET ORAL at 17:14

## 2020-01-22 RX ADMIN — BENZTROPINE MESYLATE 1 MG: 1 TABLET ORAL at 17:14

## 2020-01-22 RX ADMIN — PRAZOSIN HYDROCHLORIDE 1 MG: 1 CAPSULE ORAL at 21:51

## 2020-01-22 RX ADMIN — BENZTROPINE MESYLATE 0.5 MG: 0.5 TABLET ORAL at 09:20

## 2020-01-22 RX ADMIN — LORAZEPAM 1 MG: 1 TABLET ORAL at 21:51

## 2020-01-22 RX ADMIN — HYDROXYZINE HYDROCHLORIDE 50 MG: 50 TABLET, FILM COATED ORAL at 11:05

## 2020-01-22 RX ADMIN — NICOTINE POLACRILEX 2 MG: 2 GUM, CHEWING BUCCAL at 17:22

## 2020-01-22 RX ADMIN — QUETIAPINE FUMARATE 100 MG: 100 TABLET ORAL at 21:51
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
## 2020-01-22 NOTE — PROGRESS NOTES
Patient appeared depressed and anxious during interaction, reports "my alter keeps trying to take over but I am not letting her, so that's why she screams sometimes, because she wants to be visible "  Pt denies SI/HI  Reports improved symptoms and sleep as well  Compliant with medications  Recently had a bad phone call, yelled at the person on the other line, and when writer tried to ask pt if she was alright, pt put fingers in her ears, sat on her bed and stopped responding to questions  Writer informed pt that if she needed support or wanted to talk, that she is welcome to do so when ready
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
## 2020-01-22 NOTE — PROGRESS NOTES
Progress Note - Behavioral Health   Quirino Kate 25 y o  female MRN: [id number]  Unit/Bed#: [facility] 260-02 Encounter: [id number]    Assessment/Plan   Principal Problem:    Psychosis ([facility] 75 )  Active Problems:    Severe episode of recurrent major depressive disorder, with psychotic features ([place] [address] )    Medical clearance for psychiatric admission    Polysubstance abuse ([place] [address] )    PCOS (polycystic ovarian syndrome)    PTSD (post-traumatic stress disorder)      Subjective:  Due to low blood pressure last night patient did not get Prazosin  This morning blood pressure remains on lower side  If pressure remains hypotensive discontinue Prazosin and consider Topamax for PTSD related nightmares  Also consider Zoloft for PTSD, anxiety, and depression  Reported last night not having any nightmares  Does say she has flashbacks during the day  Also states she has hypervigilance and does not like when someone touches her shoulder from behind  Reports this morning waking up and feeling like she was in a rage  My PA student spoke to patient and she "screamed bloody murder" in an attempt to stop her alleged alter personality from coming out  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  During interview patient was not agitated or irritable  Did appear mildly anxious with excessive worrying  Does report depressive symptoms have decreased and is denying suicidal thoughts at this time  Denied all forms of hallucinations  Does have paranoid delusions at times believing that certain people are out to harm her  Not currently manic at this time  Medication compliant  Besides borderline hypotension does not appear to be experiencing any adverse side effects        Current Medications:  Current Facility-Administered Medications   Medication Dose Route Frequency    acetaminophen (TYLENOL) tablet 325 mg  325 mg Oral Q6H PRN    acetaminophen (TYLENOL) tablet 650 mg  650 mg Oral Q4H PRN    acetaminophen (TYLENOL) tablet 975 mg  975 mg Oral Q6H PRN    aluminum-magnesium hydroxide-simethicone (MYLANTA) 200-200-20 mg/5 mL oral suspension 30 mL  30 mL Oral Q4H PRN    benztropine (COGENTIN) injection 1 mg  1 mg Intramuscular Q6H PRN    benztropine (COGENTIN) tablet 1 mg  1 mg Oral Q6H PRN    benztropine (COGENTIN) tablet 1 mg  1 mg Oral BID    haloperidol (HALDOL) tablet 5 mg  5 mg Oral Q6H PRN    haloperidol (HALDOL) tablet 5 mg  5 mg Oral BID    haloperidol lactate (HALDOL) injection 5 mg  5 mg Intramuscular Q6H PRN    hydrOXYzine HCL (ATARAX) tablet 25 mg  25 mg Oral Q6H PRN    hydrOXYzine HCL (ATARAX) tablet 50 mg  50 mg Oral TID PRN    LORazepam (ATIVAN) 2 mg/mL injection 2 mg  2 mg Intramuscular Q6H PRN    LORazepam (ATIVAN) tablet 1 mg  1 mg Oral Q6H PRN    LORazepam (ATIVAN) tablet 1 mg  1 mg Oral TID    magnesium hydroxide (MILK OF MAGNESIA) 400 mg/5 mL oral suspension 30 mL  30 mL Oral Daily PRN    nicotine polacrilex (NICORETTE) gum 2 mg  2 mg Oral Q2H PRN    OLANZapine (ZyPREXA) IM injection 10 mg  10 mg Intramuscular Q8H PRN    OLANZapine (ZyPREXA) tablet 10 mg  10 mg Oral Q8H PRN    prazosin (MINIPRESS) capsule 1 mg  1 mg Oral HS    QUEtiapine (SEROquel) tablet 100 mg  100 mg Oral HS    risperiDONE (RisperDAL M-TABS) dispersible tablet 1 mg  1 mg Oral Q3H PRN    traZODone (DESYREL) tablet 150 mg  150 mg Oral HS PRN       Behavioral Health Medications: all current active meds have been reviewed and continue current psychiatric medications  Vitals:  Vitals:    01/22/20 0720   BP: 100/53   Pulse: 62   Resp: 16   Temp: (!) 97 1 °F (36 2 °C)   SpO2:        Laboratory results:    I have personally reviewed all pertinent laboratory/tests results    Most Recent Labs:   Lab Results   Component Value Date    WBC 6 42 01/19/2020    RBC 4 52 01/19/2020    HGB 15 2 01/19/2020    HCT 44 1 01/19/2020     01/19/2020    RDW 12 0 01/19/2020    NEUTROABS 3 38 01/19/2020    SODIUM 137 01/18/2020    K 4 4 01/18/2020     01/18/2020    CO2 26 01/18/2020    BUN 13 01/18/2020    CREATININE 0 98 01/18/2020    GLUC 92 01/18/2020    CALCIUM 9 8 01/18/2020    AST 19 01/18/2020    ALT 24 01/18/2020    ALKPHOS 64 01/18/2020    TP 7 9 01/18/2020    ALB 4 6 01/18/2020    TBILI 0 98 01/18/2020    CHOLESTEROL 141 01/21/2020    HDL 45 01/21/2020    TRIG 49 01/21/2020    LDLCALC 86 01/21/2020    NONHDLC 96 01/21/2020    [id number] 0 810 01/18/2020    FREET4 0 98 01/24/2018    PREGSERUM Negative 01/18/2020    HCGQUANT <2 01/24/2018    RPR Non-Reactive 01/21/2020    HGBA1C 5 2 01/21/2020     01/21/2020       Psychiatric Review of Systems:  Behavior over the last 24 hours:  unchanged  Sleep: normal  Appetite: normal  Medication side effects: Yes, hypotension  ROS: no complaints, all others negative    Mental Status Evaluation:  Appearance:  casually dressed   Behavior:  guarded but cooperative   Speech:  normal pitch and normal volume   Mood:  anxious and less depressed  angry   Affect:  increased in range   Language appropriate   Thought Process:  circumstantial and goal directed   Thought Content:  paranoid delusions   Perceptual Disturbances: None  Alleged alternate personalities   Risk Potential: Denied SI/HI  Potential for aggression: low   Sensorium:  person, place and time/date   Cognition:  grossly intact   Consciousness:  alert and awake    Recent and Remote Memory fair   Attention: attention span and concentration were age appropriate   Insight:  poor   Judgment: improving   Gait/Station: normal gait/station and normal balance   Motor Activity: no abnormal movements     Progress Toward Goals: some progression    Recommended Treatment: Continue with group therapy, milieu therapy and occupational therapy  1   If patient remains hypotensive consider DCing Prazosin  2  Consider decrease in Ativan frequency  3    Consider Topamax or Zoloft for PTSD management if unable to tolerate Prazosin  4  Increase Haldol to 5mg BID for mood stabilization  5  Increase Cogentin to 1mg BID for EPS prevention    Risks, benefits and possible side effects of Medications:   Risks, benefits, and possible side effects of medications explained to patient and patient verbalizes understanding        Rodolfo Orellana PA-C

## 2020-01-22 NOTE — PROGRESS NOTES
01/22/20 0759   Team Meeting   Meeting Type Daily Rounds   Initial Conference Date 01/21/20   Team Members Present   Team Members Present Physician;Nurse;;Occupational Therapist   Physician Team Member Dr Kandy Queen PA-C   Nursing Team Member [facility] Management Team Member Caryn/ [address]   OT Team Member Elisa   Patient/Family Present   Patient Present No   Patient's Family Present No     Pt denies SI  Good visit with old co worker last night  Pt did not get minipress due to parameter issues  100/53 HR is 62  Already showered this morning  Pt is on Haldol- 3 mg BID and cogentin and Ativan 1 mg x 3 and Seroquel at night  Pt will be referred to [facility] due to lack of insurance

## 2020-01-22 NOTE — PROGRESS NOTES
01/22/20 0913   Team Meeting   Meeting Type Tx Team Meeting   Team Members Present   Team Members Present Physician;Nurse;   Physician Team Member Dr Luba Naqvi Team Member [facility] Management Team Member Caryn   Patient/Family Present   Patient Present Yes   Patient's Family Present No     TX team discussed diagnosis of psychosis  Md discussed rationale for medication- Haldol, Cogentin, Seroquel and Ativan  CM discussed plan to refer pt to [facility] mental health funding due to no insurance  Pt identifies step mother and cousin as great supports and a desire to get better as her primary strengths

## 2020-01-22 NOTE — PROGRESS NOTES
Pt requested and received PRN Atarax at 1105 for elevated anxiety  Pleasant during interaction  Appears to have been effective, pt attended group and and lunch

## 2020-01-23 PROCEDURE — 99232 SBSQ HOSP IP/OBS MODERATE 35: CPT | Performed by: PHYSICIAN ASSISTANT

## 2020-01-23 RX ADMIN — BENZTROPINE MESYLATE 1 MG: 1 TABLET ORAL at 08:02

## 2020-01-23 RX ADMIN — BENZTROPINE MESYLATE 1 MG: 1 TABLET ORAL at 17:01

## 2020-01-23 RX ADMIN — HALOPERIDOL 5 MG: 5 TABLET ORAL at 17:01

## 2020-01-23 RX ADMIN — HALOPERIDOL 5 MG: 5 TABLET ORAL at 08:02

## 2020-01-23 RX ADMIN — NICOTINE POLACRILEX 2 MG: 2 GUM, CHEWING BUCCAL at 09:31

## 2020-01-23 RX ADMIN — PRAZOSIN HYDROCHLORIDE 1 MG: 1 CAPSULE ORAL at 21:40

## 2020-01-23 RX ADMIN — LORAZEPAM 1 MG: 1 TABLET ORAL at 22:18

## 2020-01-23 RX ADMIN — LORAZEPAM 1 MG: 1 TABLET ORAL at 08:02

## 2020-01-23 RX ADMIN — LORAZEPAM 1 MG: 1 TABLET ORAL at 16:52

## 2020-01-23 RX ADMIN — LORAZEPAM 1 MG: 1 TABLET ORAL at 21:40

## 2020-01-23 RX ADMIN — HYDROXYZINE HYDROCHLORIDE 50 MG: 50 TABLET, FILM COATED ORAL at 09:31

## 2020-01-23 RX ADMIN — QUETIAPINE FUMARATE 100 MG: 100 TABLET ORAL at 21:41

## 2020-01-23 RX ADMIN — NICOTINE POLACRILEX 2 MG: 2 GUM, CHEWING BUCCAL at 16:53

## 2020-01-23 NOTE — PROGRESS NOTES
Progress Note - Behavioral Health   Jeimy Moe 25 y o  female MRN: [id number]  Unit/Bed#: [facility] 260-02 Encounter: [id number]    Assessment/Plan   Principal Problem:    Psychosis ([facility] 75 )  Active Problems:    Severe episode of recurrent major depressive disorder, with psychotic features ([place] [address] )    Medical clearance for psychiatric admission    Polysubstance abuse ([place] [address] )    PCOS (polycystic ovarian syndrome)    PTSD (post-traumatic stress disorder)      Subjective:  Patient got Prazosin last night and denied having any nightmares or flashbacks  Reports sleeping well for the first time in a long time  Reports improved mood with feeling less depressed and anxious  Improvements in appetite, energy, and concentration  Appears more forward thinking  Anxious about not having insurance and ability to afford medications  Also spoke about future follow up  Are reports of paranoia but did not appear suspicious of interviewer  Does have loose associations at times  Denied hallucinations today  Does report inner rage but states she is controlling it better since being on Haldol  Also claims to have 2 additional personalities and speaks in 2rd person at times  Does not appear to be actively manic and had no episodes of screaming today  Medication compliant  Appears to be tolerating medications well without serious side effects      Current Medications:  Current Facility-Administered Medications   Medication Dose Route Frequency    acetaminophen (TYLENOL) tablet 325 mg  325 mg Oral Q6H PRN    acetaminophen (TYLENOL) tablet 650 mg  650 mg Oral Q4H PRN    acetaminophen (TYLENOL) tablet 975 mg  975 mg Oral Q6H PRN    aluminum-magnesium hydroxide-simethicone (MYLANTA) 200-200-20 mg/5 mL oral suspension 30 mL  30 mL Oral Q4H PRN    benztropine (COGENTIN) injection 1 mg  1 mg Intramuscular Q6H PRN    benztropine (COGENTIN) tablet 1 mg  1 mg Oral Q6H PRN    benztropine (COGENTIN) tablet 1 mg  1 mg Oral BID    haloperidol (HALDOL) tablet 5 mg  5 mg Oral Q6H PRN    haloperidol (HALDOL) tablet 5 mg  5 mg Oral BID    haloperidol lactate (HALDOL) injection 5 mg  5 mg Intramuscular Q6H PRN    hydrOXYzine HCL (ATARAX) tablet 25 mg  25 mg Oral Q6H PRN    hydrOXYzine HCL (ATARAX) tablet 50 mg  50 mg Oral TID PRN    LORazepam (ATIVAN) 2 mg/mL injection 2 mg  2 mg Intramuscular Q6H PRN    LORazepam (ATIVAN) tablet 1 mg  1 mg Oral Q6H PRN    LORazepam (ATIVAN) tablet 1 mg  1 mg Oral TID    magnesium hydroxide (MILK OF MAGNESIA) 400 mg/5 mL oral suspension 30 mL  30 mL Oral Daily PRN    nicotine polacrilex (NICORETTE) gum 2 mg  2 mg Oral Q2H PRN    OLANZapine (ZyPREXA) IM injection 10 mg  10 mg Intramuscular Q8H PRN    OLANZapine (ZyPREXA) tablet 10 mg  10 mg Oral Q8H PRN    prazosin (MINIPRESS) capsule 1 mg  1 mg Oral HS    QUEtiapine (SEROquel) tablet 100 mg  100 mg Oral HS    risperiDONE (RisperDAL M-TABS) dispersible tablet 1 mg  1 mg Oral Q3H PRN    traZODone (DESYREL) tablet 150 mg  150 mg Oral HS PRN       Behavioral Health Medications: all current active meds have been reviewed and continue current psychiatric medications  Vitals:  Vitals:    01/23/20 0729   BP: 118/72   Pulse: 102   Resp: 20   Temp: 97 6 °F (36 4 °C)   SpO2:        Laboratory results:    I have personally reviewed all pertinent laboratory/tests results    Most Recent Labs:   Lab Results   Component Value Date    WBC 6 42 01/19/2020    RBC 4 52 01/19/2020    HGB 15 2 01/19/2020    HCT 44 1 01/19/2020     01/19/2020    RDW 12 0 01/19/2020    NEUTROABS 3 38 01/19/2020    SODIUM 137 01/18/2020    K 4 4 01/18/2020     01/18/2020    CO2 26 01/18/2020    BUN 13 01/18/2020    CREATININE 0 98 01/18/2020    GLUC 92 01/18/2020    CALCIUM 9 8 01/18/2020    AST 19 01/18/2020    ALT 24 01/18/2020    ALKPHOS 64 01/18/2020    TP 7 9 01/18/2020    ALB 4 6 01/18/2020    TBILI 0 98 01/18/2020    CHOLESTEROL 141 01/21/2020    HDL 45 01/21/2020    TRIG 49 01/21/2020    LDLCALC 86 01/21/2020    NONHDLC 96 01/21/2020    [id number] 0 810 01/18/2020    FREET4 0 98 01/24/2018    PREGSERUM Negative 01/18/2020    HCGQUANT <2 01/24/2018    RPR Non-Reactive 01/21/2020    HGBA1C 5 2 01/21/2020     01/21/2020       Psychiatric Review of Systems:  Behavior over the last 24 hours:  improved  Sleep: normal  Appetite: normal  Medication side effects: No  ROS: no complaints, all others negative    Mental Status Evaluation:  Appearance:  casually dressed   Behavior:  less guared, cooperative   Speech:  normal pitch and normal volume   Mood:  less depressed and anxious   Affect:  brighter   Language appropriate   Thought Process:  circumstantial and goal directed   Thought Content:  obsessions and less paranoid  Reports multiple personalities   Perceptual Disturbances: None  Risk Potential: Denied SI/HI  Potential for aggression: possible   Sensorium:  person, place and time/date   Cognition:  grossly intact   Consciousness:  alert and awake    Recent and Remote Memory fair   Attention: attention span appeared shorter than expected for age   Insight:  improving   Judgment: improving   Gait/Station: normal gait/station and normal balance   Motor Activity: no abnormal movements     Progress Toward Goals: progressing    Recommended Treatment: Continue with group therapy, milieu therapy and occupational therapy  1   Continue current medications  2  Consider decrease in Ativan dosing  3  Disposition planning     Risks, benefits and possible side effects of Medications:   Risks, benefits, and possible side effects of medications explained to patient and patient verbalizes understanding        Norma Nagel PA-C

## 2020-01-23 NOTE — PROGRESS NOTES
01/23/20 0720   Team Meeting   Meeting Type Daily Rounds   Team Members Present   Team Members Present Physician;Nurse;;Occupational Therapist   Physician Team Member Dr Coty Delgado PA-C   Nursing Team Member PENNY [facility] Management Team Member Caryn/ [address]   OT Team Member Elisa   Patient/Family Present   Patient Present No   Patient's Family Present No     Piercing scream yesterday morning  Pt reports this is because one of her voices is telling her not to take her medication  Pt reports that she feels paranoia towards white males  Pt did well for the rest of the day  Pt was upset on the phone last night  Pt received Atarax PRN during the day  Pt slept well  Woke up once, but went straight back to sleep  Haldol is on 5 mg now

## 2020-01-23 NOTE — PROGRESS NOTES
Cooperative throughout the day  Attending groups and social with roommate  States "I don't think I screamed at all today"  RN confirmed no scream were heard and no conversations with alters were noted  Inquired about signing 72 hour notice but will decide any plans until Friday  Also states she informed CM she has no money to pay for medications

## 2020-01-23 NOTE — PROGRESS NOTES
Pt slept until around 2am Was awake when roommate was up however was able to return to sleep soon after and slept remainder of the night

## 2020-01-24 PROCEDURE — 99232 SBSQ HOSP IP/OBS MODERATE 35: CPT | Performed by: PHYSICIAN ASSISTANT

## 2020-01-24 RX ORDER — XYLITOL/YERBA SANTA
5 AEROSOL, SPRAY WITH PUMP (ML) MUCOUS MEMBRANE 4 TIMES DAILY PRN
Status: DISCONTINUED | OUTPATIENT
Start: 2020-01-24 | End: 2020-01-27 | Stop reason: HOSPADM

## 2020-01-24 RX ADMIN — LORAZEPAM 1 MG: 1 TABLET ORAL at 16:19

## 2020-01-24 RX ADMIN — LORAZEPAM 1 MG: 1 TABLET ORAL at 08:09

## 2020-01-24 RX ADMIN — BENZTROPINE MESYLATE 1 MG: 1 TABLET ORAL at 17:23

## 2020-01-24 RX ADMIN — HALOPERIDOL 5 MG: 5 TABLET ORAL at 17:23

## 2020-01-24 RX ADMIN — LORAZEPAM 1 MG: 1 TABLET ORAL at 21:39

## 2020-01-24 RX ADMIN — BENZTROPINE MESYLATE 1 MG: 1 TABLET ORAL at 08:09

## 2020-01-24 RX ADMIN — NICOTINE POLACRILEX 2 MG: 2 GUM, CHEWING BUCCAL at 16:21

## 2020-01-24 RX ADMIN — PRAZOSIN HYDROCHLORIDE 1 MG: 1 CAPSULE ORAL at 21:41

## 2020-01-24 RX ADMIN — QUETIAPINE FUMARATE 100 MG: 100 TABLET ORAL at 21:39

## 2020-01-24 RX ADMIN — HALOPERIDOL 5 MG: 5 TABLET ORAL at 08:09

## 2020-01-24 NOTE — PROGRESS NOTES
Pt's HR remains at 120 despite taking scheduled Ativan at 8am  Currently denies feeling anxious, says it's more of an inner rage  Says she is able to control it  MD made aware and informed that pt would like to meet and discuss further

## 2020-01-24 NOTE — PROGRESS NOTES
Progress Note - Behavioral Health   Stephanie Smith 25 y o  female MRN: [id number]  Unit/Bed#: [facility] 260-02 Encounter: [id number]    Assessment/Plan   Principal Problem:    Psychosis ([facility] 75 )  Active Problems:    Severe episode of recurrent major depressive disorder, with psychotic features ([facility] 75 )    Medical clearance for psychiatric admission    Polysubstance abuse ([place] [address] )    PCOS (polycystic ovarian syndrome)    PTSD (post-traumatic stress disorder)      Subjective:  Patient today reported waking up with a feeling of inner range  States she is able to have more of a filter over this and had no episodes of screaming  Does say she believes she has a voice in the back of her brain telling her to do things  She states he tries to quiet the alleged additional personality from coming out  Overall was cooperative and pleasant during interview  Denied hearing any voices or having additional forms of hallucinations  Did not appear paranoid  Did report paranoia the other day  States depressive symptoms are low and is denying suicidal thoughts  Does report increased anxiety in regards to follow-up care and inability to afford medications  States she often worries about this  Does seem to focus on things and sometimes obsesses  Not actively manic  No agitation during interview  Denied PTSD related symptoms  Medication compliant  Did have elevated heart rate today but that was during period suppressing her in a rage  Appears to be tolerating medications well without serious side effects        Current Medications:  Current Facility-Administered Medications   Medication Dose Route Frequency    acetaminophen (TYLENOL) tablet 325 mg  325 mg Oral Q6H PRN    acetaminophen (TYLENOL) tablet 650 mg  650 mg Oral Q4H PRN    acetaminophen (TYLENOL) tablet 975 mg  975 mg Oral Q6H PRN    aluminum-magnesium hydroxide-simethicone (MYLANTA) 200-200-20 mg/5 mL oral suspension 30 mL  30 mL Oral Q4H PRN    benztropine (COGENTIN) injection 1 mg  1 mg Intramuscular Q6H PRN    benztropine (COGENTIN) tablet 1 mg  1 mg Oral Q6H PRN    benztropine (COGENTIN) tablet 1 mg  1 mg Oral BID    haloperidol (HALDOL) tablet 5 mg  5 mg Oral Q6H PRN    haloperidol (HALDOL) tablet 5 mg  5 mg Oral BID    haloperidol lactate (HALDOL) injection 5 mg  5 mg Intramuscular Q6H PRN    hydrOXYzine HCL (ATARAX) tablet 25 mg  25 mg Oral Q6H PRN    hydrOXYzine HCL (ATARAX) tablet 50 mg  50 mg Oral TID PRN    LORazepam (ATIVAN) 2 mg/mL injection 2 mg  2 mg Intramuscular Q6H PRN    LORazepam (ATIVAN) tablet 1 mg  1 mg Oral Q6H PRN    LORazepam (ATIVAN) tablet 1 mg  1 mg Oral TID    magnesium hydroxide (MILK OF MAGNESIA) 400 mg/5 mL oral suspension 30 mL  30 mL Oral Daily PRN    nicotine polacrilex (NICORETTE) gum 2 mg  2 mg Oral Q2H PRN    OLANZapine (ZyPREXA) IM injection 10 mg  10 mg Intramuscular Q8H PRN    OLANZapine (ZyPREXA) tablet 10 mg  10 mg Oral Q8H PRN    prazosin (MINIPRESS) capsule 1 mg  1 mg Oral HS    QUEtiapine (SEROquel) tablet 100 mg  100 mg Oral HS    risperiDONE (RisperDAL M-TABS) dispersible tablet 1 mg  1 mg Oral Q3H PRN    saliva substitute (MOUTH KOTE) mucosal solution 5 spray  5 spray Mouth/Throat 4x Daily PRN    traZODone (DESYREL) tablet 150 mg  150 mg Oral HS PRN       Behavioral Health Medications: all current active meds have been reviewed and continue current psychiatric medications  Vitals:  Vitals:    01/24/20 0923   BP:    Pulse: (!) 120   Resp:    Temp:    SpO2:        Laboratory results:    I have personally reviewed all pertinent laboratory/tests results    Most Recent Labs:   Lab Results   Component Value Date    WBC 6 42 01/19/2020    RBC 4 52 01/19/2020    HGB 15 2 01/19/2020    HCT 44 1 01/19/2020     01/19/2020    RDW 12 0 01/19/2020    NEUTROABS 3 38 01/19/2020    SODIUM 137 01/18/2020    K 4 4 01/18/2020     01/18/2020    CO2 26 01/18/2020    BUN 13 01/18/2020    CREATININE 0 98 01/18/2020 GLUC 92 01/18/2020    CALCIUM 9 8 01/18/2020    AST 19 01/18/2020    ALT 24 01/18/2020    ALKPHOS 64 01/18/2020    TP 7 9 01/18/2020    ALB 4 6 01/18/2020    TBILI 0 98 01/18/2020    CHOLESTEROL 141 01/21/2020    HDL 45 01/21/2020    TRIG 49 01/21/2020    LDLCALC 86 01/21/2020    NONHDLC 96 01/21/2020    ZBH5FCRXSEAU 0 810 01/18/2020    FREET4 0 98 01/24/2018    PREGSERUM Negative 01/18/2020    HCGQUANT <2 01/24/2018    RPR Non-Reactive 01/21/2020    HGBA1C 5 2 01/21/2020     01/21/2020       Psychiatric Review of Systems:  Behavior over the last 24 hours:  Some improvement  Sleep: normal  Appetite: normal  Medication side effects: Yes dry mouth  ROS: no complaints, all others negative    Mental Status Evaluation:  Appearance:  casually dressed   Behavior:  less guarded, cooperative   Speech:  normal pitch and normal volume   Mood:  anxious   Affect:  mood-congruent   Language appropriate   Thought Process:  goal directed and linear   Thought Content:  alleged multiple personalities   Perceptual Disturbances: None   Risk Potential: Denied SI/HI  Potential for aggression: low   Sensorium:  person, place and time/date   Cognition:  grossly intact   Consciousness:  alert and awake    Recent and Remote Memory fair   Attention: attention span appeared shorter than expected for age   Insight:  partial   Judgment: improving   Gait/Station: normal gait/station and normal balance   Motor Activity: no abnormal movements     Progress Toward Goals: progressing    Recommended Treatment: Continue with group therapy, milieu therapy and occupational therapy  1   Consider making Ativan 1mg BID due to risk of dependency  2  Add Mouth Kote for dry mouth  3  Continue other medications  4  Disposition planning    Risks, benefits and possible side effects of Medications:   Risks, benefits, and possible side effects of medications explained to patient and patient verbalizes understanding        Chandana Mercado PA-C

## 2020-01-24 NOTE — PROGRESS NOTES
01/24/20 0802   Team Meeting   Meeting Type Daily Rounds   Team Members Present   Team Members Present Physician;Nurse;;Occupational Therapist   Physician Team Member Dr Pamelia Holter PA-C   Nursing Team Member [facility] Management Team Member Caryn/ [address]   OT Team Member Elisa   Patient/Family Present   Patient Present No   Patient's Family Present No       Denying SI  Feeling more control over inner rage  Pt reports that medications are effective  Pt received social security form  Pt did received PRN for anxiety yesterday

## 2020-01-24 NOTE — CASE MANAGEMENT
CM referred pt to [facility] for mental health intake  Planning for dc on Monday  Pt reports that stepmother is aware and she will pick pt up at 10 in the morning

## 2020-01-24 NOTE — PROGRESS NOTES
Pt is pleasant and polite during interaction  Pt states her HR (120 bpm) is elevated due to anxiety which she always has in the morning  Denies feeling like her heart is racing  Due to receive scheduled Ativan with her AM meds and is confident it will be effective for anxiety reduction  Denies SI/AVH  Compliant with meds and reports feeling good with current med regimen  States her alter doesn't like when she takes her meds and her alter will growl  Denies having any problems, questions or concerns at this time

## 2020-01-24 NOTE — PROGRESS NOTES
Patient pleasant, scant in conversation today  Denied SI/HI/AVH  Reports feeling "constant inner rage, but no urge to scream today "  Patient reported medications are helpful in reducing her "alters presence" and controlling the screaming behavior  Attended groups, compliant with meds  Social in dayroom at times with peers  Denied any concerns

## 2020-01-24 NOTE — PROGRESS NOTES
Pt woke briefly overnight however was able to return to sleep soon after  Slept majority of the night without difficulty

## 2020-01-24 NOTE — PROGRESS NOTES
Patient awake at the nurses station asking for ensure stating she wants to go to sleep  This writer spoke with patient in her room assessing her need for snack  Patient tearful states she is hungry  Patient BMI 18 4 on admission, patient has ensure on order for breakfast, lunch, dinner  Ensure was given to patient and she was polite and appreciative  Will continue to give support

## 2020-01-25 PROCEDURE — 99231 SBSQ HOSP IP/OBS SF/LOW 25: CPT | Performed by: PSYCHIATRY & NEUROLOGY

## 2020-01-25 RX ADMIN — HYDROXYZINE HYDROCHLORIDE 50 MG: 50 TABLET, FILM COATED ORAL at 04:35

## 2020-01-25 RX ADMIN — BENZTROPINE MESYLATE 1 MG: 1 TABLET ORAL at 08:55

## 2020-01-25 RX ADMIN — LORAZEPAM 1 MG: 1 TABLET ORAL at 20:52

## 2020-01-25 RX ADMIN — LORAZEPAM 1 MG: 1 TABLET ORAL at 16:28

## 2020-01-25 RX ADMIN — HALOPERIDOL 5 MG: 5 TABLET ORAL at 18:02

## 2020-01-25 RX ADMIN — PRAZOSIN HYDROCHLORIDE 1 MG: 1 CAPSULE ORAL at 21:30

## 2020-01-25 RX ADMIN — QUETIAPINE FUMARATE 150 MG: 100 TABLET ORAL at 21:31

## 2020-01-25 RX ADMIN — LORAZEPAM 1 MG: 1 TABLET ORAL at 08:55

## 2020-01-25 RX ADMIN — BENZTROPINE MESYLATE 1 MG: 1 TABLET ORAL at 18:02

## 2020-01-25 RX ADMIN — HYDROXYZINE HYDROCHLORIDE 50 MG: 50 TABLET, FILM COATED ORAL at 12:47

## 2020-01-25 RX ADMIN — HALOPERIDOL 5 MG: 5 TABLET ORAL at 08:55

## 2020-01-25 NOTE — PROGRESS NOTES
Patient slept most of the night without distress  She woke up early morning and was at the nurses station at [facility] reports having increased anxiety feeling it in her stomach and recognizes this as a sign that will start her "inner rage "  Patient sitting on the floor outside of the nurses station with depressed affect, calm and polite  Patient requesting prn atarax  She states she is able to go to sleep at night but has a difficult time staying asleep  She reports she will talk with the MD later in the morning to discuss increasing her seroquel  Patient received atarax 50 mg and returned to her room where she is currently in bed resting  Will continue to monitor and follow up with effectiveness of medication

## 2020-01-25 NOTE — PROGRESS NOTES
Patient appears to have slept without distress after having atarax 50 mg at 0435  Patient woke up at 0545and spoke with staff member requesting ensure stating she was hungry  Patient was reminded she receives ensure with breakfast at 0830  Patient was at the nurses station at [phone]  reporting she was hungry requesting an apple  She was reminded breakfast was at 0830 and there were no snacks available at this time of the day  Patient stating it was not fair, she was hungry as she walked away and returned to her room for a short period of time then was observed walking the halls  Will continue to monitor and give support

## 2020-01-25 NOTE — PROGRESS NOTES
Pt reports doing well overall but reports a few hours in the evening where her agitation increases  Says the scheduled Haldol will help get her until she can get her bedtime meds  Pt hopeful the increase in Seroquel will allow her to sleep through the night

## 2020-01-25 NOTE — PROGRESS NOTES
Pt is pleasant during interaction  Denies anxiety but reports inner rage  Pt reports doing a lot of self reflection since being here and feels like a weight has been lifted  Pt is good with plan to increase hs Seroquel

## 2020-01-25 NOTE — PROGRESS NOTES
Patient pleasant during interaction, reports no "inner rage or alters today "  Denies SI/HI/AVH  Reports improved sleep after being re-started on medications  Attended most groups today  Appropriate on unit, denies any needs at this time  Did nap this evening, however up and OOB for snack and meds

## 2020-01-25 NOTE — PLAN OF CARE
Problem: DEPRESSION  Goal: Will be euthymic at discharge  Description  INTERVENTIONS:  - Administer medication as ordered  - Provide emotional support via 1:1 interaction with staff  - Encourage involvement in milieu/groups/activities  - Monitor for social isolation  Outcome: Progressing     Problem: PSYCHOSIS  Goal: Will report no hallucinations or delusions  Description  Interventions:  - Administer medication as  ordered  - Every waking shifts and PRN assess for the presence of hallucinations and or delusions  - Assist with reality testing to support increasing orientation  - Assess if patient's hallucinations or delusions are encouraging self-harm or harm to others and intervene as appropriate  Outcome: Progressing     Problem: ANXIETY  Goal: Will report anxiety at manageable levels  Description  INTERVENTIONS:  - Administer medication as ordered  - Teach and encourage coping skills  - Provide emotional support  - Assess patient/family for anxiety and ability to cope  Outcome: Progressing  Goal: By discharge: Patient will verbalize 2 strategies to deal with anxiety  Description  Interventions:  - Identify any obvious source/trigger to anxiety  - Staff will assist patient in applying identified coping technique/skills  - Encourage attendance of scheduled groups and activities  Outcome: Progressing     Problem: SLEEP DISTURBANCE  Goal: Will exhibit normal sleeping pattern  Description  Interventions:  -  Assess the patients sleep pattern, noting recent changes  - Administer medication as ordered  - Decrease environmental stimuli, including noise, as appropriate during the night  - Encourage the patient to actively participate in unit groups and or exercise during the day to enhance ability to achieve adequate sleep at night  - Assess the patient, in the morning, encouraging a description of sleep experience  Outcome: Progressing     Problem: Ineffective Coping  Goal: Participates in unit activities  Description  Interventions:  - Provide therapeutic environment   - Provide required programming   - Redirect inappropriate behaviors   Outcome: Progressing     Problem: Nutrition/Hydration-ADULT  Goal: Nutrient/Hydration intake appropriate for improving, restoring or maintaining nutritional needs  Description  Monitor and assess patient's nutrition/hydration status for malnutrition  Collaborate with interdisciplinary team and initiate plan and interventions as ordered  Monitor patient's weight and dietary intake as ordered or per policy  Utilize nutrition screening tool and intervene as necessary  Determine patient's food preferences and provide high-protein, high-caloric foods as appropriate       INTERVENTIONS:  - Monitor oral intake, urinary output, labs, and treatment plans  - Assess nutrition and hydration status and recommend course of action  - Evaluate amount of meals eaten  - Assist patient with eating if necessary   - Allow adequate time for meals  - Recommend/ encourage appropriate diets, oral nutritional supplements, and vitamin/mineral supplements  - Order, calculate, and assess calorie counts as needed  - Recommend, monitor, and adjust tube feedings and TPN/PPN based on assessed needs  - Assess need for intravenous fluids  - Provide specific nutrition/hydration education as appropriate  - Include patient/family/caregiver in decisions related to nutrition  Outcome: Progressing

## 2020-01-25 NOTE — PROGRESS NOTES
AM Rounds:    Meds helping to reduce alters from surfacing  Denies SI  Anxious in AM with tachycardia  Reported inner rage later in day due to alter  PRN Atarax

## 2020-01-25 NOTE — PROGRESS NOTES
Progress Note - Behavioral Health   Yosef Bazan 25 y o  female MRN: [id number]  Unit/Bed#: [facility] 260-02 Encounter: [id number]    The patient was seen for continuing care and reviewed with treatment team     Current Mental Status Evaluation:  Appearance:  Adequate hygiene and grooming   Behavior:  calm and cooperative   Mood:  euthymic   Affect: mood-congruent   Speech: Normal rate and Normal volume   Thought Process:  Goal directed and coherent   Thought Content:  Discusses her alters   Perceptual Disturbances: Denies hallucinations and does not appear to be responding to internal stimuli   Risk Potential: No suicidal or homicidal ideation   Orientation:   Person, place, time     Progress Toward Goals: Patient pleasant, states she has not needed to use the "primal scream" to help redirect her alter  Asks about increasing her Haldol and we discuss risk of EPS and TD  Will try increase Seroquel for her insomnia and racing thoughts upon waking  Excited to attend outpatient and wants to cont her medications  Will increase seroquel tonight and discuss reduction in ativan tmw  Principal Problem:    Psychosis ([facility] 75 )  Active Problems:    Severe episode of recurrent major depressive disorder, with psychotic features ([facility] 75 )    Medical clearance for psychiatric admission    Polysubstance abuse ([facility] 75 )    PCOS (polycystic ovarian syndrome)    PTSD (post-traumatic stress disorder)      Recommended Treatment: Continue with pharmacotherapy, group therapy, milieu therapy and occupational therapy    The patient will be maintained on the following medications:    Current Facility-Administered Medications:  acetaminophen 325 mg Oral Q6H PRN Volanda Grit, PA-C   acetaminophen 650 mg Oral Q4H PRN Volanda Grit, PA-C   acetaminophen 975 mg Oral Q6H PRN Volanda Grit, PA-C   aluminum-magnesium hydroxide-simethicone 30 mL Oral Q4H PRN Volanda Grit, PA-C   benztropine 1 mg Intramuscular Q6H PRN Ana De La O, PA-BECCA benztropine 1 mg Oral Q6H PRN Krystin Eye, PA-C   benztropine 1 mg Oral BID Ginette MD Slim   haloperidol 5 mg Oral Q6H PRN Krystin Eye, PA-C   haloperidol 5 mg Oral BID Krystin Eye, PA-C   haloperidol lactate 5 mg Intramuscular Q6H PRN Krystin Eye, PA-C   hydrOXYzine HCL 25 mg Oral Q6H PRN Krystin Eye, PA-C   hydrOXYzine HCL 50 mg Oral TID PRN Ginette MD Slim   LORazepam 2 mg Intramuscular Q6H PRN Krystin Eye, PA-C   LORazepam 1 mg Oral Q6H PRN Krystin Eye, PA-C   LORazepam 1 mg Oral TID Ginette MD Slim   magnesium hydroxide 30 mL Oral Daily PRN Krystin Eye, PA-C   nicotine polacrilex 2 mg Oral Q2H PRN Krystin Eye, PA-C   OLANZapine 10 mg Intramuscular Q8H PRN Krystin Eye, PA-C   OLANZapine 10 mg Oral Q8H PRN Krystin Eye, PA-C   prazosin 1 mg Oral HS Ginette Smalls MD   QUEtiapine 150 mg Oral HS Amara Marshall MD   risperiDONE 1 mg Oral Q3H PRN Krystin Eye, PA-C   saliva substitute 5 spray Mouth/Throat 4x Daily PRN Krystin Eye, PA-C   traZODone 150 mg Oral HS PRN Ginetteanayeli Smalls MD

## 2020-01-26 PROCEDURE — 99231 SBSQ HOSP IP/OBS SF/LOW 25: CPT | Performed by: PSYCHIATRY & NEUROLOGY

## 2020-01-26 RX ORDER — LORAZEPAM 1 MG/1
1 TABLET ORAL 2 TIMES DAILY
Status: DISCONTINUED | OUTPATIENT
Start: 2020-01-26 | End: 2020-01-27 | Stop reason: HOSPADM

## 2020-01-26 RX ADMIN — BENZTROPINE MESYLATE 1 MG: 1 TABLET ORAL at 17:01

## 2020-01-26 RX ADMIN — HYDROXYZINE HYDROCHLORIDE 50 MG: 50 TABLET, FILM COATED ORAL at 12:02

## 2020-01-26 RX ADMIN — QUETIAPINE FUMARATE 150 MG: 100 TABLET ORAL at 21:49

## 2020-01-26 RX ADMIN — BENZTROPINE MESYLATE 1 MG: 1 TABLET ORAL at 08:26

## 2020-01-26 RX ADMIN — LORAZEPAM 1 MG: 1 TABLET ORAL at 17:01

## 2020-01-26 RX ADMIN — HALOPERIDOL 5 MG: 5 TABLET ORAL at 08:26

## 2020-01-26 RX ADMIN — NICOTINE POLACRILEX 2 MG: 2 GUM, CHEWING BUCCAL at 17:02

## 2020-01-26 RX ADMIN — HYDROXYZINE HYDROCHLORIDE 25 MG: 25 TABLET, FILM COATED ORAL at 15:58

## 2020-01-26 RX ADMIN — LORAZEPAM 1 MG: 1 TABLET ORAL at 08:26

## 2020-01-26 RX ADMIN — HALOPERIDOL 5 MG: 5 TABLET ORAL at 17:01

## 2020-01-26 RX ADMIN — PRAZOSIN HYDROCHLORIDE 1 MG: 1 CAPSULE ORAL at 21:49

## 2020-01-26 NOTE — PROGRESS NOTES
Progress Note - Behavioral Health   Damián Gunn 25 y o  female MRN: [id number]  Unit/Bed#: [facility]-02 Encounter: [id number]    The patient was seen for continuing care and reviewed with treatment team     Current Mental Status Evaluation:  Appearance:  Adequate hygiene and grooming   Behavior:  calm, cooperative and friendly   Mood:  euthymic   Affect: mood-congruent   Speech: Normal rate and Normal volume   Thought Process:  Circumstantial   Thought Content:  Obsessions   Perceptual Disturbances: Denies AH now, will have AH telling her to not take medications from her alter   Risk Potential: No suicidal or homicidal ideation   Orientation:   Person, place, situation     Progress Toward Goals: Patient friendly with this writer, we discuss medications and again the haldol dose will not be adjusted due to concern for EPS  She reports sleeping better than she ever has and is looking forward to d/c and meeting her new treatment team  Still feels "inner rage" in the AM but is able to manage that  Will begin Ativan 1mg BID today  Principal Problem:    Psychosis ([facility] 75 )  Active Problems:    Severe episode of recurrent major depressive disorder, with psychotic features ([facility] 75 )    Medical clearance for psychiatric admission    Polysubstance abuse ([facility] 75 )    PCOS (polycystic ovarian syndrome)    PTSD (post-traumatic stress disorder)      Recommended Treatment: Continue with pharmacotherapy, group therapy, milieu therapy and occupational therapy    The patient will be maintained on the following medications:    Current Facility-Administered Medications:  acetaminophen 325 mg Oral Q6H PRN Felipa See, PA-C   acetaminophen 650 mg Oral Q4H PRN Felipa See, PA-C   acetaminophen 975 mg Oral Q6H PRN Felipa See PA-C   aluminum-magnesium hydroxide-simethicone 30 mL Oral Q4H PRN Felipa See PA-BECCA   benztropine 1 mg Intramuscular Q6H PRN Felipa See, PA-BECCA   benztropine 1 mg Oral Q6H PRN Felipa See PAHARSH benztropine 1 mg Oral BID Lee Ann Lopes MD   haloperidol 5 mg Oral Q6H PRN Crow Alfaro PA-C   haloperidol 5 mg Oral BID Crow Alfaro PA-C   haloperidol lactate 5 mg Intramuscular Q6H PRN Crow Alfaro PA-C   hydrOXYzine HCL 25 mg Oral Q6H PRN Crow Alfaro PA-C   hydrOXYzine HCL 50 mg Oral TID PRN Lee Ann Lopes MD   LORazepam 2 mg Intramuscular Q6H PRN Crow Alfaro PA-C   LORazepam 1 mg Oral Q6H PRN Crow Alfaro PA-C   LORazepam 1 mg Oral TID Lee Ann Lopes MD   magnesium hydroxide 30 mL Oral Daily PRN Crow Alfaro PA-C   nicotine polacrilex 2 mg Oral Q2H PRN Crow Alfaro PA-C   OLANZapine 10 mg Intramuscular Q8H PRN Crow Alfaro PA-C   OLANZapine 10 mg Oral Q8H PRN Crow Alfaro PA-C   prazosin 1 mg Oral HS Lee Ann Lopes MD   QUEtiapine 150 mg Oral HS Lawanda Medellin MD   risperiDONE 1 mg Oral Q3H PRN Crow Alfaro PA-C   saliva substitute 5 spray Mouth/Throat 4x Daily PRN Crow Alfaro PA-C   traZODone 150 mg Oral HS PRN Lee Ann Lopes MD

## 2020-01-27 VITALS
DIASTOLIC BLOOD PRESSURE: 56 MMHG | HEART RATE: 91 BPM | HEIGHT: 64 IN | WEIGHT: 115.4 LBS | RESPIRATION RATE: 16 BRPM | TEMPERATURE: 96.5 F | SYSTOLIC BLOOD PRESSURE: 101 MMHG | BODY MASS INDEX: 19.7 KG/M2 | OXYGEN SATURATION: 98 %

## 2020-01-27 PROCEDURE — 99239 HOSP IP/OBS DSCHRG MGMT >30: CPT | Performed by: PHYSICIAN ASSISTANT

## 2020-01-27 RX ORDER — HYDROXYZINE 50 MG/1
50 TABLET, FILM COATED ORAL EVERY 8 HOURS PRN
Qty: 30 TABLET | Refills: 1 | Status: SHIPPED | OUTPATIENT
Start: 2020-01-27 | End: 2020-03-02 | Stop reason: HOSPADM

## 2020-01-27 RX ORDER — HYDROXYZINE 50 MG/1
50 TABLET, FILM COATED ORAL EVERY 8 HOURS PRN
Qty: 30 TABLET | Refills: 1 | Status: SHIPPED | OUTPATIENT
Start: 2020-01-27 | End: 2020-01-27

## 2020-01-27 RX ORDER — QUETIAPINE FUMARATE 100 MG/1
150 TABLET, FILM COATED ORAL
Qty: 45 TABLET | Refills: 1 | Status: SHIPPED | OUTPATIENT
Start: 2020-01-27 | End: 2020-01-27

## 2020-01-27 RX ORDER — PRAZOSIN HYDROCHLORIDE 1 MG/1
1 CAPSULE ORAL
Qty: 30 CAPSULE | Refills: 1 | Status: SHIPPED | OUTPATIENT
Start: 2020-01-27 | End: 2020-01-27

## 2020-01-27 RX ORDER — LORAZEPAM 1 MG/1
1 TABLET ORAL 2 TIMES DAILY
Qty: 60 TABLET | Refills: 1 | Status: SHIPPED | OUTPATIENT
Start: 2020-01-27 | End: 2020-03-02 | Stop reason: HOSPADM

## 2020-01-27 RX ORDER — BENZTROPINE MESYLATE 1 MG/1
1 TABLET ORAL 2 TIMES DAILY
Qty: 60 TABLET | Refills: 1 | Status: SHIPPED | OUTPATIENT
Start: 2020-01-27 | End: 2020-01-27

## 2020-01-27 RX ORDER — BENZTROPINE MESYLATE 1 MG/1
1 TABLET ORAL 2 TIMES DAILY
Qty: 60 TABLET | Refills: 1 | Status: ON HOLD | OUTPATIENT
Start: 2020-01-27 | End: 2020-02-26

## 2020-01-27 RX ORDER — HALOPERIDOL 5 MG
5 TABLET ORAL 2 TIMES DAILY
Qty: 60 TABLET | Refills: 1 | Status: SHIPPED | OUTPATIENT
Start: 2020-01-27 | End: 2020-01-27

## 2020-01-27 RX ORDER — PRAZOSIN HYDROCHLORIDE 1 MG/1
1 CAPSULE ORAL
Qty: 30 CAPSULE | Refills: 1 | Status: ON HOLD | OUTPATIENT
Start: 2020-01-27 | End: 2020-03-02 | Stop reason: SDUPTHER

## 2020-01-27 RX ORDER — HALOPERIDOL 5 MG
5 TABLET ORAL 2 TIMES DAILY
Qty: 60 TABLET | Refills: 1 | Status: ON HOLD | OUTPATIENT
Start: 2020-01-27 | End: 2020-02-26

## 2020-01-27 RX ORDER — QUETIAPINE FUMARATE 100 MG/1
150 TABLET, FILM COATED ORAL
Qty: 45 TABLET | Refills: 1 | Status: SHIPPED | OUTPATIENT
Start: 2020-01-27 | End: 2020-03-02 | Stop reason: HOSPADM

## 2020-01-27 RX ADMIN — LORAZEPAM 1 MG: 1 TABLET ORAL at 07:46

## 2020-01-27 RX ADMIN — BENZTROPINE MESYLATE 1 MG: 1 TABLET ORAL at 07:45

## 2020-01-27 RX ADMIN — HALOPERIDOL 5 MG: 5 TABLET ORAL at 07:45

## 2020-01-27 NOTE — DISCHARGE INSTRUCTIONS
Depression   WHAT YOU NEED TO KNOW:   Depression is a medical condition that causes feelings of sadness or hopelessness that do not go away  Depression may cause you to lose interest in things you used to enjoy  These feelings may interfere with your daily life  DISCHARGE INSTRUCTIONS:   Call 911 for any of the following:   · You think about harming yourself or someone else  Contact your healthcare provider if:   · Your symptoms do not improve  · You cannot make it to your next appointment  · You have new symptoms  · You have questions or concerns about your condition or care  Medicines:   · Antidepressants  may be given to improve or balance your mood  You may need to take this medicine for several weeks before you begin to feel better  · Take your medicine as directed  Contact your healthcare provider if you think your medicine is not helping or if you have side effects  Tell him of her if you are allergic to any medicine  Keep a list of the medicines, vitamins, and herbs you take  Include the amounts, and when and why you take them  Bring the list or the pill bottles to follow-up visits  Carry your medicine list with you in case of an emergency  Therapy  may be used to treat your depression  A therapist will help you learn to cope with your thoughts and feelings  This can be done alone or in a group  It may also be done with family members or a significant other  Self-care:   · Get regular physical activity  Try to exercise for 30 minutes, 3 to 5 days a week  Work with your healthcare provider to develop an exercise plan that you enjoy  Physical activity may improve your symptoms  · Get enough sleep  Create a routine to help you relax before bed  You can listen to music, read, or do yoga  Try to go to bed and wake up at the same time every day  Sleep is important for emotional health  · Eat a variety of healthy foods from all of the food groups    A healthy meal plan is low in fat, salt, and added sugar  Ask your healthcare provider for more information about a meal plan that is right for you  · Do not drink alcohol or use drugs  Alcohol and drugs can make your symptoms worse  Follow up with your healthcare provider as directed: Your healthcare provider will monitor your progress at follow-up visits  He or she will also monitor your medicine if you take antidepressants  Your healthcare provider will ask if the medicine is helping  Tell him or her about any side effects or problems you may have with your medicine  The type or amount of medicine may need to be changed  Write down your questions so you remember to ask them during your visits  © 2017 [employer] Information is for End User's use only and may not be sold, redistributed or otherwise used for commercial purposes  All illustrations and images included in CareNotes® are the copyrighted property of [employer]  or Jony Dorsey  The above information is an  only  It is not intended as medical advice for individual conditions or treatments  Talk to your doctor, nurse or pharmacist before following any medical regimen to see if it is safe and effective for you

## 2020-01-27 NOTE — PLAN OF CARE
Problem: DEPRESSION  Goal: Will be euthymic at discharge  Description  INTERVENTIONS:  - Administer medication as ordered  - Provide emotional support via 1:1 interaction with staff  - Encourage involvement in milieu/groups/activities  - Monitor for social isolation  Outcome: Progressing     Problem: PSYCHOSIS  Goal: Will report no hallucinations or delusions  Description  Interventions:  - Administer medication as  ordered  - Every waking shifts and PRN assess for the presence of hallucinations and or delusions  - Assist with reality testing to support increasing orientation  - Assess if patient's hallucinations or delusions are encouraging self-harm or harm to others and intervene as appropriate  Outcome: Progressing     Problem: ANXIETY  Goal: Will report anxiety at manageable levels  Description  INTERVENTIONS:  - Administer medication as ordered  - Teach and encourage coping skills  - Provide emotional support  - Assess patient/family for anxiety and ability to cope  Outcome: Progressing  Goal: By discharge: Patient will verbalize 2 strategies to deal with anxiety  Description  Interventions:  - Identify any obvious source/trigger to anxiety  - Staff will assist patient in applying identified coping technique/skills  - Encourage attendance of scheduled groups and activities  Outcome: Progressing     Problem: SLEEP DISTURBANCE  Goal: Will exhibit normal sleeping pattern  Description  Interventions:  -  Assess the patients sleep pattern, noting recent changes  - Administer medication as ordered  - Decrease environmental stimuli, including noise, as appropriate during the night  - Encourage the patient to actively participate in unit groups and or exercise during the day to enhance ability to achieve adequate sleep at night  - Assess the patient, in the morning, encouraging a description of sleep experience  Outcome: Progressing     Problem: Ineffective Coping  Goal: Participates in unit activities  Description  Interventions:  - Provide therapeutic environment   - Provide required programming   - Redirect inappropriate behaviors   Outcome: Progressing     Problem: DISCHARGE PLANNING  Goal: Discharge to home or other facility with appropriate resources  Description  INTERVENTIONS:  - Identify barriers to discharge w/patient and caregiver  - Arrange for needed discharge resources and transportation as appropriate  - Identify discharge learning needs (meds, wound care, etc )  - Arrange for interpretive services to assist at discharge as needed  - Refer to Case Management Department for coordinating discharge planning if the patient needs post-hospital services based on physician/advanced practitioner order or complex needs related to functional status, cognitive ability, or social support system  Outcome: Progressing     Problem: Nutrition/Hydration-ADULT  Goal: Nutrient/Hydration intake appropriate for improving, restoring or maintaining nutritional needs  Description  Monitor and assess patient's nutrition/hydration status for malnutrition  Collaborate with interdisciplinary team and initiate plan and interventions as ordered  Monitor patient's weight and dietary intake as ordered or per policy  Utilize nutrition screening tool and intervene as necessary  Determine patient's food preferences and provide high-protein, high-caloric foods as appropriate       INTERVENTIONS:  - Monitor oral intake, urinary output, labs, and treatment plans  - Assess nutrition and hydration status and recommend course of action  - Evaluate amount of meals eaten  - Assist patient with eating if necessary   - Allow adequate time for meals  - Recommend/ encourage appropriate diets, oral nutritional supplements, and vitamin/mineral supplements  - Order, calculate, and assess calorie counts as needed  - Recommend, monitor, and adjust tube feedings and TPN/PPN based on assessed needs  - Assess need for intravenous fluids  - Provide specific nutrition/hydration education as appropriate  - Include patient/family/caregiver in decisions related to nutrition  Outcome: Progressing

## 2020-01-27 NOTE — CASE MANAGEMENT
CM met with pt to discuss plan for dc  Pt reports understanding of follow up to [facility] for mental health intake  Pt reports that she will follow up with [facility] for medical assistance application  Pt denies SI/HI and reports feeling much improved on the medications  Pt will fill her own medications as she has a small amount of savings  Pt will be picked up by her step mother who is supportive  Pt will return to address [address] [place] [address], [id number]

## 2020-01-27 NOTE — BH TRANSITION RECORD
Contact Information: If you have any questions, concerns, pended studies, tests and/or procedures, or emergencies regarding your inpatient behavioral health visit  Please contact [facility] behavioral health unit [phone] and ask to speak to a , nurse or physician  A contact is available 24 hours/ 7 days a week at this number  Summary of Procedures Performed During your Stay:  Below is a list of major procedures performed during your hospital stay and a summary of results:  - No major procedures performed  Pending Studies (From admission, onward)    None        If studies are pending at discharge, follow up with your PCP and/or referring provider
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
## 2020-01-27 NOTE — PROGRESS NOTES
Awake and alert  Social with peers  Expressed readiness for discharge  Denies SI/HI, AH/VH  Improved mood  No loud, piercing screams heard this AM   No talk about alters  Encouraged compliance with medications and OP appointments  Reports boyfriend and grandmother are a support  No questions or concerns

## 2020-01-27 NOTE — PROGRESS NOTES
01/27/20 0805   Team Meeting   Meeting Type Daily Rounds   Team Members Present   Team Members Present Physician;Nurse;;Occupational Therapist   Physician Team Member Dr Kandy Queen PA-C   Nursing Team Member [facility] Management Team Member Caryn/ [address]   OT Team Member Elisa   Patient/Family Present   Patient Present No   Patient's Family Present No     Denying SI  Had good visit with step mother  Received Atarax x 2 yesterday  Pt did scream yesterday, once  Pt will be discharged today

## 2020-01-27 NOTE — PROGRESS NOTES
Pt appears to have slept much of the night, short intervals of wakefulness in early morning hours  Pt OOB in Day Room at 0500, has since returned to room

## 2020-02-21 ENCOUNTER — APPOINTMENT (OUTPATIENT)
Dept: LAB | Facility: HOSPITAL | Age: 25
End: 2020-02-21
Attending: INTERNAL MEDICINE
Payer: COMMERCIAL

## 2020-02-21 ENCOUNTER — TRANSCRIBE ORDERS (OUTPATIENT)
Dept: LAB | Facility: HOSPITAL | Age: 25
End: 2020-02-21

## 2020-02-21 DIAGNOSIS — N91.2 ABSENCE OF MENSTRUATION: ICD-10-CM

## 2020-02-21 DIAGNOSIS — N91.2 ABSENCE OF MENSTRUATION: Primary | ICD-10-CM

## 2020-02-21 LAB — B-HCG SERPL-ACNC: <2 MIU/ML

## 2020-02-21 PROCEDURE — 36415 COLL VENOUS BLD VENIPUNCTURE: CPT

## 2020-02-21 PROCEDURE — 84702 CHORIONIC GONADOTROPIN TEST: CPT

## 2020-02-23 ENCOUNTER — TRANSCRIBE ORDERS (OUTPATIENT)
Dept: LAB | Facility: HOSPITAL | Age: 25
End: 2020-02-23

## 2020-02-23 ENCOUNTER — APPOINTMENT (OUTPATIENT)
Dept: LAB | Facility: HOSPITAL | Age: 25
End: 2020-02-23
Attending: INTERNAL MEDICINE
Payer: COMMERCIAL

## 2020-02-23 DIAGNOSIS — N91.2 AMENORRHEA: Primary | ICD-10-CM

## 2020-02-23 DIAGNOSIS — N91.2 AMENORRHEA: ICD-10-CM

## 2020-02-23 LAB — B-HCG SERPL-ACNC: <2 MIU/ML

## 2020-02-23 PROCEDURE — 36415 COLL VENOUS BLD VENIPUNCTURE: CPT

## 2020-02-23 PROCEDURE — 84702 CHORIONIC GONADOTROPIN TEST: CPT

## 2020-02-25 ENCOUNTER — HOSPITAL ENCOUNTER (INPATIENT)
Facility: HOSPITAL | Age: 25
LOS: 6 days | Discharge: HOME | DRG: 751 | End: 2020-03-02
Attending: PSYCHIATRY & NEUROLOGY | Admitting: PSYCHIATRY & NEUROLOGY
Payer: COMMERCIAL

## 2020-02-25 ENCOUNTER — HOSPITAL ENCOUNTER (EMERGENCY)
Facility: HOSPITAL | Age: 25
End: 2020-02-25
Attending: EMERGENCY MEDICINE | Admitting: EMERGENCY MEDICINE
Payer: COMMERCIAL

## 2020-02-25 VITALS
OXYGEN SATURATION: 99 % | TEMPERATURE: 98.9 F | SYSTOLIC BLOOD PRESSURE: 98 MMHG | BODY MASS INDEX: 20.86 KG/M2 | RESPIRATION RATE: 19 BRPM | WEIGHT: 121.5 LBS | DIASTOLIC BLOOD PRESSURE: 51 MMHG | HEART RATE: 67 BPM

## 2020-02-25 DIAGNOSIS — F33.3 SEVERE EPISODE OF RECURRENT MAJOR DEPRESSIVE DISORDER, WITH PSYCHOTIC FEATURES (HCC): ICD-10-CM

## 2020-02-25 DIAGNOSIS — R44.0 AUDITORY HALLUCINATION: ICD-10-CM

## 2020-02-25 DIAGNOSIS — F41.9 ANXIETY: ICD-10-CM

## 2020-02-25 DIAGNOSIS — F33.3 SEVERE EPISODE OF RECURRENT MAJOR DEPRESSIVE DISORDER, WITH PSYCHOTIC FEATURES (HCC): Primary | ICD-10-CM

## 2020-02-25 DIAGNOSIS — F41.1 GENERALIZED ANXIETY DISORDER: Primary | ICD-10-CM

## 2020-02-25 DIAGNOSIS — F43.10 PTSD (POST-TRAUMATIC STRESS DISORDER): ICD-10-CM

## 2020-02-25 LAB
ALBUMIN SERPL BCP-MCNC: 4.8 G/DL (ref 3.5–5)
ALP SERPL-CCNC: 76 U/L (ref 46–116)
ALT SERPL W P-5'-P-CCNC: 30 U/L (ref 12–78)
AMPHETAMINES SERPL QL SCN: NEGATIVE
ANION GAP SERPL CALCULATED.3IONS-SCNC: 8 MMOL/L (ref 4–13)
APAP SERPL-MCNC: <2 UG/ML (ref 10–20)
AST SERPL W P-5'-P-CCNC: 17 U/L (ref 5–45)
BARBITURATES UR QL: NEGATIVE
BASOPHILS # BLD AUTO: 0.04 THOUSANDS/ΜL (ref 0–0.1)
BASOPHILS NFR BLD AUTO: 0 % (ref 0–1)
BENZODIAZ UR QL: NEGATIVE
BILIRUB SERPL-MCNC: 0.31 MG/DL (ref 0.2–1)
BUN SERPL-MCNC: 8 MG/DL (ref 5–25)
CALCIUM SERPL-MCNC: 9.4 MG/DL (ref 8.3–10.1)
CHLORIDE SERPL-SCNC: 104 MMOL/L (ref 100–108)
CO2 SERPL-SCNC: 26 MMOL/L (ref 21–32)
COCAINE UR QL: NEGATIVE
CREAT SERPL-MCNC: 0.82 MG/DL (ref 0.6–1.3)
EOSINOPHIL # BLD AUTO: 0.09 THOUSAND/ΜL (ref 0–0.61)
EOSINOPHIL NFR BLD AUTO: 1 % (ref 0–6)
ERYTHROCYTE [DISTWIDTH] IN BLOOD BY AUTOMATED COUNT: 12.7 % (ref 11.6–15.1)
ETHANOL EXG-MCNC: 0 MG/DL
EXT PREG TEST URINE: NEGATIVE
EXT. CONTROL ED NAV: NORMAL
GFR SERPL CREATININE-BSD FRML MDRD: 100 ML/MIN/1.73SQ M
GLUCOSE SERPL-MCNC: 74 MG/DL (ref 65–140)
HCT VFR BLD AUTO: 42 % (ref 34.8–46.1)
HGB BLD-MCNC: 14.2 G/DL (ref 11.5–15.4)
IMM GRANULOCYTES # BLD AUTO: 0.03 THOUSAND/UL (ref 0–0.2)
IMM GRANULOCYTES NFR BLD AUTO: 0 % (ref 0–2)
LYMPHOCYTES # BLD AUTO: 1.87 THOUSANDS/ΜL (ref 0.6–4.47)
LYMPHOCYTES NFR BLD AUTO: 20 % (ref 14–44)
MCH RBC QN AUTO: 34 PG (ref 26.8–34.3)
MCHC RBC AUTO-ENTMCNC: 33.8 G/DL (ref 31.4–37.4)
MCV RBC AUTO: 101 FL (ref 82–98)
METHADONE UR QL: NEGATIVE
MONOCYTES # BLD AUTO: 0.97 THOUSAND/ΜL (ref 0.17–1.22)
MONOCYTES NFR BLD AUTO: 10 % (ref 4–12)
NEUTROPHILS # BLD AUTO: 6.44 THOUSANDS/ΜL (ref 1.85–7.62)
NEUTS SEG NFR BLD AUTO: 69 % (ref 43–75)
NRBC BLD AUTO-RTO: 0 /100 WBCS
OPIATES UR QL SCN: NEGATIVE
PCP UR QL: NEGATIVE
PLATELET # BLD AUTO: 219 THOUSANDS/UL (ref 149–390)
PMV BLD AUTO: 9.7 FL (ref 8.9–12.7)
POTASSIUM SERPL-SCNC: 4.2 MMOL/L (ref 3.5–5.3)
PROT SERPL-MCNC: 7.9 G/DL (ref 6.4–8.2)
RBC # BLD AUTO: 4.18 MILLION/UL (ref 3.81–5.12)
SALICYLATES SERPL-MCNC: <3 MG/DL (ref 3–20)
SODIUM SERPL-SCNC: 138 MMOL/L (ref 136–145)
THC UR QL: POSITIVE
WBC # BLD AUTO: 9.44 THOUSAND/UL (ref 4.31–10.16)

## 2020-02-25 PROCEDURE — 99285 EMERGENCY DEPT VISIT HI MDM: CPT

## 2020-02-25 PROCEDURE — 81025 URINE PREGNANCY TEST: CPT | Performed by: EMERGENCY MEDICINE

## 2020-02-25 PROCEDURE — 99284 EMERGENCY DEPT VISIT MOD MDM: CPT | Performed by: EMERGENCY MEDICINE

## 2020-02-25 PROCEDURE — 82075 ASSAY OF BREATH ETHANOL: CPT | Performed by: EMERGENCY MEDICINE

## 2020-02-25 PROCEDURE — 80329 ANALGESICS NON-OPIOID 1 OR 2: CPT | Performed by: EMERGENCY MEDICINE

## 2020-02-25 PROCEDURE — 80307 DRUG TEST PRSMV CHEM ANLYZR: CPT | Performed by: EMERGENCY MEDICINE

## 2020-02-25 PROCEDURE — 80053 COMPREHEN METABOLIC PANEL: CPT | Performed by: EMERGENCY MEDICINE

## 2020-02-25 PROCEDURE — 99283 EMERGENCY DEPT VISIT LOW MDM: CPT | Performed by: PSYCHIATRY & NEUROLOGY

## 2020-02-25 PROCEDURE — 85025 COMPLETE CBC W/AUTO DIFF WBC: CPT | Performed by: EMERGENCY MEDICINE

## 2020-02-25 PROCEDURE — 36415 COLL VENOUS BLD VENIPUNCTURE: CPT | Performed by: EMERGENCY MEDICINE

## 2020-02-25 RX ORDER — HALOPERIDOL 5 MG
5 TABLET ORAL 2 TIMES DAILY
Status: DISCONTINUED | OUTPATIENT
Start: 2020-02-25 | End: 2020-02-25 | Stop reason: HOSPADM

## 2020-02-25 RX ORDER — HALOPERIDOL 5 MG/ML
5 INJECTION INTRAMUSCULAR EVERY 6 HOURS PRN
Status: CANCELLED | OUTPATIENT
Start: 2020-02-25

## 2020-02-25 RX ORDER — MAGNESIUM HYDROXIDE/ALUMINUM HYDROXICE/SIMETHICONE 120; 1200; 1200 MG/30ML; MG/30ML; MG/30ML
30 SUSPENSION ORAL EVERY 4 HOURS PRN
Status: CANCELLED | OUTPATIENT
Start: 2020-02-25

## 2020-02-25 RX ORDER — BENZTROPINE MESYLATE 0.5 MG/1
1 TABLET ORAL 2 TIMES DAILY
Status: CANCELLED | OUTPATIENT
Start: 2020-02-25

## 2020-02-25 RX ORDER — OLANZAPINE 10 MG/1
10 TABLET ORAL EVERY 8 HOURS PRN
Status: CANCELLED | OUTPATIENT
Start: 2020-02-25

## 2020-02-25 RX ORDER — HALOPERIDOL 5 MG
5 TABLET ORAL EVERY 6 HOURS PRN
Status: DISCONTINUED | OUTPATIENT
Start: 2020-02-25 | End: 2020-03-02 | Stop reason: HOSPADM

## 2020-02-25 RX ORDER — ACETAMINOPHEN 325 MG/1
650 TABLET ORAL EVERY 4 HOURS PRN
Status: DISCONTINUED | OUTPATIENT
Start: 2020-02-25 | End: 2020-03-02 | Stop reason: HOSPADM

## 2020-02-25 RX ORDER — LORAZEPAM 1 MG/1
1 TABLET ORAL ONCE
Status: COMPLETED | OUTPATIENT
Start: 2020-02-25 | End: 2020-02-25

## 2020-02-25 RX ORDER — HYDROXYZINE HYDROCHLORIDE 25 MG/1
25 TABLET, FILM COATED ORAL EVERY 6 HOURS PRN
Status: CANCELLED | OUTPATIENT
Start: 2020-02-25

## 2020-02-25 RX ORDER — ACETAMINOPHEN 325 MG/1
350 TABLET ORAL EVERY 6 HOURS PRN
Status: CANCELLED | OUTPATIENT
Start: 2020-02-25

## 2020-02-25 RX ORDER — BENZTROPINE MESYLATE 1 MG/ML
1 INJECTION INTRAMUSCULAR; INTRAVENOUS EVERY 6 HOURS PRN
Status: CANCELLED | OUTPATIENT
Start: 2020-02-25

## 2020-02-25 RX ORDER — LORAZEPAM 1 MG/1
1 TABLET ORAL EVERY 6 HOURS PRN
Status: CANCELLED | OUTPATIENT
Start: 2020-02-25

## 2020-02-25 RX ORDER — LORAZEPAM 2 MG/ML
2 INJECTION INTRAMUSCULAR EVERY 6 HOURS PRN
Status: DISCONTINUED | OUTPATIENT
Start: 2020-02-25 | End: 2020-03-02 | Stop reason: HOSPADM

## 2020-02-25 RX ORDER — ACETAMINOPHEN 325 MG/1
650 TABLET ORAL EVERY 4 HOURS PRN
Status: CANCELLED | OUTPATIENT
Start: 2020-02-25

## 2020-02-25 RX ORDER — HALOPERIDOL 5 MG
5 TABLET ORAL 2 TIMES DAILY
Status: CANCELLED | OUTPATIENT
Start: 2020-02-25

## 2020-02-25 RX ORDER — HYDROXYZINE HYDROCHLORIDE 25 MG/1
50 TABLET, FILM COATED ORAL EVERY 6 HOURS PRN
Status: CANCELLED | OUTPATIENT
Start: 2020-02-25

## 2020-02-25 RX ORDER — LORAZEPAM 2 MG/ML
2 INJECTION INTRAMUSCULAR EVERY 6 HOURS PRN
Status: CANCELLED | OUTPATIENT
Start: 2020-02-25

## 2020-02-25 RX ORDER — RISPERIDONE 1 MG/1
1 TABLET, ORALLY DISINTEGRATING ORAL
Status: CANCELLED | OUTPATIENT
Start: 2020-02-25

## 2020-02-25 RX ORDER — MAGNESIUM HYDROXIDE/ALUMINUM HYDROXICE/SIMETHICONE 120; 1200; 1200 MG/30ML; MG/30ML; MG/30ML
30 SUSPENSION ORAL EVERY 4 HOURS PRN
Status: DISCONTINUED | OUTPATIENT
Start: 2020-02-25 | End: 2020-03-02 | Stop reason: HOSPADM

## 2020-02-25 RX ORDER — BENZTROPINE MESYLATE 0.5 MG/1
1 TABLET ORAL 2 TIMES DAILY
Status: DISCONTINUED | OUTPATIENT
Start: 2020-02-25 | End: 2020-02-25 | Stop reason: HOSPADM

## 2020-02-25 RX ORDER — HALOPERIDOL 5 MG
5 TABLET ORAL 2 TIMES DAILY
Status: DISCONTINUED | OUTPATIENT
Start: 2020-02-26 | End: 2020-02-26

## 2020-02-25 RX ORDER — ACETAMINOPHEN 325 MG/1
975 TABLET ORAL EVERY 6 HOURS PRN
Status: DISCONTINUED | OUTPATIENT
Start: 2020-02-25 | End: 2020-03-02 | Stop reason: HOSPADM

## 2020-02-25 RX ORDER — BENZTROPINE MESYLATE 1 MG/ML
1 INJECTION INTRAMUSCULAR; INTRAVENOUS EVERY 6 HOURS PRN
Status: DISCONTINUED | OUTPATIENT
Start: 2020-02-25 | End: 2020-03-02 | Stop reason: HOSPADM

## 2020-02-25 RX ORDER — OLANZAPINE 10 MG/1
10 TABLET ORAL EVERY 8 HOURS PRN
Status: DISCONTINUED | OUTPATIENT
Start: 2020-02-25 | End: 2020-03-02 | Stop reason: HOSPADM

## 2020-02-25 RX ORDER — OLANZAPINE 10 MG/1
10 INJECTION, POWDER, LYOPHILIZED, FOR SOLUTION INTRAMUSCULAR EVERY 8 HOURS PRN
Status: CANCELLED | OUTPATIENT
Start: 2020-02-25

## 2020-02-25 RX ORDER — HYDROXYZINE HYDROCHLORIDE 25 MG/1
25 TABLET, FILM COATED ORAL EVERY 6 HOURS PRN
Status: DISCONTINUED | OUTPATIENT
Start: 2020-02-25 | End: 2020-03-02 | Stop reason: HOSPADM

## 2020-02-25 RX ORDER — RISPERIDONE 1 MG/1
1 TABLET, ORALLY DISINTEGRATING ORAL
Status: DISCONTINUED | OUTPATIENT
Start: 2020-02-25 | End: 2020-03-02 | Stop reason: HOSPADM

## 2020-02-25 RX ORDER — TRAZODONE HYDROCHLORIDE 50 MG/1
50 TABLET ORAL
Status: DISCONTINUED | OUTPATIENT
Start: 2020-02-25 | End: 2020-03-02 | Stop reason: HOSPADM

## 2020-02-25 RX ORDER — ACETAMINOPHEN 325 MG/1
975 TABLET ORAL EVERY 6 HOURS PRN
Status: CANCELLED | OUTPATIENT
Start: 2020-02-25

## 2020-02-25 RX ORDER — HALOPERIDOL 5 MG
5 TABLET ORAL EVERY 6 HOURS PRN
Status: CANCELLED | OUTPATIENT
Start: 2020-02-25

## 2020-02-25 RX ORDER — TRAZODONE HYDROCHLORIDE 50 MG/1
50 TABLET ORAL
Status: CANCELLED | OUTPATIENT
Start: 2020-02-25

## 2020-02-25 RX ORDER — BENZTROPINE MESYLATE 1 MG/1
1 TABLET ORAL EVERY 6 HOURS PRN
Status: DISCONTINUED | OUTPATIENT
Start: 2020-02-25 | End: 2020-03-02 | Stop reason: HOSPADM

## 2020-02-25 RX ORDER — ACETAMINOPHEN 325 MG/1
325 TABLET ORAL EVERY 6 HOURS PRN
Status: DISCONTINUED | OUTPATIENT
Start: 2020-02-25 | End: 2020-03-02 | Stop reason: HOSPADM

## 2020-02-25 RX ORDER — HALOPERIDOL 5 MG/ML
5 INJECTION INTRAMUSCULAR EVERY 6 HOURS PRN
Status: DISCONTINUED | OUTPATIENT
Start: 2020-02-25 | End: 2020-03-02 | Stop reason: HOSPADM

## 2020-02-25 RX ORDER — LORAZEPAM 1 MG/1
1 TABLET ORAL EVERY 6 HOURS PRN
Status: DISCONTINUED | OUTPATIENT
Start: 2020-02-25 | End: 2020-03-02 | Stop reason: HOSPADM

## 2020-02-25 RX ORDER — BENZTROPINE MESYLATE 1 MG/1
1 TABLET ORAL 2 TIMES DAILY
Status: DISCONTINUED | OUTPATIENT
Start: 2020-02-26 | End: 2020-02-26

## 2020-02-25 RX ORDER — HYDROXYZINE 50 MG/1
50 TABLET, FILM COATED ORAL EVERY 6 HOURS PRN
Status: DISCONTINUED | OUTPATIENT
Start: 2020-02-25 | End: 2020-03-02 | Stop reason: HOSPADM

## 2020-02-25 RX ORDER — BENZTROPINE MESYLATE 0.5 MG/1
1 TABLET ORAL EVERY 6 HOURS PRN
Status: CANCELLED | OUTPATIENT
Start: 2020-02-25

## 2020-02-25 RX ORDER — OLANZAPINE 10 MG/1
10 INJECTION, POWDER, LYOPHILIZED, FOR SOLUTION INTRAMUSCULAR EVERY 8 HOURS PRN
Status: DISCONTINUED | OUTPATIENT
Start: 2020-02-25 | End: 2020-03-02 | Stop reason: HOSPADM

## 2020-02-25 RX ADMIN — LORAZEPAM 1 MG: 1 TABLET ORAL at 21:55

## 2020-02-25 RX ADMIN — HALOPERIDOL 5 MG: 5 TABLET ORAL at 18:29

## 2020-02-25 RX ADMIN — BENZTROPINE MESYLATE 1 MG: 0.5 TABLET ORAL at 18:29

## 2020-02-25 RX ADMIN — LORAZEPAM 1 MG: 1 TABLET ORAL at 16:38

## 2020-02-25 RX ADMIN — LORAZEPAM 1 MG: 1 TABLET ORAL at 11:00

## 2020-02-25 NOTE — ED NOTES
Pt consumed 100% of lunch tray with negative complications  Pt continues to be calm and cooperative, decreased anxiety noted  Pt stated "yeah, I do feel a little better now cause i'm not shaking anymore"  Lights dimmed for patient comfort  Will continue to monitor for safety       Hailey Bah RN  02/25/20 9076

## 2020-02-25 NOTE — ED NOTES
Pt sitting upright on hospital safety bed with blanket wrapped around shoulders, coloring with negative complications  Lunch tray ordered   Will continue to monitor for safety     Nicci Montalvo RN  02/25/20 5431

## 2020-02-25 NOTE — EMTALA/ACUTE CARE TRANSFER
[address] [address] [place] [address]  Dept: [phone]      EMTALA TRANSFER CONSENT    NAME July Bruno                                         1995                              MRN [id number]    I have been informed of my rights regarding examination, treatment, and transfer   by Dr Dawit Patel DO    Benefits: Specialized equipment and/or services available at the receiving facility (Include comment)________________________    Risks: Potential for delay in receiving treatment, Increased discomfort during transfer      Consent for Transfer:  I acknowledge that my medical condition has been evaluated and explained to me by the emergency department physician or other qualified medical person and/or my attending physician, who has recommended that I be transferred to the service of  Accepting Physician: Dr Jeremy Mallory at [address], [address] : [address]  The above potential benefits of such transfer, the potential risks associated with such transfer, and the probable risks of not being transferred have been explained to me, and I fully understand them  The doctor has explained that, in my case, the benefits of transfer outweigh the risks  I agree to be transferred  I authorize the performance of emergency medical procedures and treatments upon me in both transit and upon arrival at the receiving facility  Additionally, I authorize the release of any and all medical records to the receiving facility and request they be transported with me, if possible  I understand that the safest mode of transportation during a medical emergency is an ambulance and that the Hospital advocates the use of this mode of transport   Risks of traveling to the receiving facility by car, including absence of medical control, life sustaining equipment, such as oxygen, and medical personnel has been explained to me and I fully understand them     ([facility])  [  ]  I consent to the stated transfer and to be transported by ambulance/helicopter  [  ]  I consent to the stated transfer, but refuse transportation by ambulance and accept full responsibility for my transportation by car  I understand the risks of non-ambulance transfers and I exonerate the Hospital and its staff from any deterioration in my condition that results from this refusal     X___________________________________________    DATE  20  TIME________  Signature of patient or legally responsible individual signing on patient behalf           RELATIONSHIP TO PATIENT_________________________          Provider Certification    NAME Channing Liz                                         1995                              MRN [id number]    A medical screening exam was performed on the above named patient  Based on the examination:    Condition Necessitating Transfer The primary encounter diagnosis was Severe episode of recurrent major depressive disorder, with psychotic features ([facility] [address] )  Diagnoses of Auditory hallucination and Anxiety were also pertinent to this visit      Patient Condition: The patient has been stabilized such that within reasonable medical probability, no material deterioration of the patient condition or the condition of the unborn child(mo) is likely to result from the transfer    Reason for Transfer: Level of Care needed not available at this facility    Transfer Requirements: [address]   · Space available and qualified personnel available for treatment as acknowledged by    · Agreed to accept transfer and to provide appropriate medical treatment as acknowledged by       Dr Stuart Zamora  · Appropriate medical records of the examination and treatment of the patient are provided at the time of transfer   [address] _______  · Transfer will be performed by qualified personnel from    and appropriate transfer equipment as required, including the use of necessary and appropriate life support measures  Provider Certification: I have examined the patient and explained the following risks and benefits of being transferred/refusing transfer to the patient/family:  The patient is stable for psychiatric transfer because they are medically stable, and is protected from harming him/herself or others during transport      Based on these reasonable risks and benefits to the patient and/or the unborn child(mo), and based upon the information available at the time of the patients examination, I certify that the medical benefits reasonably to be expected from the provision of appropriate medical treatments at another medical facility outweigh the increasing risks, if any, to the individuals medical condition, and in the case of labor to the unborn child, from effecting the transfer      X____________________________________________ DATE 02/25/20        TIME_______      ORIGINAL - SEND TO MEDICAL RECORDS   COPY - SEND WITH PATIENT DURING TRANSFER

## 2020-02-25 NOTE — ED NOTES
Patient sleeping at this time and in no distress  1;1 sitter is present  Will continue to monitor       Merlinda Elkland  02/25/20 0810

## 2020-02-25 NOTE — ED PROVIDER NOTES
History  Chief Complaint   Patient presents with    Psychiatric Evaluation     c/o increased anxiety with auditory hallucinations (voices tell her to kill herself), SI with no specific plan, increased bulemia, unable to sleep x 1 week  Patient is a 66-year-old female with a history of anxiety, PTSD and bulimia who presents with auditory hallucinations  Patient states that she was hospitalized in January for a psychiatric break  She was doing better after hospitalization however started hearing voices once again about 2 weeks ago  She states that she hears the voice of Jm Mcfarland  She states that this voice is degrading and tells her that she is not a good person  The voices were manageable initially but have become more noticeable  She ran out of her Seroquel and Ativan about 4-5 days ago  She states that her symptoms have significantly worsened since then  The voices tell her to harm herself  She has also not been able to sleep  She took about 12 diphenhydramine tablets throughout the day yesterday and was still unable to sleep overnight  She has no plan to harm herself or anyone else  However she feels herself spiraling down and wanted to seek help  She also admits to worsening bulimia and increased retching over the past week  She normally draws to cope with her anxiety but has been unable to manage her anxiety recently  She also is unemployed at this time and lays in bed throughout the day        History provided by:  Patient  Psychiatric Evaluation   Presenting symptoms: hallucinations and suicidal thoughts    Degree of incapacity (severity):  Severe  Duration:  5 days  Progression:  Worsening  Chronicity:  Chronic  Context: noncompliance (ran out of medication)    Time since last psychoactive medication taken:  5 days  Associated symptoms: anxiety, appetite change, feelings of worthlessness and insomnia    Associated symptoms: no abdominal pain, no chest pain, no headaches and no weight change    Risk factors: hx of mental illness and recent psychiatric admission        Prior to Admission Medications   Prescriptions Last Dose Informant Patient Reported? Taking? LORazepam (ATIVAN) 1 mg tablet 2/24/2020 at Unknown time  No Yes   Sig: Take 1 tablet (1 mg total) by mouth 2 (two) times a day At 9am and 6pm   QUEtiapine (SEROquel) 100 mg tablet Past Week at Unknown time  No Yes   Sig: Take 1 5 tablets (150 mg total) by mouth daily at bedtime   benztropine (COGENTIN) 1 mg tablet 2/24/2020 at Unknown time  No Yes   Sig: Take 1 tablet (1 mg total) by mouth 2 (two) times a day At 9am and 6pm   haloperidol (HALDOL) 5 mg tablet 2/24/2020 at Unknown time  No Yes   Sig: Take 1 tablet (5 mg total) by mouth 2 (two) times a day At 9am and 6pm   hydrOXYzine HCL (ATARAX) 50 mg tablet Past Month at Unknown time  No Yes   Sig: Take 1 tablet (50 mg total) by mouth every 8 (eight) hours as needed (anxiety)   prazosin (MINIPRESS) 1 mg capsule 2/24/2020 at Unknown time  No Yes   Sig: Take 1 capsule (1 mg total) by mouth daily at bedtime      Facility-Administered Medications: None       Past Medical History:   Diagnosis Date    Anxiety     Bulimia     Depression     Polycystic ovarian disease     PTSD (post-traumatic stress disorder)     [facility]stance abuse ([facility] )     Suicide attempt ([place] [id number] )        History reviewed  No pertinent surgical history  Family History   Problem Relation Age of Onset    Bipolar disorder Mother     Drug abuse Mother     Alcohol abuse Mother      I have reviewed and agree with the history as documented      E-Cigarette/Vaping    E-Cigarette Use Never User      E-Cigarette/Vaping Substances     Social History     Tobacco Use    Smoking status: Current Every Day Smoker     Packs/day: 1 00     Years: 5 00     Pack years: 5 00     Types: Cigarettes    Smokeless tobacco: Never Used   Substance Use Topics    Alcohol use: Yes     Frequency: 2-4 times a month     Drinks per session: 1 or 2 Binge frequency: Never     Comment: occasional    Drug use: Yes     Types: Marijuana     Comment: daily       Review of Systems   Constitutional: Positive for appetite change  Negative for chills, diaphoresis and fever  HENT: Negative for nosebleeds, sore throat and trouble swallowing  Eyes: Negative for photophobia, pain and visual disturbance  Respiratory: Negative for cough, chest tightness and shortness of breath  Cardiovascular: Negative for chest pain, palpitations and leg swelling  Gastrointestinal: Negative for abdominal pain, constipation, diarrhea, nausea and vomiting  Endocrine: Negative for polydipsia and polyuria  Genitourinary: Negative for difficulty urinating, dysuria, hematuria, pelvic pain, vaginal bleeding and vaginal discharge  Musculoskeletal: Negative for back pain, neck pain and neck stiffness  Skin: Negative for pallor and rash  Neurological: Negative for dizziness, seizures, light-headedness and headaches  Psychiatric/Behavioral: Positive for hallucinations and suicidal ideas  The patient is nervous/anxious and has insomnia  All other systems reviewed and are negative  Physical Exam  Physical Exam   Constitutional: She is oriented to person, place, and time  She appears well-developed and well-nourished  No distress  HENT:   Head: Normocephalic and atraumatic  Mouth/Throat: Oropharynx is clear and moist and mucous membranes are normal    Eyes: Pupils are equal, round, and reactive to light  EOM are normal    Neck: Normal range of motion  Neck supple  Cardiovascular: Normal rate, regular rhythm, normal heart sounds, intact distal pulses and normal pulses  Pulmonary/Chest: Effort normal and breath sounds normal  No respiratory distress  Abdominal: Soft  She exhibits no distension  There is no tenderness  There is no rigidity, no rebound and no guarding  Musculoskeletal: Normal range of motion  She exhibits no edema or tenderness     Lymphadenopathy: She has no cervical adenopathy  Neurological: She is alert and oriented to person, place, and time  She has normal strength  No cranial nerve deficit or sensory deficit  Skin: Skin is warm and dry  Capillary refill takes less than 2 seconds  Psychiatric: She has a normal mood and affect  Nursing note and vitals reviewed        Vital Signs  ED Triage Vitals [02/25/20 1025]   Temperature Pulse Respirations Blood Pressure SpO2   98 9 °F (37 2 °C) 102 17 130/71 99 %      Temp Source Heart Rate Source Patient Position - Orthostatic VS BP Location FiO2 (%)   Oral Monitor Sitting Left arm --      Pain Score       No Pain           Vitals:    02/25/20 1025 02/25/20 1452   BP: 130/71 122/64   Pulse: 102 58   Patient Position - Orthostatic VS: Sitting Sitting         Visual Acuity      ED Medications  Medications   benztropine (COGENTIN) tablet 1 mg (has no administration in time range)   haloperidol (HALDOL) tablet 5 mg (has no administration in time range)   LORazepam (ATIVAN) tablet 1 mg (1 mg Oral Given 2/25/20 1100)   LORazepam (ATIVAN) tablet 1 mg (1 mg Oral Given 2/25/20 1638)       Diagnostic Studies  Results Reviewed     Procedure Component Value Units Date/Time    POCT pregnancy, urine [[id number]]  (Normal) Resulted:  02/25/20 1229    Lab Status:  Final result Updated:  02/25/20 1230     EXT PREG TEST UR (Ref: Negative) negative     Control valid    Acetaminophen level-"If concentration is detectable, please discuss with medical  on call " [[id number]]  (Abnormal) Collected:  02/25/20 1105    Lab Status:  Final result Specimen:  Blood from Arm, Right Updated:  02/25/20 1138     Acetaminophen Level <2 ug/mL     Salicylate level [[id number]]  (Abnormal) Collected:  02/25/20 1105    Lab Status:  Final result Specimen:  Blood from Arm, Right Updated:  11/11/21 0755     Salicylate Lvl <3 mg/dL     Comprehensive metabolic panel [[id number]] Collected:  02/25/20 1105    Lab Status:  Final result Specimen:  Blood from Arm, Right Updated:  02/25/20 1138     Sodium 138 mmol/L      Potassium 4 2 mmol/L      Chloride 104 mmol/L      CO2 26 mmol/L      ANION GAP 8 mmol/L      BUN 8 mg/dL      Creatinine 0 82 mg/dL      Glucose 74 mg/dL      Calcium 9 4 mg/dL      AST 17 U/L      ALT 30 U/L      Alkaline Phosphatase 76 U/L      Total Protein 7 9 g/dL      Albumin 4 8 g/dL      Total Bilirubin 0 31 mg/dL      eGFR 100 ml/min/1 73sq m     Narrative:       National Kidney Disease Foundation guidelines for Chronic Kidney Disease (CKD):     Stage 1 with normal or high GFR (GFR > 90 mL/min/1 73 square meters)    Stage 2 Mild CKD (GFR = 60-89 mL/min/1 73 square meters)    Stage 3A Moderate CKD (GFR = 45-59 mL/min/1 73 square meters)    Stage 3B Moderate CKD (GFR = 30-44 mL/min/1 73 square meters)    Stage 4 Severe CKD (GFR = 15-29 mL/min/1 73 square meters)    Stage 5 End Stage CKD (GFR <15 mL/min/1 73 square meters)  Note: GFR calculation is accurate only with a steady state creatinine    Rapid drug screen, urine [[id number]]  (Abnormal) Collected:  02/25/20 1105    Lab Status:  Final result Specimen:  Urine, Clean Catch Updated:  02/25/20 1136     Amph/Meth UR Negative     Barbiturate Ur Negative     Benzodiazepine Urine Negative     Cocaine Urine Negative     Methadone Urine Negative     Opiate Urine Negative     PCP Ur Negative     THC Urine Positive    Narrative:       Presumptive report  If requested, specimen will be sent to reference lab for confirmation  FOR MEDICAL PURPOSES ONLY  IF CONFIRMATION NEEDED PLEASE CONTACT THE LAB WITHIN 5 DAYS      Drug Screen Cutoff Levels:  AMPHETAMINE/METHAMPHETAMINES  1000 ng/mL  BARBITURATES     200 ng/mL  BENZODIAZEPINES     200 ng/mL  COCAINE      300 ng/mL  METHADONE      300 ng/mL  OPIATES      300 ng/mL  PHENCYCLIDINE     25 ng/mL  THC       50 ng/mL      CBC and differential [[id number]]  (Abnormal) Collected:  02/25/20 1105    Lab Status:  Final result Specimen: Blood from Arm, Right Updated:  02/25/20 1124     WBC 9 44 Thousand/uL      RBC 4 18 Million/uL      Hemoglobin 14 2 g/dL      Hematocrit 42 0 %       fL      MCH 34 0 pg      MCHC 33 8 g/dL      RDW 12 7 %      MPV 9 7 fL      Platelets 508 Thousands/uL      nRBC 0 /100 WBCs      Neutrophils Relative 69 %      Immat GRANS % 0 %      Lymphocytes Relative 20 %      Monocytes Relative 10 %      Eosinophils Relative 1 %      Basophils Relative 0 %      Neutrophils Absolute 6 44 Thousands/µL      Immature Grans Absolute 0 03 Thousand/uL      Lymphocytes Absolute 1 87 Thousands/µL      Monocytes Absolute 0 97 Thousand/µL      Eosinophils Absolute 0 09 Thousand/µL      Basophils Absolute 0 04 Thousands/µL     POCT alcohol breath test [[id number]]  (Normal) Resulted:  02/25/20 1105    Lab Status:  Final result Updated:  02/25/20 1113     EXTBreath Alcohol 0 000                 No orders to display              Procedures  Procedures         ED Course                               MDM  Number of Diagnoses or Management Options  Anxiety: new and requires workup  Auditory hallucination: new and requires workup  Diagnosis management comments: Patient with a history of PTSD and anxiety presents with auditory hallucinations  These wishes are telling her to harm herself  She was evaluated by Psychiatry and has signed a 201  Crisis has found placement and patient will be transported to [facility] for psychiatric hospitalization  Patient was given Ativan for her anxiety in the emergency department  I also ordered her home medications of Cogentin and Haldol         Amount and/or Complexity of Data Reviewed  Clinical lab tests: ordered and reviewed  Tests in the medicine section of CPT®: ordered and reviewed  Review and summarize past medical records: yes    Risk of Complications, Morbidity, and/or Mortality  Presenting problems: high  Diagnostic procedures: moderate  Management options: moderate    Patient Progress  Patient progress: stable        Disposition  Final diagnoses: Auditory hallucination   Anxiety     Time reflects when diagnosis was documented in both MDM as applicable and the Disposition within this note     Time User Action Codes Description Comment    2/25/2020  4:49 PM Yodit Ear Add [F33 3] Severe episode of recurrent major depressive disorder, with psychotic features ([facility] )     2/25/2020  4:49 PM [place] Ear Modify [F33 3] Severe episode of recurrent major depressive disorder, [facility]     2/25/2020  4:54 PM Cordelia Passey Add [R44 0] Auditory hallucination     2/25/2020  4:54 PM Cordelia Passey Add [F41 9] Anxiety       ED Disposition     ED Disposition Condition Date/Time Comment    Transfer to [facility] Feb 25, 2020  4:53 PM Joanna Huerta should be transferred out to [facility] and has been medically cleared          MD Documentation      Most Recent Value   Patient Condition  The patient has been stabilized such that within reasonable medical probability, no material deterioration of the patient condition or the condition of the unborn child(mo) is likely to result from the transfer   Reason for Transfer  Level of Care needed not available at this facility   Benefits of Transfer  Specialized equipment and/or services available at the receiving facility (Include comment)________________________   Risks of Transfer  Potential for delay in receiving treatment, Increased discomfort during transfer   Accepting Physician  Dr Margaretta Kawasaki Name, Joslyn Martinez   Sending MD Dr Joesph Bush   Provider Certification  The patient is stable for psychiatric transfer because they are medically stable, and is protected from harming him/herself or others during transport      RN Documentation      Most [address] [facility] Name, Sole Alva 2W      Follow-up Information    None         Patient's Medications   Discharge Prescriptions    No medications on file     No discharge procedures on file      PDMP Review     None          ED Provider  Electronically Signed by           Prakahs Boss DO  02/25/20 0396

## 2020-02-25 NOTE — ED NOTES
Pt 1:1 aware pt has medication for anxiety and to notify RN when pt awakes        Clarisa Liz RN  02/25/20 8567

## 2020-02-25 NOTE — ED NOTES
Patient is accepted at [address]  Patient is accepted by Dr Zoë Quiroz per [facility] is arranged with [facility]   Transportation is scheduled for 20:30   Patient may go to the floor at after 19:00        Nurse report is to be called to [phone] prior to patient transfer

## 2020-02-25 NOTE — ED NOTES
Ordered patient dinner tray  Patient resting comfortably with lights off and tv on in room  1;1 sitter is present  Will continue to monitor       Savannah Harvey  02/25/20 2809

## 2020-02-25 NOTE — ED NOTES
Pt requesting medication for anxiety  Pt aware the doctor will be asked at the next available opportunity         Jenni Blair RN  02/25/20 1955
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
## 2020-02-26 PROBLEM — F50.2 PURGING: Status: ACTIVE | Noted: 2020-02-26

## 2020-02-26 LAB
BILIRUB UR QL STRIP: NEGATIVE
CLARITY UR: NORMAL
COLOR UR: YELLOW
GLUCOSE UR STRIP-MCNC: NEGATIVE MG/DL
HGB UR QL STRIP.AUTO: NEGATIVE
KETONES UR STRIP-MCNC: NEGATIVE MG/DL
LEUKOCYTE ESTERASE UR QL STRIP: NEGATIVE
NITRITE UR QL STRIP: NEGATIVE
PH UR STRIP.AUTO: 6.5 [PH]
PROT UR STRIP-MCNC: NEGATIVE MG/DL
SP GR UR STRIP.AUTO: 1.01 (ref 1–1.03)
UROBILINOGEN UR QL STRIP.AUTO: 0.2 E.U./DL

## 2020-02-26 PROCEDURE — 99254 IP/OBS CNSLTJ NEW/EST MOD 60: CPT | Performed by: PHYSICIAN ASSISTANT

## 2020-02-26 PROCEDURE — 81003 URINALYSIS AUTO W/O SCOPE: CPT | Performed by: PHYSICIAN ASSISTANT

## 2020-02-26 PROCEDURE — 99222 1ST HOSP IP/OBS MODERATE 55: CPT | Performed by: PSYCHIATRY & NEUROLOGY

## 2020-02-26 RX ORDER — PRAZOSIN HYDROCHLORIDE 1 MG/1
1 CAPSULE ORAL
Status: DISCONTINUED | OUTPATIENT
Start: 2020-02-26 | End: 2020-03-02 | Stop reason: HOSPADM

## 2020-02-26 RX ORDER — LORAZEPAM 1 MG/1
1 TABLET ORAL 2 TIMES DAILY
Status: DISCONTINUED | OUTPATIENT
Start: 2020-02-26 | End: 2020-02-28

## 2020-02-26 RX ORDER — QUETIAPINE FUMARATE 200 MG/1
200 TABLET, FILM COATED ORAL
Status: DISCONTINUED | OUTPATIENT
Start: 2020-02-26 | End: 2020-03-02 | Stop reason: HOSPADM

## 2020-02-26 RX ADMIN — PRAZOSIN HYDROCHLORIDE 1 MG: 1 CAPSULE ORAL at 21:11

## 2020-02-26 RX ADMIN — LORAZEPAM 1 MG: 1 TABLET ORAL at 14:48

## 2020-02-26 RX ADMIN — LORAZEPAM 1 MG: 1 TABLET ORAL at 20:03

## 2020-02-26 RX ADMIN — SERTRALINE HYDROCHLORIDE 50 MG: 50 TABLET ORAL at 11:48

## 2020-02-26 RX ADMIN — HYDROXYZINE HYDROCHLORIDE 50 MG: 50 TABLET, FILM COATED ORAL at 11:48

## 2020-02-26 RX ADMIN — LORAZEPAM 1 MG: 1 TABLET ORAL at 08:54

## 2020-02-26 RX ADMIN — QUETIAPINE FUMARATE 200 MG: 200 TABLET ORAL at 21:11

## 2020-02-26 RX ADMIN — BENZTROPINE MESYLATE 1 MG: 1 TABLET ORAL at 08:52

## 2020-02-26 RX ADMIN — HALOPERIDOL 5 MG: 5 TABLET ORAL at 08:52

## 2020-02-26 NOTE — TREATMENT TEAM
AM rounds per report from previous shift: [address], increased anxiety, AH of bad things, dual personality

## 2020-02-26 NOTE — CASE MANAGEMENT
[facility] [place] referral submitted  CM outreached to [employer]  Appointment secured for 3 4 20 @ 11 am  CM added these referrals to pt's dc instructions
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
## 2020-02-26 NOTE — PROGRESS NOTES
Pt arrived on the U with the following items  AT BEDSIDE  -sweaters x3  -shirts x3  -pants x3  -underware x3  -bra  -brush  -deodorant  -aquaphor  -slippers  IN LOCKER  -sweater  -shirts x6  -pants x2  -blanket  -body spray  -loose papers  -cigarettes  -pouch  -duffle bag  -sneakers

## 2020-02-26 NOTE — TREATMENT PLAN
TREATMENT PLAN REVIEW - [address] 24 y o  1995 female MRN: [id number]    [address] [place] [address] Room / Bed: Jm Mckinney [facility] 788-56 Encounter: [id number]          Admit Date/Time:  2/25/2020  8:56 PM    Treatment Team: Attending Provider: Pino Almodovar DO; Consulting Physician: Karen Luna MD; Patient Care Technician: Aline Jackson;  Registered Nurse: Erick Soto RN; : Jada Robles; Patient Care Technician: Delaney Spears; Patient Care Technician: Yusuf Snow; Patient Care Technician: Jatinder Hernandez; Medications RN: Claudette Halter, RN; Medications RN: Janora Heimlich, RN; Care Manager: Yvette Mccloud RN; Occupational Therapy Assistant: LEANNA Freeman; Patient Care Technician: Vipin Walton; Registered Nurse: Eleni Breen RN    Diagnosis: Principal Problem:    Severe episode of recurrent major depressive disorder, with psychotic features [facility]  Active Problems:    Generalized anxiety disorder    Medical clearance for psychiatric admission    PTSD (post-traumatic stress disorder)    History of Purging behavior    Patient Strengths: cooperative, motivated, patient is on a voluntary commitment     Patient Barriers: financial instability, limited insight, noncompliant with medication    Short Term Goals: decrease in depressive symptoms, decrease in anxiety symptoms, decrease in paranoid thoughts, decrease in psychotic symptoms, decrease in suicidal thoughts    Long Term Goals: improvement in depression, improvement in anxiety, resolution of depressive symptoms, free of suicidal thoughts, resolution of psychotic symptoms, improvement in reality testing, improvement in reasoning ability, improved insight    Progress Towards Goals: starting psychiatric medications as prescribed, continue psychiatric medications as prescribed    Recommended Treatment: medication management, patient medication education, group therapy, milieu therapy, continued Behavioral Health psychiatric evaluation/assessment process    Treatment Frequency: daily medication monitoring, group and milieu therapy daily, monitoring through interdisciplinary rounds, monitoring through weekly patient care conferences    Expected Discharge Date:  7 days    Discharge Plan: discharge to home, referral for outpatient medication management with a psychiatrist, referral for outpatient psychotherapy, referrals as indicated    Treatment Plan Created/Updated By: Yusra Palacios III, DO

## 2020-02-26 NOTE — CASE MANAGEMENT
Raymond Su from [facility] called this writer back to say they received application but they are finding it difficult to locate her insurance on the promise portal  Raymond Su provided number to call back [phone] ext [phone]

## 2020-02-26 NOTE — PLAN OF CARE
Problem: DEPRESSION  Goal: Will be euthymic at discharge  Description  INTERVENTIONS:  - Administer medication as ordered  - Provide emotional support via 1:1 interaction with staff  - Encourage involvement in milieu/groups/activities  - Monitor for social isolation  Outcome: Progressing     Problem: PSYCHOSIS  Goal: Will report no hallucinations or delusions  Description  Interventions:  - Administer medication as  ordered  - Every waking shifts and PRN assess for the presence of hallucinations and or delusions  - Assist with reality testing to support increasing orientation  - Assess if patient's hallucinations or delusions are encouraging self-harm or harm to others and intervene as appropriate  Outcome: Progressing     Problem: ANXIETY  Goal: Will report anxiety at manageable levels  Description  INTERVENTIONS:  - Administer medication as ordered  - Teach and encourage coping skills  - Provide emotional support  - Assess patient/family for anxiety and ability to cope  Outcome: Progressing  Goal: By discharge: Patient will verbalize 2 strategies to deal with anxiety  Description  Interventions:  - Identify any obvious source/trigger to anxiety  - Staff will assist patient in applying identified coping technique/skills  - Encourage attendance of scheduled groups and activities  Outcome: Progressing     Problem: SLEEP DISTURBANCE  Goal: Will exhibit normal sleeping pattern  Description  Interventions:  -  Assess the patients sleep pattern, noting recent changes  - Administer medication as ordered  - Decrease environmental stimuli, including noise, as appropriate during the night  - Encourage the patient to actively participate in unit groups and or exercise during the day to enhance ability to achieve adequate sleep at night  - Assess the patient, in the morning, encouraging a description of sleep experience  Outcome: Progressing     Problem: Ineffective Coping  Goal: Participates in unit activities  Description  Interventions:  - Provide therapeutic environment   - Provide required programming   - Redirect inappropriate behaviors   Outcome: Progressing

## 2020-02-26 NOTE — ED NOTES
Patient left [address] [place] [address] with [facility] transport with all belongings and proper paperwork       Elza Bonilla  02/25/20 2010

## 2020-02-26 NOTE — PROGRESS NOTES
Pt received PRN Atarax for increased anxiety  Pt reports feeling calmer on follow up  Pt observed sleeping in bed shortly after conversation

## 2020-02-26 NOTE — ASSESSMENT & PLAN NOTE
· Patient reports history of hyperthyroidism  · Most recent TSH and T4 have been normal  · Can recheck and follow-up outpatient

## 2020-02-26 NOTE — PROGRESS NOTES
25year old female adm to [facility] c/o increased anxiety , fleeting voices  telling her bad things contracts for safety, is pleasant and cooperative last adm to  This unit was one month ago

## 2020-02-26 NOTE — ED NOTES
Patient sleeping with lights off and tv on in room and in no distress at this time  1;1 sitter is present  Will continue to monitor       Lowell Bray  02/25/20 4585
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
## 2020-02-26 NOTE — ASSESSMENT & PLAN NOTE
· Patient with PTSD and bulimia  · Worsening anxiety with hallucinations  · Management per psychiatry  · Admission labs within normal limits  · EKG reveals NSR,

## 2020-02-26 NOTE — CONSULTS
[address] Internal Medicine  Consult- Cameron Coughlin 1995, 25 y o  female MRN: [id number]  Unit/Bed#: Dylan Martinez 253-01 Encounter: [id number]  Primary Care Provider: Zi Celaya MD   Date and time admitted to hospital: 2/25/2020  8:56 PM    Inpatient consult for Medical Clearance for [facility] patient  Consult performed by: Zhanna Velez PA-C  Consult ordered by: Krystin De Los Santos PA-C          Polysubstance abuse [facility])  Assessment & Plan  · Tobacco and marijuana use  · Nicotine replacement  · UDS positive for THC on admission  · counseled on cessation    Medical clearance for psychiatric admission  Assessment & Plan  · Patient reports history of hyperthyroidism  · Most recent TSH and T4 have been normal  · Can recheck and follow-up outpatient    * Severe episode of recurrent major depressive disorder, with psychotic features ([facility] [address] )  Assessment & Plan  · Patient with PTSD and bulimia  · Worsening anxiety with hallucinations  · Management per psychiatry  · Admission labs within normal limits  · EKG reveals NSR,         VTE Prophylaxis: Reason for no pharmacologic prophylaxis Ambulatory  / reason for no mechanical VTE prophylaxis Ambulatory     Recommendations for Discharge:  · Routine follow up with PCP, education on her normal thyroid levels, cessation from tobacco and marijuana use    Counseling / Coordination of Care Time: 30 minutes  Greater than 50% of total time spent on patient counseling and coordination of care  Collaboration of Care: Were Recommendations Directly Discussed with Primary Treatment Team? - No     History of Present Illness:    Cameron Coughlin is a 25 y o  female who is originally admitted to the behavioral health service due to worsening anxiety, hallucinations  We are consulted for management of chronic medical conditions  Patient denies any chronic medical conditions for which she takes daily medications  Per chart review patient does have PCOS    She also reports she has hyperthyroidism  Denies being on any medication for this  Most recent TSH and T4 have been normal   Doubt patient does actually have thyroid issues  Patient denies recent travel, illness or sick contacts  Patient denies alcohol use  Admits to tobacco and marijuana use  Marijuana may also be contributing to psychotic type symptoms  Recommend cessation on discharge  Patient reports chest palpitations/pain associated with her anxiety  Most recent EKG normal   Available admission lab work and vitals are acceptable  Patient feels a baseline physical health  Patient appears medically stable for inpatient psychiatric treatment at this time  Review of Systems:    Review of Systems   Cardiovascular: Positive for palpitations  Psychiatric/Behavioral: Positive for agitation, dysphoric mood and hallucinations  The patient is nervous/anxious  All other systems reviewed and are negative  Past Medical and Surgical History:     Past Medical History:   Diagnosis Date    Anxiety     Bulimia     Depression     Polycystic ovarian disease     PTSD (post-traumatic stress disorder)     [facility]     Suicide attempt ([facility] )        No past surgical history on file  Meds/Allergies:    PTA meds:   Prior to Admission Medications   Prescriptions Last Dose Informant Patient Reported? Taking?    LORazepam (ATIVAN) 1 mg tablet   No No   Sig: Take 1 tablet (1 mg total) by mouth 2 (two) times a day At 9am and 6pm   QUEtiapine (SEROquel) 100 mg tablet   No No   Sig: Take 1 5 tablets (150 mg total) by mouth daily at bedtime   benztropine (COGENTIN) 1 mg tablet   No No   Sig: Take 1 tablet (1 mg total) by mouth 2 (two) times a day At 9am and 6pm   haloperidol (HALDOL) 5 mg tablet   No No   Sig: Take 1 tablet (5 mg total) by mouth 2 (two) times a day At 9am and 6pm   hydrOXYzine HCL (ATARAX) 50 mg tablet   No No   Sig: Take 1 tablet (50 mg total) by mouth every 8 (eight) hours as needed (anxiety)   prazosin (MINIPRESS) 1 mg capsule   No No   Sig: Take 1 capsule (1 mg total) by mouth daily at bedtime      Facility-Administered Medications: None       Allergies: Allergies   Allergen Reactions    Codeine Hives    Penicillins Hives    Reglan [Metoclopramide]        Social History:     Marital Status: Single    Substance Use History:   Social History     Substance and Sexual Activity   Alcohol Use Yes    Frequency: 2-4 times a month    Drinks per session: 1 or 2    Binge frequency: Never    Comment: occasional     Social History     Tobacco Use   Smoking Status Current Every Day Smoker    Packs/day: 1 00    Years: 5 00    Pack years: 5 00    Types: Cigarettes   Smokeless Tobacco Never Used     Social History     Substance and Sexual Activity   Drug Use Yes    Types: Marijuana    Comment: daily       Family History:    Family History   Problem Relation Age of Onset    Bipolar disorder Mother     Drug abuse Mother     Alcohol abuse Mother        Physical Exam:     Vitals:   Blood Pressure: 132/59 (02/26/20 0739)  Pulse: 92 (02/26/20 0739)  Temperature: (!) 97 1 °F (36 2 °C) (02/26/20 0739)  Temp Source: Tympanic (02/26/20 0739)  Respirations: 16 (02/26/20 0739)  Height: 5' 4" (162 6 cm) (02/25/20 2300)  Weight - Scale: 53 5 kg (118 lb) (02/25/20 2300)  SpO2: 99 % (02/25/20 2300)    Physical Exam   Constitutional: She appears well-developed and well-nourished  HENT:   Head: Normocephalic and atraumatic  Eyes: Conjunctivae are normal  No scleral icterus  Cardiovascular: Normal rate and regular rhythm  No murmur heard  Pulmonary/Chest: Breath sounds normal  No respiratory distress  Abdominal: Soft  There is no tenderness  Neurological: No sensory deficit  CN II-XII grossly intact   Skin: Skin is warm and dry  Psychiatric: Her mood appears anxious  Additional Data:     Lab Results: I have personally reviewed pertinent reports        Results from last 7 days   Lab Units 02/25/20  1105   WBC Thousand/uL 9 44   HEMOGLOBIN g/dL 14 2   HEMATOCRIT % 42 0   PLATELETS Thousands/uL 219   NEUTROS PCT % 69   LYMPHS PCT % 20   MONOS PCT % 10   EOS PCT % 1     Results from last 7 days   Lab Units 02/25/20  1105   SODIUM mmol/L 138   POTASSIUM mmol/L 4 2   CHLORIDE mmol/L 104   CO2 mmol/L 26   BUN mg/dL 8   CREATININE mg/dL 0 82   ANION GAP mmol/L 8   CALCIUM mg/dL 9 4   ALBUMIN g/dL 4 8   TOTAL BILIRUBIN mg/dL 0 31   ALK PHOS U/L 76   ALT U/L 30   AST U/L 17   GLUCOSE RANDOM mg/dL 74             Lab Results   Component Value Date/Time    HGBA1C 5 2 01/21/2020 06:33 AM    HGBA1C 5 4 09/07/2017 06:19 AM               Imaging: I have personally reviewed pertinent reports  No orders to display       EKG, Pathology, and Other Studies Reviewed on Admission:   · EKG: January 2020: NSR,     ** Please Note: This note has been constructed using a voice recognition system   **

## 2020-02-26 NOTE — ASSESSMENT & PLAN NOTE
· Tobacco and marijuana use  · Nicotine replacement  · UDS positive for THC on admission  · counseled on cessation  · Possible that marijuana is causing increased paranoia and symptoms

## 2020-02-26 NOTE — PROGRESS NOTES
Pt crying and tearful this morning  Pt said she did not have the money to refill some of her medications so she was off them  Her assistance kicked in today so she should not have that problem in the future  She said she also came in to get her medications adjusted, also  She said that she is trying not to have her bad side come out  She has alternate personalities  Denied SI, HI  Hears voices

## 2020-02-27 LAB
RPR SER QL: NORMAL
TSH SERPL DL<=0.05 MIU/L-ACNC: 1.01 UIU/ML (ref 0.36–3.74)

## 2020-02-27 PROCEDURE — 84443 ASSAY THYROID STIM HORMONE: CPT | Performed by: PSYCHIATRY & NEUROLOGY

## 2020-02-27 PROCEDURE — 99232 SBSQ HOSP IP/OBS MODERATE 35: CPT | Performed by: PSYCHIATRY & NEUROLOGY

## 2020-02-27 PROCEDURE — 86592 SYPHILIS TEST NON-TREP QUAL: CPT | Performed by: PSYCHIATRY & NEUROLOGY

## 2020-02-27 RX ADMIN — PRAZOSIN HYDROCHLORIDE 1 MG: 1 CAPSULE ORAL at 21:32

## 2020-02-27 RX ADMIN — NICOTINE POLACRILEX 2 MG: 2 GUM, CHEWING BUCCAL at 12:13

## 2020-02-27 RX ADMIN — LORAZEPAM 1 MG: 1 TABLET ORAL at 18:59

## 2020-02-27 RX ADMIN — SERTRALINE HYDROCHLORIDE 50 MG: 50 TABLET ORAL at 08:12

## 2020-02-27 RX ADMIN — QUETIAPINE FUMARATE 200 MG: 200 TABLET ORAL at 21:33

## 2020-02-27 RX ADMIN — LORAZEPAM 1 MG: 1 TABLET ORAL at 08:12

## 2020-02-27 RX ADMIN — HYDROXYZINE HYDROCHLORIDE 50 MG: 50 TABLET, FILM COATED ORAL at 16:13

## 2020-02-27 RX ADMIN — HYDROXYZINE HYDROCHLORIDE 50 MG: 50 TABLET, FILM COATED ORAL at 09:25

## 2020-02-27 NOTE — PROGRESS NOTES
Pt requested and received PRN Atarax at 31-70-28-28 for anxiety  Eduardo score 18  Medication effective upon follow up

## 2020-02-27 NOTE — PLAN OF CARE
Pt presented as pleasant and cooperative  She attended therapeutic groups throughout the day and participated appropriately    Pt verbalized feeling more hopeful today and was able to set a recovery focused goal

## 2020-02-27 NOTE — PROGRESS NOTES
Progress Note - Behavioral Health     Yeimy Mckeon 25 y o  female MRN: @MRN   Unit/Bed#: Nicky Thornton 253-01 Encounter: [id number]    Per nursing report and sign out, patient is pleasant and appropriate  Attending some groups  Naps often  July Boots reports today that she is feeling clearer  AH is 'more of quiet white noise" and not voices  Depression 4 5/10, anxiety 6/10  No SI or HI, feeling more positive  zoloft may be low, will give another day then likely increase  Discussed d/c early next week, she thinks that is best/appropriate  Energy: good  Sleep: normal  Appetite: normal  Medication side effects: No   ROS: all other systems are negative    Mental Status Evaluation:    Appearance:  age appropriate   Behavior:  pleasant, cooperative, with good eye contact   Speech:  Normal volume, regular rate and rhythm   Mood:  depressed and anxious   Affect:  brighter with some improvement, constricted       Thought Process:  Linear and goal directed   Associations: intact associations   Thought Content:  normal and appropriate, negative thinking and cognitive distortions   Perceptual Disturbances: no auditory or visual hallcunations   Risk Potential: Suicidal ideation - None  Homicidal ideation - None  Potential for aggression - No   Sensorium:  A&Ox3   Cognition:  grossly intact   Consciousness:  Alert & Awake   Memory:  recent and remote memory grossly intact   Attention: attention span and concentration are age appropriate           Insight:  improving and fair   Judgment: improving and fair   Muscle Strength  Muscle Tone normal  normal   Gait/Station: normal gait/station with good balance   Motor Activity: no abnormal movements       Vital signs in last 24 hours:    Temp:  [96 6 °F (35 9 °C)-99 1 °F (37 3 °C)] 98 5 °F (36 9 °C)  HR:  [] 94  Resp:  [16-18] 17  BP: (107-122)/(59-86) 107/62    Laboratory results:  I have personally reviewed all pertinent laboratory/tests results      Assessment/Plan   Principal Problem:    Severe episode of recurrent major depressive disorder, with psychotic features ([address] )  Active Problems:    Generalized anxiety disorder    Medical clearance for psychiatric admission    PTSD (post-traumatic stress disorder)    History of Purging behavior      Yeimy Mckeon is improving, will likely increase zoloft tomorrow    Recommended Treatment:     Planned medication and treatment changes: All current active medications have been reviewed    Encourage group therapy, milieu therapy and occupational therapy  [address] checks as unit standard unless ordered or noted otherwise      Current Facility-Administered Medications:  acetaminophen 325 mg Oral Q6H PRN Chadnana Mercado PA-C   acetaminophen 650 mg Oral Q4H PRN Chandana Mercado PA-C   acetaminophen 975 mg Oral Q6H PRN Chandana Mercado PA-C   aluminum-magnesium hydroxide-simethicone 30 mL Oral Q4H PRN Chandana Mercado PA-C   benztropine 1 mg Intramuscular Q6H PRN Chandana Mercado PA-C   benztropine 1 mg Oral Q6H PRN Chandana Mercado PA-C   haloperidol 5 mg Oral Q6H PRN Chandana Mercado, KELY   haloperidol lactate 5 mg Intramuscular Q6H PRN Chandana Mercado PA-C   hydrOXYzine HCL 25 mg Oral Q6H PRN Chandana Mercado, KELY   hydrOXYzine HCL 50 mg Oral Q6H PRN Chandana Mercado, KELY   LORazepam 2 mg Intramuscular Q6H PRN Chandana Mercado PA-C   LORazepam 1 mg Oral Q6H PRN MELISSA SantiagoC   LORazepam 1 mg Oral BID Heber Feathers III, DO   magnesium hydroxide 30 mL Oral Daily PRN Chandana Mercado PA-C   nicotine polacrilex 2 mg Oral Q2H PRN Chandana Mercado, PA-C   OLANZapine 10 mg Intramuscular Q8H PRN Chandana Mercado, KELY   OLANZapine 10 mg Oral Q8H PRN Chandana Mercado, KELY   prazosin 1 mg Oral HS Heber Feathers III, DO   QUEtiapine 200 mg Oral HS Heber Feathers III, DO   risperiDONE 1 mg Oral Q3H PRN MELISSA SantiagoC   sertraline 50 mg Oral Daily Heber Feathers III, DO   traZODone 50 mg Oral HS PRN Sole Donato KELY Loza       1) continue current treatment  2) if no side effects/issues  Increase zoloft tomorrow  3) Continue to support patient and engage them in the programs available as feasible and appropriate  Continue case management support and therapy  Continue discharge planning  Risks / Benefits of Treatment:    Risks, benefits, and possible side effects of medications explained to patient and patient verbalizes understanding and agreement for treatment  Counseling / Coordination of Care:    Patient's progress discussed with staff in treatment team meeting  Medications, treatment progress and treatment plan reviewed with patient        Carlos Eduardo Sosa III, DO  2/27/2020  8:48 AM

## 2020-02-27 NOTE — PLAN OF CARE
Problem: DEPRESSION  Goal: Will be euthymic at discharge  Description  INTERVENTIONS:  - Administer medication as ordered  - Provide emotional support via 1:1 interaction with staff  - Encourage involvement in milieu/groups/activities  - Monitor for social isolation  2/27/2020 1704 by Mitch Cobos RN  Outcome: Progressing  2/27/2020 1704 by Mitch Cobos RN  Outcome: Progressing     Problem: PSYCHOSIS  Goal: Will report no hallucinations or delusions  Description  Interventions:  - Administer medication as  ordered  - Every waking shifts and PRN assess for the presence of hallucinations and or delusions  - Assist with reality testing to support increasing orientation  - Assess if patient's hallucinations or delusions are encouraging self-harm or harm to others and intervene as appropriate  2/27/2020 1704 by Mitch Cobos RN  Outcome: Progressing  2/27/2020 1704 by Mitch Cobos RN  Outcome: Progressing     Problem: ANXIETY  Goal: Will report anxiety at manageable levels  Description  INTERVENTIONS:  - Administer medication as ordered  - Teach and encourage coping skills  - Provide emotional support  - Assess patient/family for anxiety and ability to cope  2/27/2020 1704 by Mitch Cobos RN  Outcome: Progressing  2/27/2020 1704 by Mitch Cobos RN  Outcome: Progressing  Goal: By discharge: Patient will verbalize 2 strategies to deal with anxiety  Description  Interventions:  - Identify any obvious source/trigger to anxiety  - Staff will assist patient in applying identified coping technique/skills  - Encourage attendance of scheduled groups and activities  2/27/2020 1704 by Mitch Cobos RN  Outcome: Progressing  2/27/2020 1704 by Mitch Cobos RN  Outcome: Progressing     Problem: SLEEP DISTURBANCE  Goal: Will exhibit normal sleeping pattern  Description  Interventions:  -  Assess the patients sleep pattern, noting recent changes  - Administer medication as ordered  - Decrease environmental stimuli, including noise, as appropriate during the night  - Encourage the patient to actively participate in unit groups and or exercise during the day to enhance ability to achieve adequate sleep at night  - Assess the patient, in the morning, encouraging a description of sleep experience  Outcome: Progressing

## 2020-02-27 NOTE — PROGRESS NOTES
Pt napped this evening after dinner  Reports feeling better after getting sleep  Pt states she no longer feeling anxious and was able to attend group and snack  Pt states prior to admission she was unable to get much sleep  Pt hopeful for continued progress

## 2020-02-27 NOTE — PROGRESS NOTES
Pt remains pleasant in conversation  Denies SI/HI, AVH  States feeling like her anxiety has improved since yesterday however continues to work on this  Pt has been attending groups throughout the day and coloring/drawing which pt reports has been helpful  Denies any concerns regarding medications at this time  Pt reports sleeping well overnight

## 2020-02-27 NOTE — PROGRESS NOTES
Pt appeared tearful in the afternoon however pt denies being tearful stating she had just splashed face with water  Denies SI/HI, AVH  Remains with positive mood and expresses feeling hopeful  Compliant with medications

## 2020-02-27 NOTE — PROGRESS NOTES
02/27/20 1223   Team Meeting   Meeting Type Tx Team Meeting   Team Members Present   Team Members Present Physician;Nurse;   Physician Team Member Dr Keith Lyons Team Member PENNY [facility] Management Team Member Caryn   Patient/Family Present   Patient Present Yes   Patient's Family Present No     [place] team discussed DX of Severe MDD with psychotic features  MD discussed medications including introduction of Zoloft at night  CM discussed plan to refer pt to [facility] [place] [facility]  Pt agreeable to this  Pt states primary strengths are supportive wider family and motivation to get better

## 2020-02-27 NOTE — PROGRESS NOTES
02/27/20 0752   Team Meeting   Meeting Type Daily Rounds   Team Members Present   Team Members Present Physician;Nurse;;Occupational Therapist   Physician Team Member Dr Tip TORRESC   Nursing Team Member [facility] Management Team Member Caryn/ [address]   OT Team Member Elisa   Patient/Family Present   Patient Present No   Patient's Family Present No     Pt got atarax late yesterday  Napped and showered  Attended wrap up group and slept through night  Pt is likely for dc early next week   CM completed [facility] [place] referral

## 2020-02-28 PROCEDURE — 99232 SBSQ HOSP IP/OBS MODERATE 35: CPT | Performed by: PSYCHIATRY & NEUROLOGY

## 2020-02-28 RX ORDER — CLONAZEPAM 1 MG/1
1 TABLET ORAL DAILY
Status: DISCONTINUED | OUTPATIENT
Start: 2020-02-29 | End: 2020-03-02 | Stop reason: HOSPADM

## 2020-02-28 RX ORDER — CLONAZEPAM 0.5 MG/1
0.5 TABLET ORAL
Status: DISCONTINUED | OUTPATIENT
Start: 2020-02-28 | End: 2020-03-02 | Stop reason: HOSPADM

## 2020-02-28 RX ORDER — CLONAZEPAM 1 MG/1
1 TABLET ORAL DAILY
Status: DISCONTINUED | OUTPATIENT
Start: 2020-02-29 | End: 2020-02-28

## 2020-02-28 RX ORDER — SERTRALINE HYDROCHLORIDE 100 MG/1
100 TABLET, FILM COATED ORAL DAILY
Status: DISCONTINUED | OUTPATIENT
Start: 2020-02-29 | End: 2020-03-02 | Stop reason: HOSPADM

## 2020-02-28 RX ADMIN — NICOTINE POLACRILEX 2 MG: 2 GUM, CHEWING BUCCAL at 12:46

## 2020-02-28 RX ADMIN — SERTRALINE HYDROCHLORIDE 50 MG: 50 TABLET ORAL at 12:45

## 2020-02-28 RX ADMIN — HYDROXYZINE HYDROCHLORIDE 50 MG: 50 TABLET, FILM COATED ORAL at 20:28

## 2020-02-28 RX ADMIN — LORAZEPAM 1 MG: 1 TABLET ORAL at 08:36

## 2020-02-28 RX ADMIN — HYDROXYZINE HYDROCHLORIDE 50 MG: 50 TABLET, FILM COATED ORAL at 14:13

## 2020-02-28 RX ADMIN — SERTRALINE HYDROCHLORIDE 50 MG: 50 TABLET ORAL at 08:35

## 2020-02-28 RX ADMIN — NICOTINE POLACRILEX 2 MG: 2 GUM, CHEWING BUCCAL at 21:33

## 2020-02-28 RX ADMIN — PRAZOSIN HYDROCHLORIDE 1 MG: 1 CAPSULE ORAL at 21:32

## 2020-02-28 RX ADMIN — QUETIAPINE FUMARATE 200 MG: 200 TABLET ORAL at 21:31

## 2020-02-28 RX ADMIN — NICOTINE POLACRILEX 2 MG: 2 GUM, CHEWING BUCCAL at 18:33

## 2020-02-28 RX ADMIN — ALUMINUM HYDROXIDE, MAGNESIUM HYDROXIDE, AND SIMETHICONE 30 ML: 200; 200; 20 SUSPENSION ORAL at 14:13

## 2020-02-28 RX ADMIN — NICOTINE POLACRILEX 2 MG: 2 GUM, CHEWING BUCCAL at 16:01

## 2020-02-28 RX ADMIN — CLONAZEPAM 0.5 MG: 0.5 TABLET ORAL at 17:07

## 2020-02-28 NOTE — PLAN OF CARE
Problem: DISCHARGE PLANNING  Goal: Discharge to home or other facility with appropriate resources  Description  INTERVENTIONS:  - Identify barriers to discharge w/patient and caregiver  - Arrange for needed discharge resources and transportation as appropriate  - Identify discharge learning needs (meds, wound care, etc )  - Arrange for interpretive services to assist at discharge as needed  - Refer to Case Management Department for coordinating discharge planning if the patient needs post-hospital services based on physician/advanced practitioner order or complex needs related to functional status, cognitive ability, or social support system  Outcome: Progressing     Pt is likely dc on Monday  Referral in place for PA [place] and mental health services through [address]  Pt has residence and natural supports I place  Primary barrier was cost of refill on medications  This is resolved  Pt now has insurance

## 2020-02-28 NOTE — PROGRESS NOTES
02/28/20 5669   Team Meeting   Meeting Type Daily Rounds   Team Members Present   Team Members Present Physician;Nurse;;Occupational Therapist   Physician Team Member Dr Yvette Adams PA-C   Nursing Team Member [facility] Management Team Member Caryn/ [address]   OT Team Member Elisa   Patient/Family Present   Patient Present No   Patient's Family Present No     Improved  Ativan 1900 an atarax  Appears positive  Denies hallucinations  Pt will be discharged on Monday

## 2020-02-28 NOTE — PROGRESS NOTES
Progress Note - Behavioral Health   Capo Kilpatrick 25 y o  female MRN: [id number]  Unit/Bed#: [facility] 253-01 Encounter: [id number]    Assessment/Plan   Principal Problem:    Severe episode of recurrent major depressive disorder, with psychotic features ([address] )  Active Problems:    Generalized anxiety disorder    Medical clearance for psychiatric admission    PTSD (post-traumatic stress disorder)    History of Purging behavior      Subjective:  Patient reports decreased depression since being in the hospital   Reports early awakening but she does this at home  States is normal for her  Appetite and energy levels are appropriate  States her hallucinations have diminished and are no longer present  No signs of stephanie or irritability  No episodes of screaming like in last hospitalization  States her main complaint today is increased anxiety at times  States she wakes up feeling anxious and it persists throughout the day  Ativan after taking it is effective but it shortly wears off  Agreed to trial Klonopin  Also agreed to increase Zoloft for anxiety management as well as PTSD and depression management  Did not speak about her alleged alternate personality  Appears to be a coping mechanism to deal with her PTSD  Denied nightmares and flashbacks  Seen going to groups  Cooperative and pleasant  Medication compliant  Appears to be tolerating medications well without serious side effects  Denied current galactorrhea      Current Medications:  Current Facility-Administered Medications   Medication Dose Route Frequency    acetaminophen (TYLENOL) tablet 325 mg  325 mg Oral Q6H PRN    acetaminophen (TYLENOL) tablet 650 mg  650 mg Oral Q4H PRN    acetaminophen (TYLENOL) tablet 975 mg  975 mg Oral Q6H PRN    aluminum-magnesium hydroxide-simethicone (MYLANTA) 200-200-20 mg/5 mL oral suspension 30 mL  30 mL Oral Q4H PRN    benztropine (COGENTIN) injection 1 mg  1 mg Intramuscular Q6H PRN    benztropine (COGENTIN) tablet 1 mg  1 mg Oral Q6H PRN    clonazePAM (KlonoPIN) tablet 0 5 mg  0 5 mg Oral After Dinner    [START ON 2/29/2020] clonazePAM (KlonoPIN) tablet 1 mg  1 mg Oral Daily    haloperidol (HALDOL) tablet 5 mg  5 mg Oral Q6H PRN    haloperidol lactate (HALDOL) injection 5 mg  5 mg Intramuscular Q6H PRN    hydrOXYzine HCL (ATARAX) tablet 25 mg  25 mg Oral Q6H PRN    hydrOXYzine HCL (ATARAX) tablet 50 mg  50 mg Oral Q6H PRN    LORazepam (ATIVAN) injection 2 mg  2 mg Intramuscular Q6H PRN    LORazepam (ATIVAN) tablet 1 mg  1 mg Oral Q6H PRN    magnesium hydroxide (MILK OF MAGNESIA) 400 mg/5 mL oral suspension 30 mL  30 mL Oral Daily PRN    nicotine polacrilex (NICORETTE) gum 2 mg  2 mg Oral Q2H PRN    OLANZapine (ZyPREXA) IM injection 10 mg  10 mg Intramuscular Q8H PRN    OLANZapine (ZyPREXA) tablet 10 mg  10 mg Oral Q8H PRN    prazosin (MINIPRESS) capsule 1 mg  1 mg Oral HS    QUEtiapine (SEROquel) tablet 200 mg  200 mg Oral HS    risperiDONE (RisperDAL M-TABS) dispersible tablet 1 mg  1 mg Oral Q3H PRN    [START ON 2/29/2020] sertraline (ZOLOFT) tablet 100 mg  100 mg Oral Daily    traZODone (DESYREL) tablet 50 mg  50 mg Oral HS PRN       Behavioral Health Medications: all current active meds have been reviewed and continue current psychiatric medications  Vitals:  Vitals:    02/28/20 0736   BP: 127/66   Pulse: 94   Resp: 16   Temp: (!) 97 4 °F (36 3 °C)   SpO2:        Laboratory results:    I have personally reviewed all pertinent laboratory/tests results    Most Recent Labs:   Lab Results   Component Value Date    WBC 9 44 02/25/2020    RBC 4 18 02/25/2020    HGB 14 2 02/25/2020    HCT 42 0 02/25/2020     02/25/2020    RDW 12 7 02/25/2020    NEUTROABS 6 44 02/25/2020    SODIUM 138 02/25/2020    K 4 2 02/25/2020     02/25/2020    CO2 26 02/25/2020    BUN 8 02/25/2020    CREATININE 0 82 02/25/2020    GLUC 74 02/25/2020    CALCIUM 9 4 02/25/2020    AST 17 02/25/2020    ALT 30 02/25/2020    ALKPHOS 76 02/25/2020    TP 7 9 02/25/2020    ALB 4 8 02/25/2020    TBILI 0 31 02/25/2020    CHOLESTEROL 141 01/21/2020    HDL 45 01/21/2020    TRIG 49 01/21/2020    LDLCALC 86 01/21/2020    NONHDLC 96 01/21/2020    [id number] 1 010 02/27/2020    FREET4 0 98 01/24/2018    PREGSERUM Negative 01/18/2020    HCGQUANT <2 02/23/2020    RPR Non-Reactive 02/27/2020    HGBA1C 5 2 01/21/2020     01/21/2020       Psychiatric Review of Systems:  Behavior over the last 24 hours:  improved  Sleep: normal  Appetite: normal  Medication side effects: No  ROS: no complaints, all others negative    Mental Status Evaluation:  Appearance:  casually dressed   Behavior:  cooperative   Speech:  normal pitch and normal volume   Mood:  anxious   Affect:  Appeared brighter   Language Appropriate   Thought Process:  goal directed and logical   Thought Content:  obsessions and Alleged alternate personalities   Perceptual Disturbances: Diminished auditory hallucinations   Risk Potential: Denied SI/HI  Potential for aggression no   Sensorium:  person, place and time/date   Cognition:  grossly intact   Consciousness:  alert and awake    Recent and Remote Memory fair   Attention: attention span and concentration were age appropriate   Insight:  partial   Judgment: Improved   Gait/Station: normal gait/station and normal balance   Motor Activity: no abnormal movements     Progress Toward Goals:  Progressing    Recommended Treatment: Continue with group therapy, milieu therapy and occupational therapy  1   Change Ativan to Klonopin 1mg QD AM +  5mg QD PM for anxiety management  2  Increase Zoloft to 100mg QD for depression and anxiety  3  Continue other medications  4  Disposition planning with tentative discharge on Monday    Risks, benefits and possible side effects of Medications:   Risks, benefits, and possible side effects of medications explained to patient and patient verbalizes understanding        Kristine Post PA-C

## 2020-02-28 NOTE — PROGRESS NOTES
Patient slept throughout the night waking up at [phone] at the nurses station requesting for ensure shake  She was reminded that she gets her ensure for breakfast, lunch, and dinner  Patient requesting medication for sleep and reminded that it was past 0300 and was too late to received medication at 0420 in the morning  She was encouraged to lay down for half an hour and the day room would be opened at 0450 if she wanted to watch tv or color at that time  She politely and pleasantly returned to her room to rest in bed  Will continue to monitor and give support

## 2020-02-28 NOTE — PROGRESS NOTES
Pt remains pleasant in conversation  Denies SI/HI, verbally contracts for safety  States she slept fairly well however did wake early  Mild anxiety  States it tends to fluctuate throughout the day  Pt social with peers on the unit  Denies any concerns at this time

## 2020-02-28 NOTE — PROGRESS NOTES
Med note: Pt requested medication for anxiety at 1613  PRN hydroxyzine-50mg provided for moderate anxiety (Eduardo score - 19)  Pt observed sleeping during follow-up assessment  PRN medication effective for use

## 2020-02-29 PROCEDURE — 99232 SBSQ HOSP IP/OBS MODERATE 35: CPT | Performed by: STUDENT IN AN ORGANIZED HEALTH CARE EDUCATION/TRAINING PROGRAM

## 2020-02-29 RX ORDER — QUETIAPINE FUMARATE 25 MG/1
50 TABLET, FILM COATED ORAL DAILY
Status: DISCONTINUED | OUTPATIENT
Start: 2020-02-29 | End: 2020-03-02 | Stop reason: HOSPADM

## 2020-02-29 RX ADMIN — SERTRALINE HYDROCHLORIDE 100 MG: 100 TABLET ORAL at 08:08

## 2020-02-29 RX ADMIN — CLONAZEPAM 0.5 MG: 0.5 TABLET ORAL at 17:13

## 2020-02-29 RX ADMIN — HYDROXYZINE HYDROCHLORIDE 50 MG: 50 TABLET, FILM COATED ORAL at 10:31

## 2020-02-29 RX ADMIN — QUETIAPINE FUMARATE 50 MG: 25 TABLET ORAL at 14:00

## 2020-02-29 RX ADMIN — NICOTINE POLACRILEX 2 MG: 2 GUM, CHEWING BUCCAL at 15:57

## 2020-02-29 RX ADMIN — HYDROXYZINE HYDROCHLORIDE 50 MG: 50 TABLET, FILM COATED ORAL at 19:08

## 2020-02-29 RX ADMIN — NICOTINE POLACRILEX 2 MG: 2 GUM, CHEWING BUCCAL at 19:08

## 2020-02-29 RX ADMIN — PRAZOSIN HYDROCHLORIDE 1 MG: 1 CAPSULE ORAL at 21:19

## 2020-02-29 RX ADMIN — NICOTINE POLACRILEX 2 MG: 2 GUM, CHEWING BUCCAL at 08:08

## 2020-02-29 RX ADMIN — CLONAZEPAM 1 MG: 1 TABLET ORAL at 07:02

## 2020-02-29 RX ADMIN — NICOTINE POLACRILEX 2 MG: 2 GUM, CHEWING BUCCAL at 10:31

## 2020-02-29 RX ADMIN — QUETIAPINE FUMARATE 200 MG: 200 TABLET ORAL at 21:19

## 2020-02-29 NOTE — PROGRESS NOTES
Pt appears to have slept throughout the night, waking in early hours for water and returning to room to rest

## 2020-02-29 NOTE — PROGRESS NOTES
Pt calm and cooperative most of the evening  Walking halls with peers  Pt triggered by peer's story during group  Tearful in hallway and then screamed loudly  Pt found by RN sitting on the floor  Pt states she does not like thinking about others being hurt the way she was hurt in the past   Pt states her "inner child" is hurting  Pt practiced deep breathing for a few moments and then received Atarax  Pt returned to group

## 2020-02-29 NOTE — PROGRESS NOTES
Progress Note - Behavioral Health   Eileen Adrian 25 y o  female MRN: [id number]  Unit/Bed#: [facility] 253-01 Encounter: [id number]    Subjective:    Per nursing, patient was calm and cooperative yesterday evening, triggered by a peer's story screaming loudly in the hallway, had a nightmare overnight, pacing the halss this morning  Per patient, patient reports that she has been feeling better since the Klonopin was started  She reports that she thought that Seroquel may be helpful for her sleep  She reports that she her "spirits have been up," reports that she is thinking more clearly, able to finish sentences better  She reports that her anxiety has been a bit high (rating anxiety about 7/10 intensity)  Patient reports that she had a trigger in group yesterday, reports that her "alter-ego" came out yesterday  She reports that she was able to recuperate after a minute  She reports that she had a flash-back about her father  She reports that she wakes up around 3-4 AM, goes to bed around 7 PM   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  She reports that she is getting exercise  She denies any passive or active suicidal or homicidal ideation, intent, or plan  She denies any auditory or visual hallucinations  She reports that the staff is treating her well        Behavior over the last 24 hours:  improved  Medication side effects: No  ROS: no complaints    Objective:    Temp:  [97 3 °F (36 3 °C)-97 4 °F (36 3 °C)] 97 4 °F (36 3 °C)  HR:  [90-99] 94  Resp:  [17-18] 18  BP: (123-133)/(72-85) 123/72    Mental Status Evaluation:  Appearance:  sitting comfortably in chair, dressed in casual clothing, adequate hygiene and grooming, cooperative with interview, fairly well related   Behavior:  No tics, tremors, or behaviors observed   Speech:  Normal rate, rhythm, and volume   Mood:  "up and down"   Affect:  Appears generally euthymic, a bit anxious, stable, mood-congruent   Thought Process:  Linear and goal directed   Associations intact associations   Thought Content:  No passive or active suicidal or homicidal ideation, intent, or plan  Perceptual Disturbances: Denies any auditory or visual hallucinations   Sensorium:  Oriented to person, place, time, and situation   Memory:  recent and remote memory grossly intact   Consciousness:  alert and awake   Attention: attention span and concentration were age appropriate   Insight:  fair   Judgment: fair   Gait/Station: normal gait/station   Motor Activity: no abnormal movements       Labs: I have personally reviewed all pertinent laboratory/tests results  Labs in last 72 hours:   Recent Labs     02/27/20  0607   [id number] 1 010   RPR Non-Reactive       Progress Toward Goals: Progressing    Recommended Treatment: Continue with group therapy, milieu therapy and occupational therapy  Risks, benefits and possible side effects of Medications:   Risks, benefits, and possible side effects of medications explained to patient and patient verbalizes understanding  Medications: all current active meds have been reviewed      Current Facility-Administered Medications:  acetaminophen 325 mg Oral Q6H PRN Carlos Garry, PA-C   acetaminophen 650 mg Oral Q4H PRN Carlos Garry, PA-C   acetaminophen 975 mg Oral Q6H PRN Carlos Garry, PA-C   aluminum-magnesium hydroxide-simethicone 30 mL Oral Q4H PRN Carlos Garry, PA-C   benztropine 1 mg Intramuscular Q6H PRN Carlos Garry, PA-C   benztropine 1 mg Oral Q6H PRN Carlos Garry, PA-C   clonazePAM 0 5 mg Oral After Pan Soup, PA-C   clonazePAM 1 mg Oral Daily Carlos Garry, PA-C   haloperidol 5 mg Oral Q6H PRN Carlos Garry, PA-C   haloperidol lactate 5 mg Intramuscular Q6H PRN Carlos Garry, PA-C   hydrOXYzine HCL 25 mg Oral Q6H PRN Carlos Garry, PA-C   hydrOXYzine HCL 50 mg Oral Q6H PRN Carlos Garry, PA-C   LORazepam 2 mg Intramuscular Q6H PRN Carlos Garry, PA-C LORazepam 1 mg Oral Q6H PRN Clarance Sauger, PA-C   magnesium hydroxide 30 mL Oral Daily PRN Clarance Sauger, PA-C   nicotine polacrilex 2 mg Oral Q2H PRN Clarance Sauger, PA-C   OLANZapine 10 mg Intramuscular Q8H PRN Clarance Sauger, PA-C   OLANZapine 10 mg Oral Q8H PRN Clarance Sauger, PA-C   prazosin 1 mg Oral HS Johnella Sallies III, DO   QUEtiapine 200 mg Oral HS Johnella Sallies III, DO   risperiDONE 1 mg Oral Q3H PRN Clarance Sauger, PA-C   sertraline 100 mg Oral Daily Clarance Sauger, PA-C   traZODone 50 mg Oral HS PRN Clarance Sauger, PA-C       Assessment/Plan   Principal Problem:    Severe episode of recurrent major depressive disorder, with psychotic features [facility])  Active Problems:    Generalized anxiety disorder    Medical clearance for psychiatric admission    PTSD (post-traumatic stress disorder)    History of Purging behavior    26 y/o Female with MDD with psychotic features, ANA, PTSD- continues to have improvements in mood symptoms, still has significant anxiety, PTSD symptoms triggered yesterday evening during group  Plan:  -Will titrate Seroquel to 50 mg qAM, 200 mg qhs  -Continue rest of med regimen

## 2020-02-29 NOTE — PROGRESS NOTES
Pt awake at 0615 and approached the desk asking to shower  Pt reports having a nightmare and woke up "soaked" in sweat  Pt showered and asked for PRN medications  Discussed available medications and pt decided to wait until 0700 to received scheduled klonopin instead of PRN meds  Will continue to monitor and support

## 2020-02-29 NOTE — PROGRESS NOTES
Pt denies SI/HI, verbally contracts for safety  Continues to report elevated anxiety and states she has a "terrible nightmare" early in the morning that she is having a hard time not thinking about  Pt received prn medications  Has been walking halls frequently with peers and does seem anxious in conversation  Pt however pleasant and denies any concerns/questions regarding treatment at this time

## 2020-02-29 NOTE — PROGRESS NOTES
Pt   Given  Atarax  50  Mg  Po  For  Anxiety  At 2028  S-18  Med  Effective  Pt   States  Feels  Connie  Anxious

## 2020-03-01 PROCEDURE — 99232 SBSQ HOSP IP/OBS MODERATE 35: CPT | Performed by: STUDENT IN AN ORGANIZED HEALTH CARE EDUCATION/TRAINING PROGRAM

## 2020-03-01 RX ADMIN — NICOTINE POLACRILEX 2 MG: 2 GUM, CHEWING BUCCAL at 21:29

## 2020-03-01 RX ADMIN — PRAZOSIN HYDROCHLORIDE 1 MG: 1 CAPSULE ORAL at 21:27

## 2020-03-01 RX ADMIN — NICOTINE POLACRILEX 2 MG: 2 GUM, CHEWING BUCCAL at 12:14

## 2020-03-01 RX ADMIN — MAGNESIUM HYDROXIDE 30 ML: 400 SUSPENSION ORAL at 12:35

## 2020-03-01 RX ADMIN — QUETIAPINE FUMARATE 50 MG: 25 TABLET ORAL at 08:10

## 2020-03-01 RX ADMIN — HYDROXYZINE HYDROCHLORIDE 50 MG: 50 TABLET, FILM COATED ORAL at 18:40

## 2020-03-01 RX ADMIN — NICOTINE POLACRILEX 2 MG: 2 GUM, CHEWING BUCCAL at 17:03

## 2020-03-01 RX ADMIN — NICOTINE POLACRILEX 2 MG: 2 GUM, CHEWING BUCCAL at 09:45

## 2020-03-01 RX ADMIN — QUETIAPINE FUMARATE 200 MG: 200 TABLET ORAL at 21:27

## 2020-03-01 RX ADMIN — CLONAZEPAM 1 MG: 1 TABLET ORAL at 08:10

## 2020-03-01 RX ADMIN — CLONAZEPAM 0.5 MG: 0.5 TABLET ORAL at 17:03

## 2020-03-01 RX ADMIN — SERTRALINE HYDROCHLORIDE 100 MG: 100 TABLET ORAL at 08:10

## 2020-03-01 NOTE — PROGRESS NOTES
Progress Note - Behavioral Health   Cameron Coughlin 25 y o  female MRN: [id number]  Unit/Bed#: [facility] 253-01 Encounter: [id number]    Subjective:    Per nursing, patient is calm and cooperative, pleasant and social with peers, slept well overnight  Per patient, patient reports that things are going well  She reports that she woke up early in the morning but was able to fall asleep for a few hours  She reports that her mood has been "good "  She reports that her anxiety has been lower, about 4/10 intensity  She reports eager to return home  She reports that taking Seroquel in the morning was helpful  She reports her appetite has been okay  She reports that she has been constipated, has been using milk of Magnesia  Patient reports her energy has been okay  She denies any passive or active suicidal ideation, intent, or plan  She denies any auditory or visual hallucinations  Behavior over the last 24 hours:  improved  Medication side effects: No  ROS: no complaints    Objective:    Temp:  [97 5 °F (36 4 °C)-97 8 °F (36 6 °C)] 97 5 °F (36 4 °C)  HR:  [] 121  Resp:  [16] 16  BP: (122-124)/(80) 123/80    Mental Status Evaluation:  Appearance:  sitting comfortably in chair, dressed in casual clothing, adequate hygiene and grooming, cooperative with interview, fairly well related   Behavior:  No tics, tremors, or behaviors observed   Speech:  Normal rate, rhythm, and volume   Mood:  "good"   Affect:  Appears generally euthymic, stable, mood-congruent   Thought Process:  Linear and goal directed   Associations intact associations   Thought Content:  No passive or active suicidal or homicidal ideation, intent, or plan     Perceptual Disturbances: Denies any auditory or visual hallucinations   Sensorium:  Oriented to person, place, time, and situation   Memory:  recent and remote memory grossly intact   Consciousness:  alert and awake   Attention: attention span and concentration were age appropriate   Insight: fair   Judgment: fair   Gait/Station: normal gait/station   Motor Activity: no abnormal movements     Progress Toward Goals: Progressing    Recommended Treatment: Continue with group therapy, milieu therapy and occupational therapy  Risks, benefits and possible side effects of Medications:   Risks, benefits, and possible side effects of medications explained to patient and patient verbalizes understanding  Medications: all current active meds have been reviewed      Current Facility-Administered Medications:  acetaminophen 325 mg Oral Q6H PRN Neil Smart, PA-C   acetaminophen 650 mg Oral Q4H PRN [place] Smart, PA-C   acetaminophen 975 mg Oral Q6H PRN Neil Smart, PA-C   aluminum-magnesium hydroxide-simethicone 30 mL Oral Q4H PRN Neil Smart, PA-C   benztropine 1 mg Intramuscular Q6H PRN Neil Smart, PA-C   benztropine 1 mg Oral Q6H PRN [place] Smart, PA-C   clonazePAM 0 5 mg Oral After Pan Soup, PA-C   clonazePAM 1 mg Oral Daily Neil Smart, PA-C   haloperidol 5 mg Oral Q6H PRN [place] Smart, PA-C   haloperidol lactate 5 mg Intramuscular Q6H PRN Neil Smart, PA-C   hydrOXYzine HCL 25 mg Oral Q6H PRN [place] Smart, PA-C   hydrOXYzine HCL 50 mg Oral Q6H PRN [place] Smart, PA-C   LORazepam 2 mg Intramuscular Q6H PRN [place] Smart, PA-C   LORazepam 1 mg Oral Q6H PRN Neil Smart, PA-C   magnesium hydroxide 30 mL Oral Daily PRN [place] Smart, PA-C   nicotine polacrilex 2 mg Oral Q2H PRN [place] Smart, PA-C   OLANZapine 10 mg Intramuscular Q8H PRN [place] Smart, PA-C   OLANZapine 10 mg Oral Q8H PRN [place] Smart, PA-C   prazosin 1 mg Oral HS Darius Penning III, DO   QUEtiapine 200 mg Oral HS [facility] Penning III, DO   QUEtiapine 50 mg Oral Daily Bard Ricardo MD   risperiDONE 1 mg Oral Q3H PRN Neil Smart, PA-C   sertraline 100 mg Oral Daily Neil Smart, PA-C   traZODone 50 mg Oral HS PRN Neil Smart, KELY           Assessment/Plan   Principal Problem:    Severe episode of recurrent major depressive disorder, with psychotic features ([facility] [address] )  Active Problems:    Generalized anxiety disorder    Medical clearance for psychiatric admission    PTSD (post-traumatic stress disorder)    History of Purging behavior    26 y/o Female with MDD, ANA, PTSD- continues to have improved mood symptoms, less anxiety, sleeping well  Plan:  -Continue current med regimen

## 2020-03-01 NOTE — PROGRESS NOTES
Pt pleasant, calm and cooperative  Denies SI/HI  Mild depression and anxiety which fluctuates  Pt walking halls at times with peers, remains social  Denies any concerns

## 2020-03-01 NOTE — PROGRESS NOTES
Pt remains pleasant and social with peers  Walking halls often this morning which pt states she enjoys and makes goals with her peers on how many laps she will walk for the day  Denies SI/HI, reports having fluctuating anxiety throughout the day however currently not overwhelming  Pt states she slept well overnight "Better than I have been "  Denies any questions or concerns at this time

## 2020-03-02 VITALS
HEART RATE: 83 BPM | SYSTOLIC BLOOD PRESSURE: 105 MMHG | OXYGEN SATURATION: 99 % | RESPIRATION RATE: 17 BRPM | TEMPERATURE: 98 F | HEIGHT: 64 IN | BODY MASS INDEX: 20.62 KG/M2 | WEIGHT: 120.8 LBS | DIASTOLIC BLOOD PRESSURE: 62 MMHG

## 2020-03-02 PROCEDURE — 99239 HOSP IP/OBS DSCHRG MGMT >30: CPT | Performed by: PHYSICIAN ASSISTANT

## 2020-03-02 RX ORDER — SERTRALINE HYDROCHLORIDE 100 MG/1
100 TABLET, FILM COATED ORAL DAILY
Qty: 30 TABLET | Refills: 1 | Status: SHIPPED | OUTPATIENT
Start: 2020-03-03

## 2020-03-02 RX ORDER — QUETIAPINE FUMARATE 50 MG/1
50 TABLET, FILM COATED ORAL DAILY
Qty: 30 TABLET | Refills: 1 | Status: SHIPPED | OUTPATIENT
Start: 2020-03-03

## 2020-03-02 RX ORDER — QUETIAPINE FUMARATE 200 MG/1
200 TABLET, FILM COATED ORAL
Qty: 30 TABLET | Refills: 1 | Status: SHIPPED | OUTPATIENT
Start: 2020-03-02

## 2020-03-02 RX ORDER — CLONAZEPAM 0.5 MG/1
0.5 TABLET ORAL
Qty: 30 TABLET | Refills: 1 | Status: SHIPPED | OUTPATIENT
Start: 2020-03-02

## 2020-03-02 RX ORDER — PRAZOSIN HYDROCHLORIDE 1 MG/1
1 CAPSULE ORAL
Qty: 30 CAPSULE | Refills: 1 | Status: SHIPPED | OUTPATIENT
Start: 2020-03-02

## 2020-03-02 RX ORDER — CLONAZEPAM 1 MG/1
1 TABLET ORAL DAILY
Qty: 30 TABLET | Refills: 1 | Status: SHIPPED | OUTPATIENT
Start: 2020-03-03

## 2020-03-02 RX ADMIN — QUETIAPINE FUMARATE 50 MG: 25 TABLET ORAL at 08:08

## 2020-03-02 RX ADMIN — SERTRALINE HYDROCHLORIDE 100 MG: 100 TABLET ORAL at 08:07

## 2020-03-02 RX ADMIN — CLONAZEPAM 1 MG: 1 TABLET ORAL at 08:08

## 2020-03-02 NOTE — PROGRESS NOTES
Pt informed staff that she had a nightmare and was diaphoretic  Pt verbalized to staff that she may be feeling a little anxious about being discharged today  Pt was given new [facility] attire and some water and stated that she would try to go back to sleep  No further concerns at the present time

## 2020-03-02 NOTE — PROGRESS NOTES
Pt states that her Jeff Walden is picking her up for discharge at 1000 today  Verbalizes readiness for discharge, expresses some anxiety but manageable  Denies any SI  Affect bright, taking about going out to lunch today when she leaves  Reports she slept well  No questions or concerns for nursing at this time

## 2020-03-02 NOTE — DISCHARGE INSTRUCTIONS
Anxiety   WHAT YOU SHOULD KNOW:   Anxiety is a condition that causes you to feel excessive worry, uneasiness, or fear  Family or work stress, smoking, caffeine, and alcohol can increase your risk for anxiety  Certain medicines or health conditions can also increase your risk  Anxiety may begin gradually, and can become a long-term condition if it is not managed or treated  AFTER YOU LEAVE:   Medicines:   · Medicines  can help you feel more calm and relaxed, and decrease your symptoms  · Take your medicine as directed  Contact your healthcare provider if you think your medicine is not helping or if you have side effects  Tell him if you are allergic to any medicine  Keep a list of the medicines, vitamins, and herbs you take  Include the amounts, and when and why you take them  Bring the list or the pill bottles to follow-up visits  Carry your medicine list with you in case of an emergency  Follow up with your healthcare provider within 2 weeks or as directed:  Write down your questions so you remember to ask them during your visits  Manage anxiety:   · Go to counseling as directed  Cognitive behavioral therapy can help you understand and change how you react to events that trigger your symptoms  · Find ways to manage your symptoms  Activities such as exercise, meditation, or listening to music can help you relax  · Practice deep breathing  Breathing can change how your body reacts to stress  Focus on taking slow, deep breaths several times a day, or during an anxiety attack  Breathe in through your nose, and out through your mouth  · Avoid caffeine  Caffeine can make your symptoms worse  Avoid foods or drinks that are meant to increase your energy level  · Limit or avoid alcohol  Ask your healthcare provider if alcohol is safe for you  You may not be able to drink alcohol if you take certain anxiety or depression medicines  Limit alcohol to 1 drink per day if you are a woman   Limit alcohol to 2 drinks per day if you are a man  A drink of alcohol is 12 ounces of beer, 5 ounces of wine, or 1½ ounces of liquor  Contact your healthcare provider if:   · Your symptoms get worse or do not get better with treatment  · You think your medicine may be causing side effects  · Your anxiety keeps you from doing your regular daily activities  · You have new symptoms since your last visit  · You have questions or concerns about your condition or care  Seek care immediately or call 911 if:   · You have chest pain, tightness, or heaviness that may spread to your shoulders, arms, jaw, neck, or back  · You feel like hurting yourself or someone else  · You feel dizzy, lightheaded, or faint  © 2014 [address] is for End User's use only and may not be sold, redistributed or otherwise used for commercial purposes  All illustrations and images included in CareNotes® are the copyrighted property of A D A M , Inc  or Jony Dorsey  The above information is an  only  It is not intended as medical advice for individual conditions or treatments  Talk to your doctor, nurse or pharmacist before following any medical regimen to see if it is safe and effective for you  Depression   WHAT YOU NEED TO KNOW:   Depression is a medical condition that causes feelings of sadness or hopelessness that do not go away  Depression may cause you to lose interest in things you used to enjoy  These feelings may interfere with your daily life  DISCHARGE INSTRUCTIONS:   Call 911 for any of the following:   · You think about harming yourself or someone else  Contact your healthcare provider if:   · Your symptoms do not improve  · You cannot make it to your next appointment  · You have new symptoms  · You have questions or concerns about your condition or care  Medicines:   · Antidepressants  may be given to improve or balance your mood   You may need to take this medicine for several weeks before you begin to feel better  · Take your medicine as directed  Contact your healthcare provider if you think your medicine is not helping or if you have side effects  Tell him of her if you are allergic to any medicine  Keep a list of the medicines, vitamins, and herbs you take  Include the amounts, and when and why you take them  Bring the list or the pill bottles to follow-up visits  Carry your medicine list with you in case of an emergency  Therapy  may be used to treat your depression  A therapist will help you learn to cope with your thoughts and feelings  This can be done alone or in a group  It may also be done with family members or a significant other  Self-care:   · Get regular physical activity  Try to exercise for 30 minutes, 3 to 5 days a week  Work with your healthcare provider to develop an exercise plan that you enjoy  Physical activity may improve your symptoms  · Get enough sleep  Create a routine to help you relax before bed  You can listen to music, read, or do yoga  Try to go to bed and wake up at the same time every day  Sleep is important for emotional health  · Eat a variety of healthy foods from all of the food groups  A healthy meal plan is low in fat, salt, and added sugar  Ask your healthcare provider for more information about a meal plan that is right for you  · Do not drink alcohol or use drugs  Alcohol and drugs can make your symptoms worse  Follow up with your healthcare provider as directed: Your healthcare provider will monitor your progress at follow-up visits  He or she will also monitor your medicine if you take antidepressants  Your healthcare provider will ask if the medicine is helping  Tell him or her about any side effects or problems you may have with your medicine  The type or amount of medicine may need to be changed  Write down your questions so you remember to ask them during your visits    © 2017 [facility] [address] is for End User's use only and may not be sold, redistributed or otherwise used for commercial purposes  All illustrations and images included in CareNotes® are the copyrighted property of A D A [employer]  or Jony Dorsey  The above information is an  only  It is not intended as medical advice for individual conditions or treatments  Talk to your doctor, nurse or pharmacist before following any medical regimen to see if it is safe and effective for you  Post Traumatic Stress Disorder   WHAT YOU NEED TO KNOW:   Post traumatic stress disorder (PTSD) is a condition that may occur after you have experienced a traumatic situation or event  This event may have caused you to feel intense fear, pain, or sorrow  You may think you are going to get hurt or die  You may also continue to feel helpless after the event  These feelings affect your daily activities and relationships  DISCHARGE INSTRUCTIONS:   Medicine:   · Antianxiety medicine: This medicine may be given to decrease anxiety and help you feel calm and relaxed  · Antidepressants: These medicines decrease or stop the symptoms of depression  · Sedative: This medicine is given to help you stay calm and relaxed  · Take your medicine as directed  Contact your healthcare provider if you think your medicine is not helping or if you have side effects  Tell him or her if you are allergic to any medicine  Keep a list of the medicines, vitamins, and herbs you take  Include the amounts, and when and why you take them  Bring the list or the pill bottles to follow-up visits  Carry your medicine list with you in case of an emergency  Follow up with your healthcare provider as directed:  Write down your questions so you remember to ask them during your visits  Self-care:   · Attend therapy sessions as directed: It is normal to have doubts or feel discomfort with your therapy   Tell your healthcare providers if you are uncomfortable or have questions about your therapies  · Get emotional support:  Spend time with family and friends and share your feelings with someone you trust  This extra support may help you feel better  · Get regular exercise:  Exercise may help to decrease stress  Ask your healthcare provider about the best exercise plan for you  For support and more information:   · [facility] [address] Traumatic Stress Disorder  Phone: [phone] - [phone]  Web Address: [email or web address] [email or web address]  va gov/  · [address] [place] [address], Public Information & Communication Branch  [address], [address], [address]  [place] [address]   Phone: [phone]  Phone: [phone]  Web Address: Yariel hess  Contact your healthcare provider if:   · You cannot make it to your next appointment  · You cannot sleep or are sleeping too much  · You have questions or concerns about your condition or care  Seek care immediately or call 911 if:   · You think about hurting or killing yourself or someone else  © 2017 [facility] Information is for End User's use only and may not be sold, redistributed or otherwise used for commercial purposes  All illustrations and images included in CareNotes® are the copyrighted property of A D A [employer] , [employer]  or Jony Dorsey  The above information is an  only  It is not intended as medical advice for individual conditions or treatments  Talk to your doctor, nurse or pharmacist before following any medical regimen to see if it is safe and effective for you  Psychotic Disorder   WHAT YOU NEED TO KNOW:   A psychotic disorder is a medical condition that causes hallucinations and delusions  Hallucinations are seeing, hearing, tasting, or feeling things that are not real  Delusions are beliefs that something is real, true, or right when it is not   These false beliefs do not go away even if there is proof that they are not true  You may believe someone is spying on you, after you, or controlling your mind  You may also believe there is something wrong with how your body works  Schizophrenia and schizoaffective disorder are examples of psychotic disorders  DISCHARGE INSTRUCTIONS:   Call 911 if:   · You feel like you could harm yourself or someone else  Contact your healthcare provider if:   · Your symptoms do not improve  · You cannot make it to your next appointment  · You have new symptoms  · You have questions or concerns about your condition or care  Medicines  may be given to decrease your symptoms  You may need 1 or more medicines  You may need to take your medicine for several weeks before you begin to feel better  Tell your healthcare provider about any side effects or problems you have with your medicines  The type or amount of medicine may need to be changed  Get support: It may be difficult to cope with your illness  You may feel lonely, anxious, or depressed  It may help to join a support group  A support group lets you talk with others who have a mental illness  For information and more support visit:  · [facility]  [address]  [facility]  , [address] [place] [address] , [address] [place] [address]  Phone: [phone]  Phone: [phone]  Web Address: http://[employer]/  [employer]  Self-care:   · Do not drink alcohol or use illegal drugs  Alcohol and illegal drugs can make your symptoms worse  Ask your healthcare provider for information if you currently drink alcohol or use illegal drugs and need help to quit  · Do not smoke  Nicotine and other chemicals in cigarettes and cigars can cause lung damage  They can also decrease how well your medicine works  Ask your healthcare provider for information if you currently smoke and need help to quit  E-cigarettes or smokeless tobacco still contain nicotine  Talk to your healthcare provider before you use these products  · Exercise regularly  Exercise can help improve your mood and decrease symptoms  Ask about the best exercise plan for you  · Manage your stress  Stress can make your condition worse  Ask your healthcare provider for more information about practicing mindfulness and deep breathing exercises to help decrease your stress  You may learn other ways to manage stress during therapy  Follow up with your healthcare provider as directed:  Write down your questions so you remember to ask them during your visits  © 2017 [address] [facility] Information is for End User's use only and may not be sold, redistributed or otherwise used for commercial purposes  All illustrations and images included in CareNotes® are the copyrighted property of A D A M , Inc  or Jony Dorsey  The above information is an  only  It is not intended as medical advice for individual conditions or treatments  Talk to your doctor, nurse or pharmacist before following any medical regimen to see if it is safe and effective for you

## 2020-03-02 NOTE — PROGRESS NOTES
Was given Atarax 50 mg po prn anxiety (Eduardo=14) at 1840  Good effect reported within the hr  As states her anxiety is now a 2/10  Will continue to monitor
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
## 2020-03-02 NOTE — PROGRESS NOTES
03/02/20 0748   Team Meeting   Meeting Type Daily Rounds   Team Members Present   Team Members Present Physician;Nurse;;Occupational Therapist   Physician Team Member Victor Hugo Abraham PA-C   Nursing Team Member PENNY [facility] Management Team Member Caryn/ [address]   OT Team Member Elisa   Patient/Family Present   Patient Present No   Patient's Family Present No     Pleasant and social  Nightmare last night  Pt received Atarax yesterday for mild anxiety  Mother will  today around 10 am  Pt has [facility] [place] referral in place and will go to [facility] for follow up on Wednesday

## 2020-03-02 NOTE — BH TRANSITION RECORD
Contact Information: If you have any questions, concerns, pended studies, tests and/or procedures, or emergencies regarding your inpatient behavioral health visit  Please contact [place] behavioral health unit [phone] and ask to speak to a , nurse or physician  A contact is available 24 hours/ 7 days a week at this number  Summary of Procedures Performed During your Stay:  Below is a list of major procedures performed during your hospital stay and a summary of results:  - No major procedures performed  Pending Studies (From admission, onward)    None        If studies are pending at discharge, follow up with your PCP and/or referring provider

## 2020-03-02 NOTE — DISCHARGE INSTR - OTHER ORDERS
[facility], [address] Crisis Telephone Number: [phone] [address] [address] [place] [phone]  Financial Help Provided: SNAP, [employer], Food [place], Welfare Office, [facility]  Please visit this office to complete your application for Medical Assistance    The [employer] Program is a free 12-week (2 1/2 hours/week) course for families of individuals with severe brain disorders (mental illnesses)  The classes are taught by trained family members  All course materials are furnished at no cost to you  Below are some details  To register, e-mail [email or web address] or call [phone]  The curriculum focuses on schizophrenia, bipolar disorder (manic depression), clinical depression, panic disorders and obsessive-compulsive disorder (OCD)  The course discusses the clinical treatment of these illnesses and teaches the knowledge and skills that family members need to cope more effectively  Topics Include:  Learning about feelings, learning about facts   Schizophrenia, major depression and stephanie: diagnosis and dealing with critical periods   Subtypes of depression and bipolar disorder, panic disorder and OCD; diagnosis and causes; sharing our stories   The biology of the brain/new research   Problem solving workshops   Medication review   Empathy workshop  what its like to have a brain disorder   Communication skills workshop   Self-care and relative groups   Rehabilitation, services available   Advocacy: fighting stigma   Review and certification ceremony    [id number]-pl-[id number] Education Course  The [employer] Education class is a ten week  two hours per week  experiential education course on the topic of recovery for any person with serious mental illness who is interested in establishing and maintaining wellness   The course uses a combination of lecture, interactive exercises, and structural group processes  The diversity of experience among participants affords for a lively dynamic that moves the course along  [employer]s [employer] Education class is offered free of charge to people who experience mental illness  You do not need to be a member of [employer] to take the course  Courses are taught by teams of trained mentors/peer-teachers who are themselves experienced at living well with mental illness  Below are some details  To register, call [phone] or e-mail [email or web address]  Sign up today! [address] [facility] group is for family members, caregivers, and loved ones of individuals living with the everyday challenges of mental illness  The leaders are family members in the same situation  Sessions take place in an intimate, confidential setting to allow families to share openly with each other  These support groups allow participants to learn from the experiences of other group members, share coping strategies, and offer each other encouragement and understanding  Matthew Mckeon know that you are not alone  Drop inno registration is necessary  Here are the times and locations  [facility]  Monthly: 3rd Monday, 7:00-8:30 pm  [address], [place]  Monthly: 4th Tuesday, 7:00-8:30 pm  [address] [facility]  Monthly: 1st Monday, 7:00-8:30 pm  [address]         Monthly Support for Persons with Mental Illness  The Peer Support Group is a monthly meeting for individuals facing the challenges of recovering from severe and persistent mental illness  Depression, manic depression, schizophrenia, and general anxiety disorder are only a few of the diagnoses of individuals who have found a supportive place at our meetings    Our [place]  We are a fellowship of individuals who share a common goal of recovery and the ability to maintain mental and emotional stability  We help others and ourselves through sharing our experiences, strength and hope with each other  No matter how traumatic our past or how despairing our present may be, there is hope for a new day  Sessions take place in an intimate, confidential setting to allow individuals to share openly with each other  Jorge De La Torre know that you are not alone  Drop inno registration is necessary  Here are the times and locations    [employer]  Monthly: 1st Monday, 7:00-8:30 pm  [facility]  [address] [place], [place]   [id number]  Monthly: 3rd Monday, 7:00-8:30 pm  [address]  [address] [facility]

## 2020-04-20 ENCOUNTER — APPOINTMENT (EMERGENCY)
Dept: RADIOLOGY | Facility: HOSPITAL | Age: 25
End: 2020-04-20
Payer: MEDICARE

## 2020-04-20 ENCOUNTER — HOSPITAL ENCOUNTER (EMERGENCY)
Facility: HOSPITAL | Age: 25
Discharge: HOME | End: 2020-04-20
Attending: EMERGENCY MEDICINE | Admitting: EMERGENCY MEDICINE
Payer: MEDICARE

## 2020-04-20 VITALS
OXYGEN SATURATION: 97 % | SYSTOLIC BLOOD PRESSURE: 102 MMHG | HEART RATE: 98 BPM | BODY MASS INDEX: 21.87 KG/M2 | TEMPERATURE: 98.4 F | RESPIRATION RATE: 15 BRPM | DIASTOLIC BLOOD PRESSURE: 58 MMHG | WEIGHT: 127.43 LBS
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
## 2020-04-20 DIAGNOSIS — R46.89 AGGRESSIVE BEHAVIOR: ICD-10-CM

## 2020-04-20 DIAGNOSIS — S00.81XA ABRASION OF CHIN, INITIAL ENCOUNTER: ICD-10-CM

## 2020-04-20 DIAGNOSIS — F10.10 ALCOHOL ABUSE: ICD-10-CM

## 2020-04-20 PROCEDURE — 70450 CT HEAD/BRAIN W/O DYE: CPT

## 2020-04-20 PROCEDURE — 96372 THER/PROPH/DIAG INJ SC/IM: CPT

## 2020-04-20 PROCEDURE — 99284 EMERGENCY DEPT VISIT MOD MDM: CPT | Performed by: EMERGENCY MEDICINE

## 2020-04-20 PROCEDURE — 99285 EMERGENCY DEPT VISIT HI MDM: CPT

## 2020-04-20 RX ORDER — LORAZEPAM 2 MG/ML
1 INJECTION INTRAMUSCULAR ONCE
Status: COMPLETED | OUTPATIENT
Start: 2020-04-20 | End: 2020-04-20

## 2020-04-20 RX ORDER — HALOPERIDOL 5 MG/ML
5 INJECTION INTRAMUSCULAR ONCE
Status: COMPLETED | OUTPATIENT
Start: 2020-04-20 | End: 2020-04-20

## 2020-04-20 RX ADMIN — HALOPERIDOL LACTATE 5 MG: 5 INJECTION INTRAMUSCULAR at 18:02

## 2023-02-14 NOTE — PLAN OF CARE
Problem: DEPRESSION  Goal: Will be euthymic at discharge  Description  INTERVENTIONS:  - Administer medication as ordered  - Provide emotional support via 1:1 interaction with staff  - Encourage involvement in milieu/groups/activities  - Monitor for social isolation  Outcome: Progressing     Problem: PSYCHOSIS  Goal: Will report no hallucinations or delusions  Description  Interventions:  - Administer medication as  ordered  - Every waking shifts and PRN assess for the presence of hallucinations and or delusions  - Assist with reality testing to support increasing orientation  - Assess if patient's hallucinations or delusions are encouraging self-harm or harm to others and intervene as appropriate  Outcome: Progressing     Problem: ANXIETY  Goal: Will report anxiety at manageable levels  Description  INTERVENTIONS:  - Administer medication as ordered  - Teach and encourage coping skills  - Provide emotional support  - Assess patient/family for anxiety and ability to cope  Outcome: Progressing  Goal: By discharge: Patient will verbalize 2 strategies to deal with anxiety  Description  Interventions:  - Identify any obvious source/trigger to anxiety  - Staff will assist patient in applying identified coping technique/skills  - Encourage attendance of scheduled groups and activities  Outcome: Progressing     Problem: SLEEP DISTURBANCE  Goal: Will exhibit normal sleeping pattern  Description  Interventions:  -  Assess the patients sleep pattern, noting recent changes  - Administer medication as ordered  - Decrease environmental stimuli, including noise, as appropriate during the night  - Encourage the patient to actively participate in unit groups and or exercise during the day to enhance ability to achieve adequate sleep at night  - Assess the patient, in the morning, encouraging a description of sleep experience  Outcome: Progressing     Problem: Nutrition/Hydration-ADULT  Goal: Nutrient/Hydration intake appropriate for improving, restoring or maintaining nutritional needs  Description  Monitor and assess patient's nutrition/hydration status for malnutrition  Collaborate with interdisciplinary team and initiate plan and interventions as ordered  Monitor patient's weight and dietary intake as ordered or per policy  Utilize nutrition screening tool and intervene as necessary  Determine patient's food preferences and provide high-protein, high-caloric foods as appropriate       INTERVENTIONS:  - Monitor oral intake, urinary output, labs, and treatment plans  - Assess nutrition and hydration status and recommend course of action  - Evaluate amount of meals eaten  - Assist patient with eating if necessary   - Allow adequate time for meals  - Recommend/ encourage appropriate diets, oral nutritional supplements, and vitamin/mineral supplements  - Order, calculate, and assess calorie counts as needed  - Recommend, monitor, and adjust tube feedings and TPN/PPN based on assessed needs  - Assess need for intravenous fluids  - Provide specific nutrition/hydration education as appropriate  - Include patient/family/caregiver in decisions related to nutrition  Outcome: Progressing Cibinqo Pregnancy And Lactation Text: It is unknown if this medication will adversely affect pregnancy or breast feeding.  You should not take this medication if you are currently pregnant or planning a pregnancy or while breastfeeding.
